# Patient Record
Sex: FEMALE | Race: WHITE | NOT HISPANIC OR LATINO | Employment: OTHER | URBAN - METROPOLITAN AREA
[De-identification: names, ages, dates, MRNs, and addresses within clinical notes are randomized per-mention and may not be internally consistent; named-entity substitution may affect disease eponyms.]

---

## 2023-03-24 DIAGNOSIS — M54.16 ACUTE LEFT LUMBAR RADICULOPATHY: Primary | ICD-10-CM

## 2023-03-24 PROBLEM — M85.80 OSTEOPENIA: Status: ACTIVE | Noted: 2023-03-24

## 2023-03-24 PROBLEM — R29.6 RECURRENT FALLS WHILE WALKING: Status: ACTIVE | Noted: 2023-03-24

## 2023-03-24 PROBLEM — M46.46 DISCITIS OF LUMBAR REGION: Status: ACTIVE | Noted: 2023-03-24

## 2023-03-24 PROBLEM — E66.812 OBESITY, CLASS II, BMI 35-39.9: Status: ACTIVE | Noted: 2023-03-24

## 2023-03-24 PROBLEM — M19.042 PRIMARY OSTEOARTHRITIS OF BOTH HANDS: Status: ACTIVE | Noted: 2023-03-24

## 2023-03-24 PROBLEM — I73.9 ARTERIAL INSUFFICIENCY OF LOWER EXTREMITY (CMS-HCC): Status: ACTIVE | Noted: 2023-03-24

## 2023-03-24 PROBLEM — K59.1 FUNCTIONAL DIARRHEA: Status: ACTIVE | Noted: 2023-03-24

## 2023-03-24 PROBLEM — M47.817 SPONDYLOSIS OF LUMBOSACRAL REGION WITHOUT MYELOPATHY OR RADICULOPATHY: Status: ACTIVE | Noted: 2023-03-24

## 2023-03-24 PROBLEM — K80.20 CHOLELITHIASIS: Status: ACTIVE | Noted: 2023-03-24

## 2023-03-24 PROBLEM — R53.83 FATIGUE: Status: ACTIVE | Noted: 2023-03-24
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
## 2023-03-24 PROBLEM — N39.0 URINARY TRACT INFECTION: Status: ACTIVE | Noted: 2023-03-24

## 2023-03-24 PROBLEM — N28.89 RENAL MASS, RIGHT: Status: ACTIVE | Noted: 2023-03-24

## 2023-03-24 PROBLEM — H53.2 DIPLOPIA: Status: ACTIVE | Noted: 2023-03-24

## 2023-03-24 PROBLEM — R53.1 WEAKNESS: Status: ACTIVE | Noted: 2023-03-24

## 2023-03-24 PROBLEM — R26.9 GAIT DIFFICULTY: Status: ACTIVE | Noted: 2023-03-24

## 2023-03-24 PROBLEM — M19.041 PRIMARY OSTEOARTHRITIS OF BOTH HANDS: Status: ACTIVE | Noted: 2023-03-24

## 2023-03-24 PROBLEM — E78.00 ELEVATED LDL CHOLESTEROL LEVEL: Status: ACTIVE | Noted: 2023-03-24

## 2023-03-24 PROBLEM — I12.9 HYPERTENSION ASSOCIATED WITH STAGE 3 CHRONIC KIDNEY DISEASE DUE TO TYPE 2 DIABETES MELLITUS (MULTI): Status: ACTIVE | Noted: 2023-03-24

## 2023-03-24 PROBLEM — R74.8 HIGH LEVEL OF CARDIAC MARKER: Status: ACTIVE | Noted: 2023-03-24

## 2023-03-24 PROBLEM — D72.829 LEUKOCYTOSIS: Status: ACTIVE | Noted: 2023-03-24

## 2023-03-24 PROBLEM — R07.9 CHEST PAIN: Status: ACTIVE | Noted: 2023-03-24

## 2023-03-24 PROBLEM — M19.012 PRIMARY OSTEOARTHRITIS OF LEFT SHOULDER: Status: ACTIVE | Noted: 2023-03-24

## 2023-03-24 PROBLEM — M48.061 STENOSIS, SPINAL, LUMBAR: Status: ACTIVE | Noted: 2023-03-24

## 2023-03-24 PROBLEM — N18.30 HYPERTENSION ASSOCIATED WITH STAGE 3 CHRONIC KIDNEY DISEASE DUE TO TYPE 2 DIABETES MELLITUS (MULTI): Status: ACTIVE | Noted: 2023-03-24

## 2023-03-24 PROBLEM — R26.89 IMPAIRMENT OF BALANCE: Status: ACTIVE | Noted: 2023-03-24

## 2023-03-24 PROBLEM — E66.9 OBESITY, CLASS II, BMI 35-39.9: Status: ACTIVE | Noted: 2023-03-24

## 2023-03-24 PROBLEM — E11.3213: Status: ACTIVE | Noted: 2023-03-24

## 2023-03-24 PROBLEM — M75.02 ADHESIVE CAPSULITIS OF LEFT SHOULDER: Status: ACTIVE | Noted: 2023-03-24
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
## 2023-03-24 PROBLEM — M25.741: Status: ACTIVE | Noted: 2023-03-24

## 2023-03-24 PROBLEM — E11.22 HYPERTENSION ASSOCIATED WITH STAGE 3 CHRONIC KIDNEY DISEASE DUE TO TYPE 2 DIABETES MELLITUS (MULTI): Status: ACTIVE | Noted: 2023-03-24

## 2023-03-26 RX ORDER — DULOXETIN HYDROCHLORIDE 60 MG/1
CAPSULE, DELAYED RELEASE ORAL
Qty: 90 CAPSULE | Refills: 1 | Status: SHIPPED | OUTPATIENT
Start: 2023-03-26 | End: 2023-10-25

## 2023-04-22 DIAGNOSIS — I10 PRIMARY HYPERTENSION: Primary | ICD-10-CM

## 2023-04-23 NOTE — TELEPHONE ENCOUNTER
Requested Prescriptions     Pending Prescriptions Disp Refills    amLODIPine (Norvasc) 5 mg tablet [Pharmacy Med Name: amLODIPine Besylate Oral Tablet 5 MG] 90 tablet 1     Sig: TAKE ONE TABLET BY MOUTH DAILY

## 2023-04-24 RX ORDER — AMLODIPINE BESYLATE 5 MG/1
TABLET ORAL
Qty: 90 TABLET | Refills: 1 | Status: SHIPPED | OUTPATIENT
Start: 2023-04-24 | End: 2023-10-30

## 2023-05-11 DIAGNOSIS — E78.00 ELEVATED LDL CHOLESTEROL LEVEL: ICD-10-CM

## 2023-05-11 DIAGNOSIS — E11.3213 TYPE 2 DIABETES MELLITUS WITH MILD NONPROLIFERATIVE RETINOPATHY OF BOTH EYES AND MACULAR EDEMA, UNSPECIFIED WHETHER LONG TERM INSULIN USE (MULTI): ICD-10-CM

## 2023-05-11 NOTE — PROGRESS NOTES
Orders Placed This Encounter   Procedures    CBC     Standing Status:   Future     Standing Expiration Date:   5/11/2024     Order Specific Question:   Release result to MyChart     Answer:   Immediate    Comprehensive Metabolic Panel     Standing Status:   Future     Standing Expiration Date:   5/11/2024     Order Specific Question:   Release result to MyChart     Answer:   Immediate    Hemoglobin A1C     Standing Status:   Future     Standing Expiration Date:   5/11/2024     Order Specific Question:   Release result to MyChart     Answer:   Immediate    Lipid Panel     Standing Status:   Future     Standing Expiration Date:   5/11/2024     Order Specific Question:   Release result to MyChart     Answer:   Immediate    Albumin, urine, random     Standing Status:   Future     Standing Expiration Date:   5/11/2024     Order Specific Question:   Release result to MyChart     Answer:   Immediate

## 2023-06-05 DIAGNOSIS — E11.3213 TYPE 2 DIABETES MELLITUS WITH MILD NONPROLIFERATIVE RETINOPATHY OF BOTH EYES AND MACULAR EDEMA, UNSPECIFIED WHETHER LONG TERM INSULIN USE (MULTI): ICD-10-CM

## 2023-06-05 NOTE — TELEPHONE ENCOUNTER
Requested Prescriptions     Pending Prescriptions Disp Refills    Lantus U-100 Insulin 100 unit/mL injection 10 mL 1     Sig: Inject 35 units in the morning, 20 units in the evening

## 2023-06-07 RX ORDER — INSULIN GLARGINE 100 [IU]/ML
INJECTION, SOLUTION SUBCUTANEOUS
Qty: 10 ML | Refills: 1 | Status: SHIPPED | OUTPATIENT
Start: 2023-06-07 | End: 2023-06-26

## 2023-06-16 DIAGNOSIS — E11.3213 TYPE 2 DIABETES MELLITUS WITH MILD NONPROLIFERATIVE RETINOPATHY OF BOTH EYES AND MACULAR EDEMA, UNSPECIFIED WHETHER LONG TERM INSULIN USE (MULTI): ICD-10-CM

## 2023-06-17 RX ORDER — LANCETS
EACH MISCELLANEOUS
Qty: 100 EACH | Refills: 3 | Status: SHIPPED | OUTPATIENT
Start: 2023-06-17 | End: 2023-07-06 | Stop reason: SDUPTHER

## 2023-06-26 DIAGNOSIS — E11.3213 TYPE 2 DIABETES MELLITUS WITH MILD NONPROLIFERATIVE RETINOPATHY OF BOTH EYES AND MACULAR EDEMA, UNSPECIFIED WHETHER LONG TERM INSULIN USE (MULTI): ICD-10-CM

## 2023-06-26 RX ORDER — INSULIN GLARGINE 100 [IU]/ML
INJECTION, SOLUTION SUBCUTANEOUS
Qty: 50 ML | Refills: 1 | Status: SHIPPED | OUTPATIENT
Start: 2023-06-26 | End: 2023-12-27

## 2023-06-26 RX ORDER — PEN NEEDLE, DIABETIC 31 GX5/16"
NEEDLE, DISPOSABLE MISCELLANEOUS
Qty: 90 EACH | Refills: 3 | Status: SHIPPED | OUTPATIENT
Start: 2023-06-26

## 2023-06-26 RX ORDER — PEN NEEDLE, DIABETIC 29 G X1/2"
NEEDLE, DISPOSABLE MISCELLANEOUS
Qty: 90 EACH | Refills: 3 | Status: SHIPPED | OUTPATIENT
Start: 2023-06-26

## 2023-07-03 ENCOUNTER — LAB (OUTPATIENT)
Dept: LAB | Facility: LAB | Age: 83
End: 2023-07-03
Payer: MEDICARE

## 2023-07-03 DIAGNOSIS — E78.00 ELEVATED LDL CHOLESTEROL LEVEL: ICD-10-CM

## 2023-07-03 DIAGNOSIS — E11.3213 TYPE 2 DIABETES MELLITUS WITH MILD NONPROLIFERATIVE RETINOPATHY OF BOTH EYES AND MACULAR EDEMA, UNSPECIFIED WHETHER LONG TERM INSULIN USE (MULTI): ICD-10-CM

## 2023-07-03 LAB
ALANINE AMINOTRANSFERASE (SGPT) (U/L) IN SER/PLAS: 12 U/L (ref 7–45)
ALBUMIN (G/DL) IN SER/PLAS: 3.5 G/DL (ref 3.4–5)
ALKALINE PHOSPHATASE (U/L) IN SER/PLAS: 89 U/L (ref 33–136)
ANION GAP IN SER/PLAS: 14 MMOL/L (ref 10–20)
ASPARTATE AMINOTRANSFERASE (SGOT) (U/L) IN SER/PLAS: 17 U/L (ref 9–39)
BILIRUBIN TOTAL (MG/DL) IN SER/PLAS: 0.6 MG/DL (ref 0–1.2)
CALCIUM (MG/DL) IN SER/PLAS: 9 MG/DL (ref 8.6–10.3)
CARBON DIOXIDE, TOTAL (MMOL/L) IN SER/PLAS: 26 MMOL/L (ref 21–32)
CHLORIDE (MMOL/L) IN SER/PLAS: 106 MMOL/L (ref 98–107)
CHOLESTEROL (MG/DL) IN SER/PLAS: 118 MG/DL (ref 0–199)
CHOLESTEROL IN HDL (MG/DL) IN SER/PLAS: 45.7 MG/DL
CHOLESTEROL/HDL RATIO: 2.6
CREATININE (MG/DL) IN SER/PLAS: 1.11 MG/DL (ref 0.5–1.05)
ERYTHROCYTE DISTRIBUTION WIDTH (RATIO) BY AUTOMATED COUNT: 14.2 % (ref 11.5–14.5)
ERYTHROCYTE MEAN CORPUSCULAR HEMOGLOBIN CONCENTRATION (G/DL) BY AUTOMATED: 30.1 G/DL (ref 32–36)
ERYTHROCYTE MEAN CORPUSCULAR VOLUME (FL) BY AUTOMATED COUNT: 91 FL (ref 80–100)
ERYTHROCYTES (10*6/UL) IN BLOOD BY AUTOMATED COUNT: 4.59 X10E12/L (ref 4–5.2)
GFR FEMALE: 49 ML/MIN/1.73M2
GLUCOSE (MG/DL) IN SER/PLAS: 176 MG/DL (ref 74–99)
HEMATOCRIT (%) IN BLOOD BY AUTOMATED COUNT: 41.8 % (ref 36–46)
HEMOGLOBIN (G/DL) IN BLOOD: 12.6 G/DL (ref 12–16)
LDL: 47 MG/DL (ref 0–99)
LEUKOCYTES (10*3/UL) IN BLOOD BY AUTOMATED COUNT: 10.9 X10E9/L (ref 4.4–11.3)
PLATELETS (10*3/UL) IN BLOOD AUTOMATED COUNT: 253 X10E9/L (ref 150–450)
POTASSIUM (MMOL/L) IN SER/PLAS: 3.8 MMOL/L (ref 3.5–5.3)
PROTEIN TOTAL: 6.2 G/DL (ref 6.4–8.2)
SODIUM (MMOL/L) IN SER/PLAS: 142 MMOL/L (ref 136–145)
TRIGLYCERIDE (MG/DL) IN SER/PLAS: 129 MG/DL (ref 0–149)
UREA NITROGEN (MG/DL) IN SER/PLAS: 26 MG/DL (ref 6–23)
VLDL: 26 MG/DL (ref 0–40)

## 2023-07-03 PROCEDURE — 36415 COLL VENOUS BLD VENIPUNCTURE: CPT

## 2023-07-03 PROCEDURE — 83036 HEMOGLOBIN GLYCOSYLATED A1C: CPT

## 2023-07-03 PROCEDURE — 85027 COMPLETE CBC AUTOMATED: CPT

## 2023-07-03 PROCEDURE — 80053 COMPREHEN METABOLIC PANEL: CPT

## 2023-07-03 PROCEDURE — 80061 LIPID PANEL: CPT

## 2023-07-04 LAB
ESTIMATED AVERAGE GLUCOSE FOR HBA1C: 180 MG/DL
HEMOGLOBIN A1C/HEMOGLOBIN TOTAL IN BLOOD: 7.9 %

## 2023-07-06 ENCOUNTER — OFFICE VISIT (OUTPATIENT)
Dept: PRIMARY CARE | Facility: CLINIC | Age: 83
End: 2023-07-06
Payer: MEDICARE

## 2023-07-06 VITALS
WEIGHT: 220 LBS | HEART RATE: 107 BPM | BODY MASS INDEX: 38.97 KG/M2 | OXYGEN SATURATION: 92 % | SYSTOLIC BLOOD PRESSURE: 101 MMHG | DIASTOLIC BLOOD PRESSURE: 63 MMHG

## 2023-07-06 DIAGNOSIS — R30.0 DYSURIA: ICD-10-CM

## 2023-07-06 DIAGNOSIS — I12.9 HYPERTENSION ASSOCIATED WITH STAGE 3 CHRONIC KIDNEY DISEASE DUE TO TYPE 2 DIABETES MELLITUS (MULTI): ICD-10-CM

## 2023-07-06 DIAGNOSIS — E27.8 ADRENAL MASS, RIGHT (MULTI): ICD-10-CM

## 2023-07-06 DIAGNOSIS — N18.30 HYPERTENSION ASSOCIATED WITH STAGE 3 CHRONIC KIDNEY DISEASE DUE TO TYPE 2 DIABETES MELLITUS (MULTI): ICD-10-CM

## 2023-07-06 DIAGNOSIS — E11.3213 TYPE 2 DIABETES MELLITUS WITH BOTH EYES AFFECTED BY MILD NONPROLIFERATIVE RETINOPATHY AND MACULAR EDEMA, WITH LONG-TERM CURRENT USE OF INSULIN (MULTI): Primary | ICD-10-CM

## 2023-07-06 DIAGNOSIS — E11.3213 TYPE 2 DIABETES MELLITUS WITH MILD NONPROLIFERATIVE RETINOPATHY OF BOTH EYES AND MACULAR EDEMA, UNSPECIFIED WHETHER LONG TERM INSULIN USE (MULTI): ICD-10-CM

## 2023-07-06 DIAGNOSIS — E11.22 HYPERTENSION ASSOCIATED WITH STAGE 3 CHRONIC KIDNEY DISEASE DUE TO TYPE 2 DIABETES MELLITUS (MULTI): ICD-10-CM

## 2023-07-06 DIAGNOSIS — N28.89 BILATERAL RENAL MASSES: ICD-10-CM

## 2023-07-06 DIAGNOSIS — E78.00 ELEVATED LDL CHOLESTEROL LEVEL: ICD-10-CM

## 2023-07-06 DIAGNOSIS — Z79.4 TYPE 2 DIABETES MELLITUS WITH BOTH EYES AFFECTED BY MILD NONPROLIFERATIVE RETINOPATHY AND MACULAR EDEMA, WITH LONG-TERM CURRENT USE OF INSULIN (MULTI): Primary | ICD-10-CM

## 2023-07-06 PROCEDURE — 99214 OFFICE O/P EST MOD 30 MIN: CPT | Performed by: INTERNAL MEDICINE

## 2023-07-06 PROCEDURE — 3078F DIAST BP <80 MM HG: CPT | Performed by: INTERNAL MEDICINE

## 2023-07-06 PROCEDURE — 3074F SYST BP LT 130 MM HG: CPT | Performed by: INTERNAL MEDICINE

## 2023-07-06 PROCEDURE — 1160F RVW MEDS BY RX/DR IN RCRD: CPT | Performed by: INTERNAL MEDICINE

## 2023-07-06 PROCEDURE — 1125F AMNT PAIN NOTED PAIN PRSNT: CPT | Performed by: INTERNAL MEDICINE

## 2023-07-06 PROCEDURE — 1157F ADVNC CARE PLAN IN RCRD: CPT | Performed by: INTERNAL MEDICINE

## 2023-07-06 PROCEDURE — 1036F TOBACCO NON-USER: CPT | Performed by: INTERNAL MEDICINE

## 2023-07-06 PROCEDURE — 1159F MED LIST DOCD IN RCRD: CPT | Performed by: INTERNAL MEDICINE

## 2023-07-06 RX ORDER — LANCETS
EACH MISCELLANEOUS
Qty: 300 EACH | Refills: 3 | Status: SHIPPED | OUTPATIENT
Start: 2023-07-06

## 2023-07-06 RX ORDER — INSULIN PUMP SYRINGE, 3 ML
1 EACH MISCELLANEOUS 3 TIMES DAILY
Qty: 1 EACH | Refills: 0 | Status: SHIPPED | OUTPATIENT
Start: 2023-07-06 | End: 2024-07-05

## 2023-07-06 NOTE — PROGRESS NOTES
Subjective   Patient ID: Mariaelena Ruiz is a 82 y.o. female who presents for Follow-up.    HPI     Admitted to [facility] [place] 3/21-3/23 for chest pain. Cardiac workup was neg. Found to cholelithiasis no s/p lap anjelica while inpatient. Chest pain resolved after lap anjelica. No SOB     Reports dysuria x several weeks. +urinary urgency and frequency. No hematuria     Need to review labs that were recently completed     Review of Systems    Objective   /69   Pulse 107   Wt 99.8 kg (220 lb)   SpO2 92%   BMI 38.97 kg/m²     Physical Exam  Constitutional:       Comments: Frail, uses walker to ambulate    HENT:      Head: Normocephalic and atraumatic.   Cardiovascular:      Rate and Rhythm: Normal rate and regular rhythm.      Heart sounds: Normal heart sounds. No murmur heard.     No gallop.   Pulmonary:      Effort: Pulmonary effort is normal. No respiratory distress.      Breath sounds: No wheezing or rales.   Skin:     General: Skin is warm and dry.      Findings: No rash.   Neurological:      Mental Status: She is alert and oriented to person, place, and time. Mental status is at baseline.   Psychiatric:         Mood and Affect: Mood normal.         Behavior: Behavior normal.         Assessment/Plan        #DM2  -A1c 7.9%,   -Continue Humalog 15 AC breakfast and 10 AC dinner . Cont Lantus 35 units in am and 20 units in PM --recommended snack at bedtime for low am sugar   -Stop Jardiance due to urinary symptoms   -Eye dr: 3/2021- reminded to go      #HTN  -Well controlled. Cont amlodipine 5mg daily and losartan to 50mg daily      #Osteopenia  -previous right hip rx. Recommended Prolia- patient declines      #HLD  -Cont atorvastatin 20mg daily, LDL at goal <70     #CKD3a  -Stable, cont ARB/statin , had several previous UTIs so will defer SGLT2i    #Dysuria  -unable to urinate today . Will drop off UA at lab    #right adrenal mass  -Refer to endo    #b/l Renal mass  -Order MRI kidneys      #Lumbar radiculopathy   -Cont  gabapentin and duloxetine      Influenza: 10/12/22  COVID: x2  Prevnar 13: UTD   Pneumovax 23: UTD   Shingrix: Never  Mamm: 10/25/17- NI  DEXA: 3/11/20- osteopenia   Pap: NI   Colorectal ca: 9/27/16- 10 years      RTC 6 mo

## 2023-07-06 NOTE — PATIENT INSTRUCTIONS
Mri kidneys   [phone]    Endocrinology for adrenal mass: Dr. Thomason: [phone]    Drop off urine at labs, orders are in     No med changes    Come back in 6 months

## 2023-07-14 ENCOUNTER — LAB (OUTPATIENT)
Dept: LAB | Facility: LAB | Age: 83
End: 2023-07-14
Payer: MEDICARE

## 2023-07-14 DIAGNOSIS — R30.0 DYSURIA: ICD-10-CM

## 2023-07-14 LAB
APPEARANCE, URINE: ABNORMAL
BACTERIA, URINE: ABNORMAL /HPF
BILIRUBIN, URINE: NEGATIVE
BLOOD, URINE: ABNORMAL
COLOR, URINE: YELLOW
GLUCOSE, URINE: NEGATIVE MG/DL
KETONES, URINE: NEGATIVE MG/DL
LEUKOCYTE ESTERASE, URINE: ABNORMAL
NITRITE, URINE: POSITIVE
PH, URINE: 5 (ref 5–8)
PROTEIN, URINE: NEGATIVE MG/DL
RBC, URINE: 6 /HPF (ref 0–5)
SPECIFIC GRAVITY, URINE: 1.01 (ref 1–1.03)
SQUAMOUS EPITHELIAL CELLS, URINE: 1 /HPF
UROBILINOGEN, URINE: <2 MG/DL (ref 0–1.9)
WBC CLUMPS, URINE: ABNORMAL /HPF
WBC, URINE: 66 /HPF (ref 0–5)

## 2023-07-14 PROCEDURE — 82570 ASSAY OF URINE CREATININE: CPT

## 2023-07-14 PROCEDURE — 82043 UR ALBUMIN QUANTITATIVE: CPT

## 2023-07-14 PROCEDURE — 81001 URINALYSIS AUTO W/SCOPE: CPT

## 2023-07-15 LAB
ALBUMIN (MG/L) IN URINE: 16.8 MG/L
ALBUMIN/CREATININE (UG/MG) IN URINE: 28.8 UG/MG CRT (ref 0–30)
CREATININE (MG/DL) IN URINE: 58.4 MG/DL (ref 20–320)

## 2023-07-16 DIAGNOSIS — R30.0 DYSURIA: Primary | ICD-10-CM

## 2023-07-16 RX ORDER — CIPROFLOXACIN 500 MG/1
500 TABLET ORAL 2 TIMES DAILY
Qty: 10 TABLET | Refills: 0 | Status: SHIPPED | OUTPATIENT
Start: 2023-07-16 | End: 2023-07-21

## 2023-07-27 DIAGNOSIS — N28.89 RIGHT KIDNEY MASS: Primary | ICD-10-CM

## 2023-08-21 ENCOUNTER — TELEPHONE (OUTPATIENT)
Dept: PRIMARY CARE | Facility: CLINIC | Age: 83
End: 2023-08-21
Payer: MEDICARE

## 2023-08-24 DIAGNOSIS — Z79.4 TYPE 2 DIABETES MELLITUS WITH BOTH EYES AFFECTED BY MILD NONPROLIFERATIVE RETINOPATHY AND MACULAR EDEMA, WITH LONG-TERM CURRENT USE OF INSULIN (MULTI): Primary | ICD-10-CM

## 2023-08-24 DIAGNOSIS — E11.3213 TYPE 2 DIABETES MELLITUS WITH BOTH EYES AFFECTED BY MILD NONPROLIFERATIVE RETINOPATHY AND MACULAR EDEMA, WITH LONG-TERM CURRENT USE OF INSULIN (MULTI): Primary | ICD-10-CM

## 2023-08-24 NOTE — TELEPHONE ENCOUNTER
Requested Prescriptions     Pending Prescriptions Disp Refills    HumaLOG KwikPen Insulin 100 unit/mL injection [Pharmacy Med Name: HUMALOG KWIK PEN 100U/ML] 30 mL 2     Sig: INJECT SUBCUTANEOUSLY 15   UNITS IN THE MORNING AND 10UNITS WITH DINNER

## 2023-08-25 RX ORDER — INSULIN LISPRO 100 [IU]/ML
INJECTION, SOLUTION INTRAVENOUS; SUBCUTANEOUS
Qty: 30 ML | Refills: 2 | Status: SHIPPED | OUTPATIENT
Start: 2023-08-25 | End: 2024-04-09

## 2023-10-05 DIAGNOSIS — N39.0 URINARY TRACT INFECTION WITHOUT HEMATURIA, SITE UNSPECIFIED: ICD-10-CM

## 2023-10-05 DIAGNOSIS — R30.0 DYSURIA: ICD-10-CM

## 2023-10-06 ENCOUNTER — LAB (OUTPATIENT)
Dept: LAB | Facility: LAB | Age: 83
End: 2023-10-06
Payer: MEDICARE

## 2023-10-06 DIAGNOSIS — R30.0 DYSURIA: ICD-10-CM

## 2023-10-06 PROCEDURE — 81001 URINALYSIS AUTO W/SCOPE: CPT

## 2023-10-06 PROCEDURE — 87186 SC STD MICRODIL/AGAR DIL: CPT

## 2023-10-06 PROCEDURE — 87086 URINE CULTURE/COLONY COUNT: CPT

## 2023-10-07 LAB
APPEARANCE UR: ABNORMAL
BACTERIA #/AREA URNS AUTO: ABNORMAL /HPF
BILIRUB UR STRIP.AUTO-MCNC: NEGATIVE MG/DL
CAOX CRY #/AREA UR COMP ASSIST: ABNORMAL /HPF
COLOR UR: ABNORMAL
GLUCOSE UR STRIP.AUTO-MCNC: ABNORMAL MG/DL
KETONES UR STRIP.AUTO-MCNC: NEGATIVE MG/DL
LEUKOCYTE ESTERASE UR QL STRIP.AUTO: ABNORMAL
NITRITE UR QL STRIP.AUTO: POSITIVE
PH UR STRIP.AUTO: 5 [PH]
PROT UR STRIP.AUTO-MCNC: ABNORMAL MG/DL
RBC # UR STRIP.AUTO: NEGATIVE /UL
RBC #/AREA URNS AUTO: ABNORMAL /HPF
SP GR UR STRIP.AUTO: 1.02
SQUAMOUS #/AREA URNS AUTO: ABNORMAL /HPF
UROBILINOGEN UR STRIP.AUTO-MCNC: <2 MG/DL
WBC #/AREA URNS AUTO: >50 /HPF
WBC CLUMPS #/AREA URNS AUTO: ABNORMAL /HPF

## 2023-10-09 DIAGNOSIS — R30.0 DYSURIA: Primary | ICD-10-CM

## 2023-10-09 LAB — BACTERIA UR CULT: ABNORMAL

## 2023-10-09 RX ORDER — CIPROFLOXACIN 500 MG/1
500 TABLET ORAL 2 TIMES DAILY
Qty: 10 TABLET | Refills: 0 | Status: SHIPPED | OUTPATIENT
Start: 2023-10-09 | End: 2023-10-14

## 2023-10-25 DIAGNOSIS — E78.00 ELEVATED LDL CHOLESTEROL LEVEL: ICD-10-CM

## 2023-10-25 DIAGNOSIS — M54.16 ACUTE LEFT LUMBAR RADICULOPATHY: ICD-10-CM

## 2023-10-25 RX ORDER — ATORVASTATIN CALCIUM 20 MG/1
TABLET, FILM COATED ORAL
Qty: 90 TABLET | Refills: 1 | Status: SHIPPED | OUTPATIENT
Start: 2023-10-25 | End: 2024-05-31

## 2023-10-25 RX ORDER — DULOXETIN HYDROCHLORIDE 60 MG/1
CAPSULE, DELAYED RELEASE ORAL
Qty: 90 CAPSULE | Refills: 1 | Status: SHIPPED | OUTPATIENT
Start: 2023-10-25 | End: 2023-12-07 | Stop reason: HOSPADM

## 2023-10-25 NOTE — TELEPHONE ENCOUNTER
Requested Prescriptions     Pending Prescriptions Disp Refills    atorvastatin (Lipitor) 20 mg tablet [Pharmacy Med Name: Atorvastatin Calcium Oral Tablet 20 MG] 90 tablet 1     Sig: TAKE ONE TABLET BY MOUTH EVERY DAY    DULoxetine (Cymbalta) 60 mg DR capsule [Pharmacy Med Name: DULoxetine HCl Oral Capsule Delayed Release Particles 60 MG] 90 capsule 1     Sig: TAKE ONE CAPSULE BY MOUTH DAILY

## 2023-10-27 DIAGNOSIS — I10 PRIMARY HYPERTENSION: ICD-10-CM

## 2023-10-27 DIAGNOSIS — R19.7 DIARRHEA, UNSPECIFIED TYPE: Primary | ICD-10-CM

## 2023-10-27 RX ORDER — MONTELUKAST SODIUM 4 MG/1
1 TABLET, CHEWABLE ORAL DAILY
COMMUNITY
Start: 2023-07-27 | End: 2023-10-30 | Stop reason: ALTCHOICE

## 2023-10-27 NOTE — TELEPHONE ENCOUNTER
Requested Prescriptions     Pending Prescriptions Disp Refills    colestipol (Colestid) 1 gram tablet [Pharmacy Med Name: Colestipol HCl Oral Tablet 1 GM] 90 tablet 1     Sig: TAKE ONE TABLET BY MOUTH DAILY    amLODIPine (Norvasc) 5 mg tablet [Pharmacy Med Name: amLODIPine Besylate Oral Tablet 5 MG] 90 tablet 1     Sig: TAKE ONE TABLET BY MOUTH DAILY

## 2023-10-30 RX ORDER — AMLODIPINE BESYLATE 5 MG/1
TABLET ORAL
Qty: 90 TABLET | Refills: 1 | Status: SHIPPED | OUTPATIENT
Start: 2023-10-30 | End: 2024-05-31

## 2023-10-30 RX ORDER — MONTELUKAST SODIUM 4 MG/1
1 TABLET, CHEWABLE ORAL DAILY
Qty: 90 TABLET | Refills: 1 | Status: SHIPPED | OUTPATIENT
Start: 2023-10-30 | End: 2023-12-07 | Stop reason: HOSPADM

## 2023-12-04 ENCOUNTER — HOSPITAL ENCOUNTER (OUTPATIENT)
Facility: HOSPITAL | Age: 83
Setting detail: OBSERVATION
Discharge: HOME | End: 2023-12-07
Attending: INTERNAL MEDICINE | Admitting: INTERNAL MEDICINE
Payer: MEDICARE

## 2023-12-04 ENCOUNTER — APPOINTMENT (OUTPATIENT)
Dept: RADIOLOGY | Facility: HOSPITAL | Age: 83
End: 2023-12-04
Payer: MEDICARE

## 2023-12-04 ENCOUNTER — DOCUMENTATION (OUTPATIENT)
Dept: PRIMARY CARE | Facility: CLINIC | Age: 83
End: 2023-12-04
Payer: MEDICARE

## 2023-12-04 DIAGNOSIS — M46.46 DISCITIS OF LUMBAR REGION: ICD-10-CM

## 2023-12-04 DIAGNOSIS — M79.602 PAIN IN LEFT ARM: ICD-10-CM

## 2023-12-04 DIAGNOSIS — M54.32 SCIATICA OF LEFT SIDE: Primary | ICD-10-CM

## 2023-12-04 DIAGNOSIS — N18.30 HYPERTENSION ASSOCIATED WITH STAGE 3 CHRONIC KIDNEY DISEASE DUE TO TYPE 2 DIABETES MELLITUS (MULTI): ICD-10-CM

## 2023-12-04 DIAGNOSIS — E11.22 HYPERTENSION ASSOCIATED WITH STAGE 3 CHRONIC KIDNEY DISEASE DUE TO TYPE 2 DIABETES MELLITUS (MULTI): ICD-10-CM

## 2023-12-04 DIAGNOSIS — M79.89 LEFT UPPER EXTREMITY SWELLING: ICD-10-CM

## 2023-12-04 DIAGNOSIS — G89.29 CHRONIC LOW BACK PAIN, UNSPECIFIED BACK PAIN LATERALITY, UNSPECIFIED WHETHER SCIATICA PRESENT: ICD-10-CM

## 2023-12-04 DIAGNOSIS — M54.50 CHRONIC LOW BACK PAIN, UNSPECIFIED BACK PAIN LATERALITY, UNSPECIFIED WHETHER SCIATICA PRESENT: ICD-10-CM

## 2023-12-04 DIAGNOSIS — K59.00 CONSTIPATION, UNSPECIFIED CONSTIPATION TYPE: ICD-10-CM

## 2023-12-04 DIAGNOSIS — M54.12 CERVICAL RADICULOPATHY: ICD-10-CM

## 2023-12-04 DIAGNOSIS — M54.42 ACUTE LEFT-SIDED LOW BACK PAIN WITH LEFT-SIDED SCIATICA: ICD-10-CM

## 2023-12-04 DIAGNOSIS — I12.9 HYPERTENSION ASSOCIATED WITH STAGE 3 CHRONIC KIDNEY DISEASE DUE TO TYPE 2 DIABETES MELLITUS (MULTI): ICD-10-CM

## 2023-12-04 DIAGNOSIS — Z09 HOSPITAL DISCHARGE FOLLOW-UP: ICD-10-CM

## 2023-12-04 LAB
ALBUMIN SERPL BCP-MCNC: 3.8 G/DL (ref 3.4–5)
ALP SERPL-CCNC: 107 U/L (ref 33–136)
ALT SERPL W P-5'-P-CCNC: 16 U/L (ref 7–45)
ANION GAP SERPL CALC-SCNC: 21 MMOL/L (ref 10–20)
AST SERPL W P-5'-P-CCNC: 41 U/L (ref 9–39)
BASOPHILS # BLD AUTO: 0.09 X10*3/UL (ref 0–0.1)
BASOPHILS NFR BLD AUTO: 0.6 %
BILIRUB SERPL-MCNC: 0.7 MG/DL (ref 0–1.2)
BUN SERPL-MCNC: 28 MG/DL (ref 6–23)
CALCIUM SERPL-MCNC: 9.2 MG/DL (ref 8.6–10.3)
CHLORIDE SERPL-SCNC: 103 MMOL/L (ref 98–107)
CO2 SERPL-SCNC: 26 MMOL/L (ref 21–32)
CREAT SERPL-MCNC: 1.08 MG/DL (ref 0.5–1.05)
EOSINOPHIL # BLD AUTO: 0.15 X10*3/UL (ref 0–0.4)
EOSINOPHIL NFR BLD AUTO: 1 %
ERYTHROCYTE [DISTWIDTH] IN BLOOD BY AUTOMATED COUNT: 14.3 % (ref 11.5–14.5)
GFR SERPL CREATININE-BSD FRML MDRD: 51 ML/MIN/1.73M*2
GLUCOSE BLD MANUAL STRIP-MCNC: 216 MG/DL (ref 74–99)
GLUCOSE SERPL-MCNC: 139 MG/DL (ref 74–99)
HCT VFR BLD AUTO: 43.3 % (ref 36–46)
HGB BLD-MCNC: 13.4 G/DL (ref 12–16)
IMM GRANULOCYTES # BLD AUTO: 0.05 X10*3/UL (ref 0–0.5)
IMM GRANULOCYTES NFR BLD AUTO: 0.3 % (ref 0–0.9)
LYMPHOCYTES # BLD AUTO: 1.84 X10*3/UL (ref 0.8–3)
LYMPHOCYTES NFR BLD AUTO: 12.9 %
MCH RBC QN AUTO: 27.3 PG (ref 26–34)
MCHC RBC AUTO-ENTMCNC: 30.9 G/DL (ref 32–36)
MCV RBC AUTO: 88 FL (ref 80–100)
MONOCYTES # BLD AUTO: 0.92 X10*3/UL (ref 0.05–0.8)
MONOCYTES NFR BLD AUTO: 6.4 %
NEUTROPHILS # BLD AUTO: 11.26 X10*3/UL (ref 1.6–5.5)
NEUTROPHILS NFR BLD AUTO: 78.8 %
NRBC BLD-RTO: 0 /100 WBCS (ref 0–0)
PLATELET # BLD AUTO: 272 X10*3/UL (ref 150–450)
POTASSIUM SERPL-SCNC: 4.5 MMOL/L (ref 3.5–5.3)
PROT SERPL-MCNC: 7.3 G/DL (ref 6.4–8.2)
RBC # BLD AUTO: 4.9 X10*6/UL (ref 4–5.2)
SODIUM SERPL-SCNC: 145 MMOL/L (ref 136–145)
WBC # BLD AUTO: 14.3 X10*3/UL (ref 4.4–11.3)

## 2023-12-04 PROCEDURE — 2500000002 HC RX 250 W HCPCS SELF ADMINISTERED DRUGS (ALT 637 FOR MEDICARE OP, ALT 636 FOR OP/ED): Mod: MUE

## 2023-12-04 PROCEDURE — 96372 THER/PROPH/DIAG INJ SC/IM: CPT

## 2023-12-04 PROCEDURE — 72131 CT LUMBAR SPINE W/O DYE: CPT | Performed by: RADIOLOGY

## 2023-12-04 PROCEDURE — 99285 EMERGENCY DEPT VISIT HI MDM: CPT

## 2023-12-04 PROCEDURE — 36415 COLL VENOUS BLD VENIPUNCTURE: CPT | Performed by: PHYSICIAN ASSISTANT

## 2023-12-04 PROCEDURE — G0378 HOSPITAL OBSERVATION PER HR: HCPCS

## 2023-12-04 PROCEDURE — 80053 COMPREHEN METABOLIC PANEL: CPT | Performed by: PHYSICIAN ASSISTANT

## 2023-12-04 PROCEDURE — 85025 COMPLETE CBC W/AUTO DIFF WBC: CPT | Performed by: PHYSICIAN ASSISTANT

## 2023-12-04 PROCEDURE — 99222 1ST HOSP IP/OBS MODERATE 55: CPT | Performed by: STUDENT IN AN ORGANIZED HEALTH CARE EDUCATION/TRAINING PROGRAM

## 2023-12-04 PROCEDURE — 84075 ASSAY ALKALINE PHOSPHATASE: CPT | Performed by: PHYSICIAN ASSISTANT

## 2023-12-04 PROCEDURE — 82947 ASSAY GLUCOSE BLOOD QUANT: CPT | Mod: 59

## 2023-12-04 PROCEDURE — 72125 CT NECK SPINE W/O DYE: CPT | Performed by: RADIOLOGY

## 2023-12-04 PROCEDURE — 2500000004 HC RX 250 GENERAL PHARMACY W/ HCPCS (ALT 636 FOR OP/ED)

## 2023-12-04 PROCEDURE — 2500000001 HC RX 250 WO HCPCS SELF ADMINISTERED DRUGS (ALT 637 FOR MEDICARE OP): Performed by: PHYSICIAN ASSISTANT

## 2023-12-04 PROCEDURE — 72125 CT NECK SPINE W/O DYE: CPT

## 2023-12-04 PROCEDURE — 2500000001 HC RX 250 WO HCPCS SELF ADMINISTERED DRUGS (ALT 637 FOR MEDICARE OP)

## 2023-12-04 PROCEDURE — 72131 CT LUMBAR SPINE W/O DYE: CPT

## 2023-12-04 RX ORDER — ENOXAPARIN SODIUM 100 MG/ML
40 INJECTION SUBCUTANEOUS EVERY 24 HOURS
Status: CANCELLED | OUTPATIENT
Start: 2023-12-04

## 2023-12-04 RX ORDER — HYDROCODONE BITARTRATE AND ACETAMINOPHEN 5; 325 MG/1; MG/1
1 TABLET ORAL ONCE
Status: COMPLETED | OUTPATIENT
Start: 2023-12-04 | End: 2023-12-04

## 2023-12-04 RX ORDER — AMLODIPINE BESYLATE 5 MG/1
5 TABLET ORAL DAILY
Status: DISCONTINUED | OUTPATIENT
Start: 2023-12-04 | End: 2023-12-07 | Stop reason: HOSPADM

## 2023-12-04 RX ORDER — HYDROCODONE BITARTRATE AND ACETAMINOPHEN 5; 325 MG/1; MG/1
1 TABLET ORAL EVERY 6 HOURS PRN
Qty: 12 TABLET | Refills: 0 | Status: SHIPPED | OUTPATIENT
Start: 2023-12-04 | End: 2023-12-04 | Stop reason: SINTOL

## 2023-12-04 RX ORDER — LOSARTAN POTASSIUM 50 MG/1
50 TABLET ORAL DAILY
Status: DISCONTINUED | OUTPATIENT
Start: 2023-12-04 | End: 2023-12-07 | Stop reason: HOSPADM

## 2023-12-04 RX ORDER — DORZOLAMIDE HCL 20 MG/ML
1 SOLUTION/ DROPS OPHTHALMIC 2 TIMES DAILY
Status: DISCONTINUED | OUTPATIENT
Start: 2023-12-04 | End: 2023-12-04

## 2023-12-04 RX ORDER — INSULIN LISPRO 100 [IU]/ML
10 INJECTION, SOLUTION INTRAVENOUS; SUBCUTANEOUS
Status: DISCONTINUED | OUTPATIENT
Start: 2023-12-04 | End: 2023-12-07 | Stop reason: HOSPADM

## 2023-12-04 RX ORDER — INSULIN LISPRO 100 [IU]/ML
0-10 INJECTION, SOLUTION INTRAVENOUS; SUBCUTANEOUS
Status: DISCONTINUED | OUTPATIENT
Start: 2023-12-04 | End: 2023-12-07 | Stop reason: HOSPADM

## 2023-12-04 RX ORDER — DULOXETIN HYDROCHLORIDE 60 MG/1
60 CAPSULE, DELAYED RELEASE ORAL DAILY
Status: DISCONTINUED | OUTPATIENT
Start: 2023-12-04 | End: 2023-12-07 | Stop reason: HOSPADM

## 2023-12-04 RX ORDER — ATORVASTATIN CALCIUM 20 MG/1
20 TABLET, FILM COATED ORAL DAILY
Status: DISCONTINUED | OUTPATIENT
Start: 2023-12-04 | End: 2023-12-07 | Stop reason: HOSPADM

## 2023-12-04 RX ORDER — DEXTROSE 50 % IN WATER (D50W) INTRAVENOUS SYRINGE
25
Status: DISCONTINUED | OUTPATIENT
Start: 2023-12-04 | End: 2023-12-07 | Stop reason: HOSPADM

## 2023-12-04 RX ORDER — INSULIN GLARGINE 100 [IU]/ML
35 INJECTION, SOLUTION SUBCUTANEOUS EVERY MORNING
Status: DISCONTINUED | OUTPATIENT
Start: 2023-12-05 | End: 2023-12-07 | Stop reason: HOSPADM

## 2023-12-04 RX ORDER — NAPROXEN SODIUM 220 MG/1
81 TABLET, FILM COATED ORAL DAILY
Status: DISCONTINUED | OUTPATIENT
Start: 2023-12-04 | End: 2023-12-07 | Stop reason: HOSPADM

## 2023-12-04 RX ORDER — GABAPENTIN 300 MG/1
300 CAPSULE ORAL 2 TIMES DAILY
Status: DISCONTINUED | OUTPATIENT
Start: 2023-12-04 | End: 2023-12-07 | Stop reason: HOSPADM

## 2023-12-04 RX ORDER — OXYCODONE HYDROCHLORIDE 5 MG/1
5 TABLET ORAL EVERY 6 HOURS PRN
Status: DISCONTINUED | OUTPATIENT
Start: 2023-12-04 | End: 2023-12-07 | Stop reason: HOSPADM

## 2023-12-04 RX ORDER — ACETAMINOPHEN 325 MG/1
650 TABLET ORAL EVERY 6 HOURS PRN
Status: DISCONTINUED | OUTPATIENT
Start: 2023-12-04 | End: 2023-12-07 | Stop reason: HOSPADM

## 2023-12-04 RX ORDER — INSULIN GLARGINE 100 [IU]/ML
20 INJECTION, SOLUTION SUBCUTANEOUS NIGHTLY
Status: DISCONTINUED | OUTPATIENT
Start: 2023-12-04 | End: 2023-12-07 | Stop reason: HOSPADM

## 2023-12-04 RX ORDER — DEXTROSE MONOHYDRATE 100 MG/ML
0.3 INJECTION, SOLUTION INTRAVENOUS ONCE AS NEEDED
Status: DISCONTINUED | OUTPATIENT
Start: 2023-12-04 | End: 2023-12-07 | Stop reason: HOSPADM

## 2023-12-04 RX ORDER — MONTELUKAST SODIUM 4 MG/1
1 TABLET, CHEWABLE ORAL DAILY
Status: DISCONTINUED | OUTPATIENT
Start: 2023-12-04 | End: 2023-12-07

## 2023-12-04 RX ORDER — HEPARIN SODIUM 5000 [USP'U]/ML
5000 INJECTION, SOLUTION INTRAVENOUS; SUBCUTANEOUS EVERY 8 HOURS
Status: DISCONTINUED | OUTPATIENT
Start: 2023-12-04 | End: 2023-12-07 | Stop reason: HOSPADM

## 2023-12-04 RX ORDER — CHOLECALCIFEROL (VITAMIN D3) 25 MCG
2000 TABLET ORAL DAILY
Status: DISCONTINUED | OUTPATIENT
Start: 2023-12-04 | End: 2023-12-07 | Stop reason: HOSPADM

## 2023-12-04 RX ORDER — ACETAMINOPHEN 325 MG/1
325 TABLET ORAL EVERY 4 HOURS PRN
Status: DISCONTINUED | OUTPATIENT
Start: 2023-12-04 | End: 2023-12-04

## 2023-12-04 RX ADMIN — HEPARIN SODIUM 5000 UNITS: 5000 INJECTION INTRAVENOUS; SUBCUTANEOUS at 20:34

## 2023-12-04 RX ADMIN — HYDROCODONE BITARTRATE AND ACETAMINOPHEN 1 TABLET: 5; 325 TABLET ORAL at 15:10

## 2023-12-04 RX ADMIN — GABAPENTIN 300 MG: 300 CAPSULE ORAL at 20:33

## 2023-12-04 RX ADMIN — DULOXETINE HYDROCHLORIDE 60 MG: 60 CAPSULE, DELAYED RELEASE ORAL at 20:33

## 2023-12-04 RX ADMIN — AMLODIPINE BESYLATE 5 MG: 5 TABLET ORAL at 20:33

## 2023-12-04 RX ADMIN — LOSARTAN POTASSIUM 50 MG: 50 TABLET, FILM COATED ORAL at 20:33

## 2023-12-04 RX ADMIN — ASPIRIN 81 MG CHEWABLE TABLET 81 MG: 81 TABLET CHEWABLE at 20:32

## 2023-12-04 RX ADMIN — INSULIN GLARGINE 20 UNITS: 100 INJECTION, SOLUTION SUBCUTANEOUS at 20:38

## 2023-12-04 RX ADMIN — ATORVASTATIN CALCIUM 20 MG: 20 TABLET, FILM COATED ORAL at 20:33

## 2023-12-04 RX ADMIN — OXYCODONE HYDROCHLORIDE 5 MG: 5 TABLET ORAL at 20:33

## 2023-12-04 RX ADMIN — ACETAMINOPHEN 650 MG: 325 TABLET ORAL at 20:33

## 2023-12-04 SDOH — SOCIAL STABILITY: SOCIAL INSECURITY: DOES ANYONE TRY TO KEEP YOU FROM HAVING/CONTACTING OTHER FRIENDS OR DOING THINGS OUTSIDE YOUR HOME?: NO

## 2023-12-04 SDOH — SOCIAL STABILITY: SOCIAL INSECURITY: ARE YOU OR HAVE YOU BEEN THREATENED OR ABUSED PHYSICALLY, EMOTIONALLY, OR SEXUALLY BY ANYONE?: NO

## 2023-12-04 SDOH — SOCIAL STABILITY: SOCIAL INSECURITY: DO YOU FEEL UNSAFE GOING BACK TO THE PLACE WHERE YOU ARE LIVING?: NO

## 2023-12-04 SDOH — SOCIAL STABILITY: SOCIAL INSECURITY: ABUSE: ADULT

## 2023-12-04 SDOH — SOCIAL STABILITY: SOCIAL INSECURITY: HAVE YOU HAD THOUGHTS OF HARMING ANYONE ELSE?: NO

## 2023-12-04 SDOH — SOCIAL STABILITY: SOCIAL INSECURITY: WERE YOU ABLE TO COMPLETE ALL THE BEHAVIORAL HEALTH SCREENINGS?: YES

## 2023-12-04 SDOH — SOCIAL STABILITY: SOCIAL INSECURITY: HAS ANYONE EVER THREATENED TO HURT YOUR FAMILY OR YOUR PETS?: NO

## 2023-12-04 SDOH — SOCIAL STABILITY: SOCIAL INSECURITY: ARE THERE ANY APPARENT SIGNS OF INJURIES/BEHAVIORS THAT COULD BE RELATED TO ABUSE/NEGLECT?: NO

## 2023-12-04 SDOH — SOCIAL STABILITY: SOCIAL INSECURITY: DO YOU FEEL ANYONE HAS EXPLOITED OR TAKEN ADVANTAGE OF YOU FINANCIALLY OR OF YOUR PERSONAL PROPERTY?: NO

## 2023-12-04 ASSESSMENT — COGNITIVE AND FUNCTIONAL STATUS - GENERAL
TURNING FROM BACK TO SIDE WHILE IN FLAT BAD: A LITTLE
TOILETING: A LITTLE
DRESSING REGULAR UPPER BODY CLOTHING: A LITTLE
DRESSING REGULAR LOWER BODY CLOTHING: A LITTLE
MOBILITY SCORE: 17
MOVING FROM LYING ON BACK TO SITTING ON SIDE OF FLAT BED WITH BEDRAILS: A LITTLE
TURNING FROM BACK TO SIDE WHILE IN FLAT BAD: A LITTLE
DRESSING REGULAR LOWER BODY CLOTHING: A LITTLE
STANDING UP FROM CHAIR USING ARMS: A LITTLE
MOBILITY SCORE: 17
TOILETING: A LITTLE
DAILY ACTIVITIY SCORE: 20
DAILY ACTIVITIY SCORE: 20
DRESSING REGULAR LOWER BODY CLOTHING: A LITTLE
MOVING FROM LYING ON BACK TO SITTING ON SIDE OF FLAT BED WITH BEDRAILS: A LITTLE
WALKING IN HOSPITAL ROOM: A LITTLE
PATIENT BASELINE BEDBOUND: NO
HELP NEEDED FOR BATHING: A LITTLE
MOVING FROM LYING ON BACK TO SITTING ON SIDE OF FLAT BED WITH BEDRAILS: A LITTLE
CLIMB 3 TO 5 STEPS WITH RAILING: A LOT
CLIMB 3 TO 5 STEPS WITH RAILING: A LOT
WALKING IN HOSPITAL ROOM: A LITTLE
MOVING TO AND FROM BED TO CHAIR: A LITTLE
WALKING IN HOSPITAL ROOM: A LITTLE
STANDING UP FROM CHAIR USING ARMS: A LITTLE
MOVING TO AND FROM BED TO CHAIR: A LITTLE
DRESSING REGULAR UPPER BODY CLOTHING: A LITTLE
CLIMB 3 TO 5 STEPS WITH RAILING: A LOT
TURNING FROM BACK TO SIDE WHILE IN FLAT BAD: A LITTLE
DRESSING REGULAR UPPER BODY CLOTHING: A LITTLE
HELP NEEDED FOR BATHING: A LITTLE
STANDING UP FROM CHAIR USING ARMS: A LITTLE
HELP NEEDED FOR BATHING: A LITTLE
DAILY ACTIVITIY SCORE: 20
TOILETING: A LITTLE
MOVING TO AND FROM BED TO CHAIR: A LITTLE
MOBILITY SCORE: 17

## 2023-12-04 ASSESSMENT — ACTIVITIES OF DAILY LIVING (ADL)
DRESSING YOURSELF: NEEDS ASSISTANCE
FEEDING YOURSELF: INDEPENDENT
WALKS IN HOME: NEEDS ASSISTANCE
GROOMING: NEEDS ASSISTANCE
TOILETING: NEEDS ASSISTANCE
ADEQUATE_TO_COMPLETE_ADL: YES
HEARING - RIGHT EAR: FUNCTIONAL
JUDGMENT_ADEQUATE_SAFELY_COMPLETE_DAILY_ACTIVITIES: YES
BATHING: NEEDS ASSISTANCE
ASSISTIVE_DEVICE: WALKER
PATIENT'S MEMORY ADEQUATE TO SAFELY COMPLETE DAILY ACTIVITIES?: YES
HEARING - LEFT EAR: FUNCTIONAL

## 2023-12-04 ASSESSMENT — PATIENT HEALTH QUESTIONNAIRE - PHQ9
SUM OF ALL RESPONSES TO PHQ9 QUESTIONS 1 & 2: 0
2. FEELING DOWN, DEPRESSED OR HOPELESS: NOT AT ALL
1. LITTLE INTEREST OR PLEASURE IN DOING THINGS: NOT AT ALL

## 2023-12-04 ASSESSMENT — LIFESTYLE VARIABLES
HAVE PEOPLE ANNOYED YOU BY CRITICIZING YOUR DRINKING: NO
REASON UNABLE TO ASSESS: NO
SKIP TO QUESTIONS 9-10: 1
EVER HAD A DRINK FIRST THING IN THE MORNING TO STEADY YOUR NERVES TO GET RID OF A HANGOVER: NO
HAVE YOU EVER FELT YOU SHOULD CUT DOWN ON YOUR DRINKING: NO
HOW MANY STANDARD DRINKS CONTAINING ALCOHOL DO YOU HAVE ON A TYPICAL DAY: PATIENT DOES NOT DRINK
AUDIT-C TOTAL SCORE: 0
EVER FELT BAD OR GUILTY ABOUT YOUR DRINKING: NO
AUDIT-C TOTAL SCORE: 0
HOW OFTEN DO YOU HAVE A DRINK CONTAINING ALCOHOL: NEVER
HOW OFTEN DO YOU HAVE 6 OR MORE DRINKS ON ONE OCCASION: NEVER

## 2023-12-04 ASSESSMENT — PAIN SCALES - GENERAL: PAINLEVEL_OUTOF10: 5 - MODERATE PAIN

## 2023-12-04 ASSESSMENT — COLUMBIA-SUICIDE SEVERITY RATING SCALE - C-SSRS
6. HAVE YOU EVER DONE ANYTHING, STARTED TO DO ANYTHING, OR PREPARED TO DO ANYTHING TO END YOUR LIFE?: NO
1. IN THE PAST MONTH, HAVE YOU WISHED YOU WERE DEAD OR WISHED YOU COULD GO TO SLEEP AND NOT WAKE UP?: NO
2. HAVE YOU ACTUALLY HAD ANY THOUGHTS OF KILLING YOURSELF?: NO

## 2023-12-04 ASSESSMENT — PAIN DESCRIPTION - DESCRIPTORS: DESCRIPTORS: ACHING;THROBBING

## 2023-12-04 ASSESSMENT — PAIN - FUNCTIONAL ASSESSMENT: PAIN_FUNCTIONAL_ASSESSMENT: 0-10

## 2023-12-04 NOTE — PROGRESS NOTES
"Pharmacy Medication History Review    Mariaelena Ruiz is a 83 y.o. female admitted for Sciatica of left side. Pharmacy reviewed the patient's ergns-sn-xysjlppse medications and allergies for accuracy.    The list below reflectives the updated PTA list. Please review each medication in order reconciliation for additional clarification and justification.  Medications Prior to Admission   Medication Sig Dispense Refill Last Dose    amLODIPine (Norvasc) 5 mg tablet TAKE ONE TABLET BY MOUTH DAILY (Patient taking differently: Take 1 tablet (5 mg) by mouth once daily in the morning.) 90 tablet 1 12/4/2023 at AM    atorvastatin (Lipitor) 20 mg tablet TAKE ONE TABLET BY MOUTH EVERY DAY (Patient taking differently: Take 1 tablet (20 mg) by mouth once daily in the morning.) 90 tablet 1 12/3/2023 at AM    BD Insulin Syringe Ultra-Fine 0.5 mL 30 gauge x 1/2\" syringe USE TO INJECT INSULIN      SUBCUTANEOUSLY TWO TIMES A DAY 90 each 3     BD Ultra-Fine Short Pen Needle 31 gauge x 5/16\" needle USE TO INJECT INSULIN      SUBCUTANEOUSLY TWO TIMES A DAY 90 each 3     coenzyme Q-10 100 mg capsule Take by mouth every other day.   12/3/2023 at AM    colestipol (Colestid) 1 gram tablet TAKE ONE TABLET BY MOUTH DAILY (Patient not taking: Reported on 12/4/2023) 90 tablet 1 Not Taking    DULoxetine (Cymbalta) 60 mg DR capsule TAKE ONE CAPSULE BY MOUTH DAILY (Patient not taking: Reported on 12/4/2023) 90 capsule 1 Not Taking    FreeStyle glucose monitoring (OneTouch Ultra2 Meter) kit 1 each 3 times a day. 1 each 0     gabapentin (Neurontin) 300 mg capsule Take 2 capsules (600 mg) by mouth once daily in the morning.   12/3/2023 at AM    HumaLOG KwikPen Insulin 100 unit/mL injection INJECT SUBCUTANEOUSLY 15   UNITS IN THE MORNING AND 10UNITS WITH DINNER 30 mL 2 12/3/2023 at PM    insulin glargine (Lantus U-100 Insulin) 100 unit/mL injection INJECT SUBCUTANEOUSLY 35   UNITS IN THE MORNING AND 20UNITS IN THE EVENING 50 mL 1 12/3/2023 at PM    " "insulin lispro (HumaLOG U-100 Insulin) 100 unit/mL injection Inject under the skin.       insulin syringe-needle U-100 30G X 1/2\" 0.5 mL syringe USE TO INJECT INSULIN SC BID       lancets misc CHECK SUGAR 3X DAILY 300 each 3     losartan (Cozaar) 50 mg tablet Take 1 tablet (50 mg) by mouth once daily in the morning.   12/3/2023 at AM    multivit-min/ferrous fumarate (MULTI VITAMIN ORAL) Take 1 tablet by mouth once daily in the morning.   12/4/2023 at AM    OneTouch Ultra Test strip 4 times a day.       UNABLE TO FIND Take 1 capsule by mouth every other day. Med Name: Turmeric Curcumin   12/3/2023 at am        The list below reflectives the updated allergy list. Please review each documented allergy for additional clarification and justification.  Allergies  Reviewed by Sujata Velázquez, EMT on 12/4/2023   No Known Allergies         Below are additional concerns with the patient's PTA list.      Holley Ramírez CPhT    "

## 2023-12-04 NOTE — HOSPITAL COURSE
Mariaelena Ruiz is a 83 y.o. female with PMH of DM, L arm pain, and cataracts who presented to the hospital for leg pain. She notes that she began to have new onset pain in her left leg that started on Thursday, 11/30. The pain radiates down her leg but is especially concentrated on the back of her thigh. The pain is sharp and is exacerbated when she stands straight and is reduced when she bends forward. She has never had similar pain in the past. She denies recent or past history of trauma. She notes that her pain goes away when she sits with her knee bent. She took Vicodin, which helped with her pain. She denies any changes in bowel or bladder incontinence, but she does endorse baseline incontinence. She denies weakness of the leg or elsewhere. She denies fevers, chills, and night sweats. She denies recent infections, cuts, or scrapes.     There were no acute overnight events. This morning, the patient stated that her pain was reduced but not gone completely. She denied urinary symptoms or other infectious symptoms.      This afternoon, the patient was hypoglycemic. Confirmed with patient that her home regimen is 35 Lantus and 15 Humalog in the AM and 20 Lantus and 10 Humalog in the PM. Her AM and PM Lantus was held, sliding scale was maintained. We will recheck her glucose and modify the regimen as needed.     12/6/23: There were no acute overnight events. This morning, the patient stated that her pain was improved. She denied any changes in bowel or bladder symptoms. On exam, she had normal muscle strength of the left lower extremity. US showed no thrombus in the left upper extremity. She had no hypoglycemia symptoms this morning.     12/7/23: There were no acute overnight events. This morning, the patient stated that her pain was improved. She denied weakness of the left lower extremity and has been able to ambulate to the bathroom with her walker. She had no hypoglycemia symptoms this morning. She has no  other complaints this morning. Her colestipol was discontinued since she is not taking it here or at home. Miralax was added for constipation prevention.

## 2023-12-04 NOTE — PROGRESS NOTES
Patient called office concerned with left leg pain. Pain is so bad she can not move or stand  Patient was advised to go to ER. Patient agreed with plan and is calling squad to transport

## 2023-12-04 NOTE — H&P
HPI    HPI: Mariaelena Ruiz is a 83 y.o. female with PMH of DM, L arm pain, and cataracts who presented to the hospital for leg pain. She notes that she began to have new onset pain in her left leg that started on Thursday, 11/30. The pain radiates down her leg but is especially concentrated on the back of her thigh. The pain is sharp and is exacerbated when she stands straight and is reduced when she bends forward. She has never had similar pain in the past. She denies recent or past history of trauma. She notes that her pain goes away when she sits with her knee bent. She took Vicodin, which helped with her pain. She denies any changes in bowel or bladder incontinence, but she does endorse baseline incontinence. She denies weakness of the leg or elsewhere. She denies fevers, chills, and night sweats. She denies recent infections, cuts, or scrapes.    Chief Complaint   Patient presents with   • Leg Pain     Pt presents to the ER with left left leg and left arm pain. Pt states that it began on Thursday. Pt states that the leg pain shoots down her leg and is a 8/10, and that her left forearm to her elbow pain is a 10/10 and comes and goes. Pt has been taking aspirin at home for pain without any relief.         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 There has  also been progression of neural foraminal impingement as described..  Reversal of the lordotic curvature has also progressed, probably due  to the spondylosis.  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 There is a moderate retrolisthesis at this  level, with moderate-to-marked bilateral neural foraminal  impingement. Additional multilevel spondylosis as described.  Additional findings as above.    Micro:   - UA: Pending    Interventions:     ROS: 12 points review of system is negative except as stated in the HPI above.     Past Medical History   She has a past medical history of Acute cystitis without hematuria (08/19/2019), Body mass index (BMI)40.0-44.9, adult (09/30/2021), Epidermal cyst (01/06/2022), Hyperglycemia, unspecified, Hypertensive chronic kidney disease with stage 1 through stage 4 chronic kidney disease, or unspecified chronic kidney disease (06/17/2021), Incontinence without sensory awareness (06/19/2017), Local infection of the skin and subcutaneous tissue, unspecified (12/19/2018), Morbid (severe) obesity due to excess calories (CMS/HCC) (01/07/2022), Morbid (severe) obesity due to excess calories (CMS/HCC) (06/18/2021), Morbid (severe) obesity due to excess calories (CMS/HCC) (09/30/2021), Other conditions influencing health status (11/17/2013), Other conditions influencing health status (01/09/2015), Other conditions influencing health status (11/17/2013), Other specified symptoms and signs involving the circulatory and respiratory systems (02/03/2020), Pain in left hip (01/22/2014), Personal history of diseases of the skin and subcutaneous tissue, Personal history of other diseases of the circulatory system, Personal history of other diseases of the musculoskeletal system and connective tissue, Personal history of other diseases of the nervous system and sense organs, Personal history of other diseases of the respiratory system, Personal history of other endocrine, nutritional and metabolic disease, Personal history of other endocrine, nutritional and metabolic disease, Personal history of other endocrine, nutritional and metabolic disease, Personal history of other endocrine,  nutritional and metabolic disease, Personal history of other infectious and parasitic diseases, Personal history of other infectious and parasitic diseases, Personal history of other medical treatment, Personal history of other specified conditions (01/17/2018), Personal history of other specified conditions, Personal history of transient ischemic attack (TIA), and cerebral infarction without residual deficits, and Traumatic ischemia of muscle, subsequent encounter (05/02/2020).  Surgical History     Past Surgical History:   Procedure Laterality Date   • BUNIONECTOMY  02/08/2017    Simple Bunion Exostectomy ([facility] Procedure)   • COLONOSCOPY  02/08/2017    Colonoscopy   • CT GUIDED PERCUTANEOUS BIOPSY BONE  7/18/2013    CT GUIDED PERCUTANEOUS BIOPSY BONE 7/18/2013 [facility] CLINICAL LEGACY   • CT GUIDED PERCUTANEOUS BIOPSY BONE DEEP  11/15/2013    CT GUIDED PERCUTANEOUS BIOPSY BONE DEEP 11/15/2013 [facility] ANCILLARY LEGACY   • CYSTOSCOPY  02/08/2017    Diagnostic Cystoscopy   • HIP SURGERY  02/08/2017    Hip Surgery   • MR HEAD ANGIO WO IV CONTRAST  8/9/2014    MR HEAD ANGIO WO IV CONTRAST 8/9/2014 [facility] CLINICAL LEGACY   • OTHER SURGICAL HISTORY  08/07/2013    Laminectomy Decompress, Facetectomy, Foraminotomy Cervic Seg   • OTHER SURGICAL HISTORY  02/08/2017    Tunneled (Catheter) Central IV Line Broviac / Arroyo   • OTHER SURGICAL HISTORY  02/08/2017    Remote Analysis Of Technical Componenent Of Implantable Loop Recorder     Family History   No family history on file.  Social History     Social History     Socioeconomic History   • Marital status:      Spouse name: Not on file   • Number of children: Not on file   • Years of education: Not on file   • Highest education level: Not on file   Occupational History   • Not on file   Tobacco Use   • Smoking status: Never   • Smokeless tobacco: Never   Vaping Use   • Vaping Use: Never used   Substance and Sexual Activity   • Alcohol use: Not on file   • Drug use: Not on  "file   • Sexual activity: Not on file   Other Topics Concern   • Not on file   Social History Narrative   • Not on file     Social Determinants of Health     Financial Resource Strain: Not on file   Food Insecurity: Not on file   Transportation Needs: Not on file   Physical Activity: Not on file   Stress: Not on file   Social Connections: Not on file   Intimate Partner Violence: Not on file   Housing Stability: Not on file       Tobacco Use: Low Risk  (7/6/2023)    Patient History    • Smoking Tobacco Use: Never    • Smokeless Tobacco Use: Never    • Passive Exposure: Not on file        Social History     Substance and Sexual Activity   Alcohol Use None      Allergies   No Known Allergies   Meds    Scheduled medications  amLODIPine, 5 mg, oral, Daily  aspirin, 81 mg, oral, Daily  atorvastatin, 20 mg, oral, Daily  cholecalciferol, 2,000 Units, oral, Daily  colestipol, 1 g, oral, Daily  dorzolamide, 1 drop, Both Eyes, BID  DULoxetine, 60 mg, oral, Daily  gabapentin, 300 mg, oral, BID  heparin (porcine), 5,000 Units, subcutaneous, q8h  insulin glargine, 20 Units, subcutaneous, Nightly  [START ON 12/5/2023] insulin glargine, 35 Units, subcutaneous, q AM  insulin lispro, 0-10 Units, subcutaneous, TID with meals  insulin lispro, 10 Units, subcutaneous, TID with meals  losartan, 50 mg, oral, Daily      Continuous medications     PRN medications  PRN medications: acetaminophen, dextrose 10 % in water (D10W), dextrose 10 % in water (D10W), dextrose, dextrose, glucagon, glucagon         Objective     Vitals  Visit Vitals  /72 (BP Location: Left arm, Patient Position: Sitting)   Pulse 81   Temp 37.1 °C (98.8 °F) (Tympanic)   Resp 21   Ht 1.575 m (5' 2\")   Wt 98.9 kg (218 lb)   SpO2 96%   BMI 39.87 kg/m²   Smoking Status Never   BSA 2.08 m²        Physical Examination:   Gen: well appearing, in NAD  HEENT: AT/NC, no conjunctival pallor, anicteric sclera, EOMI   Neck: supple, no LAD/JVD  Chest: CTAB, no wheezing / labored " "respirations  CVS: RRR, no murmurs  Abd: soft, flat, NT/ND, BS+  Ext: no cyanosis/clubbing or pedal edema; Sitting with her left knee bent to reduce the pain  Neuro: AOx3, motor 5/5 globally, sensation intact    I/Os  No intake or output data in the 24 hours ending 12/04/23 1739    Labs:   Results from last 72 hours   Lab Units 12/04/23  1707   SODIUM mmol/L 145   POTASSIUM mmol/L 4.5   CHLORIDE mmol/L 103   CO2 mmol/L 26   BUN mg/dL 28*   CREATININE mg/dL 1.08*   GLUCOSE mg/dL 139*   CALCIUM mg/dL 9.2   ANION GAP mmol/L 21*   EGFR mL/min/1.73m*2 51*      Results from last 72 hours   Lab Units 12/04/23  1707   WBC AUTO x10*3/uL 14.3*   HEMOGLOBIN g/dL 13.4   HEMATOCRIT % 43.3   PLATELETS AUTO x10*3/uL 272   NEUTROS PCT AUTO % 78.8   LYMPHS PCT AUTO % 12.9   MONOS PCT AUTO % 6.4   EOS PCT AUTO % 1.0      Lab Results   Component Value Date    CALCIUM 9.2 12/04/2023    PHOS 3.8 08/23/2018      Lab Results   Component Value Date    CRP 0.84 01/06/2022      [unfilled]     Micro/ID:   Susceptibility data from last 90 days.  Collected Specimen Info Organism Amoxicillin/Clavulanate Ampicillin Ampicillin/Sulbactam Cefazolin Cefepime Ciprofloxacin Gentamicin Nitrofurantoin Piperacillin/Tazobactam Trimethoprim/Sulfamethoxazole   10/06/23 Urine from Clean Catch/Voided Citrobacter freundii complex R R R R S S S S S S                    No lab exists for component: \"AGALPCRNB\"   .ID  Lab Results   Component Value Date    URINECULTURE >100,000 Citrobacter freundii complex (A) 10/06/2023     Images    CT lumbar spine wo IV contrast  Narrative: Interpreted By:  Leoncio Barajas,   STUDY:  CT LUMBAR SPINE WO IV CONTRAST  12/4/2023 3:25 pm      INDICATION:  Signs/Symptoms:lower back pain with radiation into left leg      COMPARISON:  CT of the abdomen and pelvis of 03/21/2023      ACCESSION NUMBER(S):  [id number]      ORDERING CLINICIAN:  NAWAF NYE      TECHNIQUE:  Transaxial helical scans with orthogonal reconstructions  .    "   FINDINGS:  OSSEOUS STRUCTURES:  Deformity of the L4 and L5 vertebral bodies, which is most likely  from moderate remote compression deformities with secondary  ankylosis. There is also marked retropulsion by proximally 1.6 cm  with laminectomy defect and marked right apophyseal bony hypertrophy  which may be from heterotopic bone formation, and probably posterior  bone graft fusion. There is also mild hypertrophy of the left  apophyseal joint with ankylosis, also probably with previous bone  graft fusion. There is also a fairly large Schmorl's node or  interbody disc herniation at the inferior endplate of the L2  vertebral body, and mild compression deformity of the superior  endplate of the L3 vertebral body. These findings overall are similar  to the previous CT. Otherwise no acute bony fractures.      ALIGNMENT:  Within normal limits in the AP plane without significant scoliosis.      LOWER THORACIC SPINE:  Mild-to-moderate spondylosis with moderate narrowing of the T9-10  disc interspace and vacuum phenomena at the T10-11 and T11-12 disc  interspaces, and with moderate marginal osteophyte formation at these  levels.      T12-L1:  The disc space is maintained. There is mild hypertrophy of the  posterior elements.      L1-2:  Mild narrowing of the disc interspace with mild vacuum phenomena and  mild marginal osteophyte formation. There is mild hypertrophy of the  posterior elements with a mild canal stenosis.      L2-3:  Mild vacuum phenomena of the disc interspace with mild posterior disc  bulge, and with mild anterior marginal osteophyte formation and  anterior disc bulge. There is moderate hypertrophy of the posterior  elements with a moderate canal stenosis, and fairly marked right and  moderate left foraminal impingement.      L3-4:  Mild vacuum phenomena of the disc. Mild anterolisthesis. Moderate  hypertrophy of the apophyseal joints greater on the right, and also  probably with bone graft fusion  inferiorly.      L4-5:  Again fairly marked retropulsion from ankylosed compression  deformities of the C4 and C5 vertebral bodies. Again there is  ankylosis of the apophyseal joints particularly on the right with  virtual obliteration of the right neuroforamen, and moderate  impingement of the left neuroforamen. Status post laminectomy.      L5-S1:  Moderate-to-marked narrowing of the disc interspace with moderate  anterior marginal osteophyte formation. There is a moderate  retrolisthesis. There is mild hypertrophy of the apophyseal joints  but with moderate-to-marked bilateral foraminal impingement.      ADDITIONAL FINDINGS:  An approximate 2 cm calculus is present within a right midpole  infundibulum. Fluid attenuation superior to this is probably a 2.5 cm  parapelvic cyst. Moderate atherosclerotic calcifications are noted  predominantly at the aorta and iliac system. Mild diverticulosis of  the sigmoid colon.      Impression: Remote compression deformities with ankylosis of the L4 and L5  vertebral bodies, with L4-5 laminectomy and apophyseal hypertrophy  and bone graft fusion. There is a moderate retrolisthesis at this  level, with moderate-to-marked bilateral neural foraminal  impingement. Additional multilevel spondylosis as described.  Additional findings as above.      Signed by: Leoncio Barajas 12/4/2023 4:03 PM  Dictation workstation:   [id number]  CT cervical spine wo IV contrast  Narrative: Interpreted By:  Leoncio Barajas,   STUDY:  CT CERVICAL SPINE WO IV CONTRAST;  12/4/2023 3:25 pm      INDICATION:  Left upper arm pain      COMPARISON:  02/10/2015      ACCESSION NUMBER(S):  [id number]      ORDERING CLINICIAN:  NAWAF NYE      TECHNIQUE:  Transaxial images with orthogonal reconstructions      FINDINGS:  VERTEBRAL ALIGNMENT:  Reversal of the lordotic curvature that may be positional, and from  spondylosis. However, spasm is possible.. This has increased from the  prior exam.      CRANIOCERVICAL  JUNCTION:  Unremarkable      BONY STRUCTURES:  No fracture or dislocation are evident.      THE CERVICAL LEVELS INCLUDING DISC SPACES, APOPHYSEAL AND  UNCOVERTEBRAL JOINTS:  Moderate spondylosis has progressed since the  previous exam. This includes hypertrophy at the left lateral masses  of the C1-C2 vertebral bodies, and moderate hypertrophy of the left  C2-3 apophyseal joint.. There is a mild anterior subluxation at the  C2-3 level. Marked narrowing of the C3-4 disc interspace, probably  with development of ankylosis anteriorly. There is also been  development of ankylosis of the apophyseal joints greater on the  right. Marked narrowing of the C4-5 disc interspace with a moderate  retrolisthesis and marked anterior marginal osteophyte formation.  Uncovertebral hypertrophy results in fairly marked bilateral  foraminal impingement. Marked narrowing of the C5-6 disc interspace  with moderate marginal osteophyte formation, and moderate left and  mild right foraminal impingement with uncovertebral hypertrophy.  Marked narrowing of the C6-7 disc interspace with moderate to marked  anterior marginal osteophyte formation and moderate-to-marked  bilateral neural foraminal impingement with uncovertebral  hypertrophy..      ADDITIONAL FINDINGS:  Mild atherosclerotic calcification is noted at the left carotid  bifurcation..      Impression: Moderate spondylosis progressed from the prior examination, and in  particular now with ankylosis of the C3-4 apophyseal joints greater  on the right, and partial ankylosis at the disc interspace. There has  also been progression of neural foraminal impingement as described..  Reversal of the lordotic curvature has also progressed, probably due  to the spondylosis.      Signed by: Leoncio Barajas 12/4/2023 3:44 PM  Dictation workstation:   [id number]    Assessment and Plan    Problem list:  Principal Problem:    Sciatica of left side  Active Problems:    Low back pain      Mariaelena Ruiz  is a 83 y.o. female admitted for leg pain.     Acute Medical Issues   #Sciatica, Low Back Pain Secondary to Compression Deformity  -Consider consult to orthopedic surgery/neurosurgery if pain worsens or if neurological weakness develops  -continue home gabapentin and duloxetine for pain   -Consulted PT/OT  -Pain control; Tylenol scheduled for mild pain, Oxycodone for moderate, and Dilaudid for severe/breakthrough pain    Chronic Medical Issues   #DM  -Adult diet with carb control   -Monitor labs  -Continue home Lantus 35 units in morning, 20 at night  -Mild SSI    #CKD3a  -Monitor daily renal labs     #HTN  -cont amlodipine and losartan home meds    #HLD  -continue atorvastatin home med      F: PO intake & IVF PRN   E: Replete PRN  N: Adult diet with carb control  GI ppx: None  DVT ppx: Heparin  Antibiotics:   Tubes/Lines/Drains:     Code Status: Full Code  Emergency Contact: Extended Emergency Contact Information  Primary Emergency Contact: Obey Ruiz  Home Phone: [phone]  Work Phone: [phone]  Relation: Child     Disposition: 83 y.o.female admitted for leg pain. Anticipate LOS < 48 hrs. Observation status.     Tianna Stevenson MD  PGY-1      Disclaimer: Documentation completed with the information available at the time of input. The times in the chart may not be reflective of actual patient care times, interventions, or procedures. Documentation occurs after the physical care of the patient.

## 2023-12-04 NOTE — ED PROVIDER NOTES
HPI   Chief Complaint   Patient presents with    Leg Pain     Pt presents to the ER with left left leg and left arm pain. Pt states that it began on Thursday. Pt states that the leg pain shoots down her leg and is a 8/10, and that her left forearm to her elbow pain is a 10/10 and comes and goes. Pt has been taking aspirin at home for pain without any relief.         History of present illness:  83-year-old female presents to the emergency room for complaints of low back pain with radiation to her left leg.  The patient states began having pain a couple days ago and she states that she did not have any falls or injuries but she states this began hurting and she states it starts just above her left buttocks and radiates down into the back of her left thigh and sometimes in the back of her left calf.  She states that she has no difficulty bending or flexing her left ankle and she does have a walker at home that she has been using to get around with but she states the pain is persistent.  She is unsure of her past medical history but states that she did have surgery on her back in the past due to a possible infection?  But she states this was many years ago and she has not had issues since then.  She is unsure what medication she takes daily but states that she is post be taking insulin daily.    Social history: Negative for alcohol and drug use.    Review of systems:   Gen.: No weight loss, fatigue, anorexia, insomnia, fever.   Eyes: No vision loss, double vision  ENT: No pharyngitis, dry mouth.   Cardiac: No chest pain, palpitations, syncope, near syncope.   Pulmonary: No shortness of breath, cough, hemoptysis.   Heme/lymph: No swollen glands, fever, bleeding.   GI: No abdominal pain, change in bowel habits, melena, hematemesis, hematochezia, nausea, vomiting, diarrhea.   : No discharge, dysuria, frequency, urgency, hematuria.   Musculoskeletal: No  joint pain, joint swelling.   Skin: No rashes.   Review of systems  is otherwise negative unless stated above or in history of present illness.        Physical exam:  General: Vitals noted, no distress. Afebrile.   EENT: No lymphadenopathy appreciated  Cardiac: Regular, rate, rhythm, no murmur.   Pulmonary: Lungs clear bilaterally with good aeration. No adventitious breath sounds.   Abdomen: Soft, nonsurgical. Nontender. No peritoneal signs. Normoactive bowel sounds.   Spine: No pain to palpation across the spine no step-off appreciated  Extremities: No peripheral edema.  Full dorsiflexion and extension present in both ankles,  Skin: No rash.   Neuro: No focal neurologic deficits      Medical decision making:   Testing: CT scan of lumbar spine and cervical spine without contrast: She has extensive arthritic changes as well as worsening spondylolithiasis as interpreted by radiology  Treatment: Vicodin p.o. given  Plan: Home-going.  Discussed differential. Will follow-up with the primary physician in the next 2-3 days. Return if worse. They understand return precautions and discharge instructions. Patient and family/friend/caregiver are in agreement with this plan. 83-year-old female presents to the emergency room for complaints of low back pain with radiation to her left leg.  The patient states began having pain a couple days ago and she states that she did not have any falls or injuries but she states this began hurting and she states it starts just above her left buttocks and radiates down into the back of her left thigh and sometimes in the back of her left calf.  She states that she has no difficulty bending or flexing her left ankle and she does have a walker at home that she has been using to get around with but she states the pain is persistent.  She is unsure of her past medical history but states that she did have surgery on her back in the past due to a possible infection?  But she states this was many years ago and she has not had issues since then.  She is unsure what  medication she takes daily but states that she is post be taking insulin daily. Spine: No pain to palpation across the spine no step-off appreciated. Extremities: No peripheral edema.  Full dorsiflexion and extension present in both ankles.  Explained to the patient the test results and my concerns about her going home at this time.  The patient was ambulated to ambulate very short distance before she had to stop due to the pain.  I explained to her bedside that she could be admitted for further care and evaluation by physical therapy and Occupational Therapy and possible placement to rehabilitation center.  She initially was reluctant to this plan but eventually relented after the ambulation test.  She was admitted to the care of the hospital at this time for further management care.  Impression:   1.  Sciatica            History provided by:  Patient   used: No                        Richard Coma Scale Score: 15                  Patient History   Past Medical History:   Diagnosis Date    Acute cystitis without hematuria 08/19/2019    Acute cystitis without hematuria    Body mass index (BMI)40.0-44.9, adult 09/30/2021    Body mass index (BMI) of 40.0 to 44.9 in adult    Epidermal cyst 01/06/2022    Epidermal cyst    Hyperglycemia, unspecified     Hemoglobin A1c between 7.0% and 9.0%    Hypertensive chronic kidney disease with stage 1 through stage 4 chronic kidney disease, or unspecified chronic kidney disease 06/17/2021    Benign hypertension with chronic kidney disease, stage II    Incontinence without sensory awareness 06/19/2017    Urinary incontinence without sensory awareness    Local infection of the skin and subcutaneous tissue, unspecified 12/19/2018    Staph skin infection    Morbid (severe) obesity due to excess calories (CMS/[facility]) 01/07/2022    Class 3 severe obesity due to excess calories with serious comorbidity and body mass index (BMI) of 45.0 to 49.9 in adult    Morbid (severe)  obesity due to excess calories (CMS/[facility]) 06/18/2021    Class 3 severe obesity with serious comorbidity and body mass index (BMI) of 40.0 to 44.9 in adult, unspecified obesity type    Morbid (severe) obesity due to excess calories (CMS/[facility]) 09/30/2021    Class 3 severe obesity with serious comorbidity in adult    Other conditions influencing health status 11/17/2013    Diabetes With Diabetic Amyotrophy    Other conditions influencing health status 01/09/2015    Lumbar Neuritis L2 - L3    Other conditions influencing health status 11/17/2013    Lumbar Neuritis L3 - L4    Other specified symptoms and signs involving the circulatory and respiratory systems 02/03/2020    Bruit of left carotid artery    Pain in left hip 01/22/2014    Left hip pain    Personal history of diseases of the skin and subcutaneous tissue     History of rosacea    Personal history of other diseases of the circulatory system     History of hypertension    Personal history of other diseases of the musculoskeletal system and connective tissue     History of osteoarthritis    Personal history of other diseases of the nervous system and sense organs     History of retinopathy    Personal history of other diseases of the respiratory system     History of bronchitis    Personal history of other endocrine, nutritional and metabolic disease     History of type 2 diabetes mellitus    Personal history of other endocrine, nutritional and metabolic disease     History of hyperlipidemia    Personal history of other endocrine, nutritional and metabolic disease     History of obesity    Personal history of other endocrine, nutritional and metabolic disease     History of diabetic retinopathy    Personal history of other infectious and parasitic diseases     History of onychomycosis    Personal history of other infectious and parasitic diseases     History of candidiasis    Personal history of other medical treatment     History of stress test    Personal history  of other specified conditions 01/17/2018    History of fatigue    Personal history of other specified conditions     History of fatigue    Personal history of transient ischemic attack (TIA), and cerebral infarction without residual deficits     History of transient cerebral ischemia    Traumatic ischemia of muscle, subsequent encounter 05/02/2020    Traumatic rhabdomyolysis, subsequent encounter     Past Surgical History:   Procedure Laterality Date    BUNIONECTOMY  02/08/2017    Simple Bunion Exostectomy ([facility] Procedure)    COLONOSCOPY  02/08/2017    Colonoscopy    CT GUIDED PERCUTANEOUS BIOPSY BONE  7/18/2013    CT GUIDED PERCUTANEOUS BIOPSY BONE 7/18/2013 [facility] CLINICAL LEGACY    CT GUIDED PERCUTANEOUS BIOPSY BONE DEEP  11/15/2013    CT GUIDED PERCUTANEOUS BIOPSY BONE DEEP 11/15/2013 [facility] ANCILLARY LEGACY    CYSTOSCOPY  02/08/2017    Diagnostic Cystoscopy    HIP SURGERY  02/08/2017    Hip Surgery    MR HEAD ANGIO WO IV CONTRAST  8/9/2014    MR HEAD ANGIO WO IV CONTRAST 8/9/2014 [facility] CLINICAL LEGACY    OTHER SURGICAL HISTORY  08/07/2013    Laminectomy Decompress, Facetectomy, Foraminotomy Cervic Seg    OTHER SURGICAL HISTORY  02/08/2017    Tunneled (Catheter) Central IV Line Broviac / Arroyo    OTHER SURGICAL HISTORY  02/08/2017    Remote Analysis Of Technical Componenent Of Implantable Loop Recorder     No family history on file.  Social History     Tobacco Use    Smoking status: Never    Smokeless tobacco: Never   Vaping Use    Vaping Use: Never used   Substance Use Topics    Alcohol use: Not on file    Drug use: Not on file       Physical Exam   ED Triage Vitals [12/04/23 1402]   Temp Heart Rate Resp BP   37.1 °C (98.8 °F) 81 21 136/72      SpO2 Temp Source Heart Rate Source Patient Position   96 % Tympanic Monitor Sitting      BP Location FiO2 (%)     Left arm --       Physical Exam    ED Course & MDM   Diagnoses as of 12/04/23 1637   Sciatica of left side   Cervical radiculopathy       Medical Decision  Making      Procedure  Procedures     Raghu Dodge PA-C  12/04/23 2024

## 2023-12-05 LAB
ALBUMIN SERPL BCP-MCNC: 3.6 G/DL (ref 3.4–5)
ALP SERPL-CCNC: 91 U/L (ref 33–136)
ALT SERPL W P-5'-P-CCNC: 12 U/L (ref 7–45)
ANION GAP SERPL CALC-SCNC: 14 MMOL/L (ref 10–20)
APPEARANCE UR: ABNORMAL
AST SERPL W P-5'-P-CCNC: 21 U/L (ref 9–39)
BACTERIA #/AREA URNS AUTO: ABNORMAL /HPF
BILIRUB SERPL-MCNC: 0.6 MG/DL (ref 0–1.2)
BILIRUB UR STRIP.AUTO-MCNC: NEGATIVE MG/DL
BUN SERPL-MCNC: 27 MG/DL (ref 6–23)
CALCIUM SERPL-MCNC: 8.8 MG/DL (ref 8.6–10.3)
CHLORIDE SERPL-SCNC: 109 MMOL/L (ref 98–107)
CO2 SERPL-SCNC: 27 MMOL/L (ref 21–32)
COLOR UR: YELLOW
CREAT SERPL-MCNC: 1.11 MG/DL (ref 0.5–1.05)
ERYTHROCYTE [DISTWIDTH] IN BLOOD BY AUTOMATED COUNT: 14.6 % (ref 11.5–14.5)
GFR SERPL CREATININE-BSD FRML MDRD: 49 ML/MIN/1.73M*2
GLUCOSE BLD MANUAL STRIP-MCNC: 145 MG/DL (ref 74–99)
GLUCOSE BLD MANUAL STRIP-MCNC: 202 MG/DL (ref 74–99)
GLUCOSE BLD MANUAL STRIP-MCNC: 49 MG/DL (ref 74–99)
GLUCOSE BLD MANUAL STRIP-MCNC: 70 MG/DL (ref 74–99)
GLUCOSE BLD MANUAL STRIP-MCNC: 72 MG/DL (ref 74–99)
GLUCOSE BLD MANUAL STRIP-MCNC: 87 MG/DL (ref 74–99)
GLUCOSE SERPL-MCNC: 82 MG/DL (ref 74–99)
GLUCOSE UR STRIP.AUTO-MCNC: NEGATIVE MG/DL
HCT VFR BLD AUTO: 40.6 % (ref 36–46)
HGB BLD-MCNC: 12.3 G/DL (ref 12–16)
HOLD SPECIMEN: NORMAL
KETONES UR STRIP.AUTO-MCNC: ABNORMAL MG/DL
LEUKOCYTE ESTERASE UR QL STRIP.AUTO: ABNORMAL
MAGNESIUM SERPL-MCNC: 1.96 MG/DL (ref 1.6–2.4)
MCH RBC QN AUTO: 27.3 PG (ref 26–34)
MCHC RBC AUTO-ENTMCNC: 30.3 G/DL (ref 32–36)
MCV RBC AUTO: 90 FL (ref 80–100)
NITRITE UR QL STRIP.AUTO: NEGATIVE
NRBC BLD-RTO: 0 /100 WBCS (ref 0–0)
PH UR STRIP.AUTO: 5 [PH]
PHOSPHATE SERPL-MCNC: 3.8 MG/DL (ref 2.5–4.9)
PLATELET # BLD AUTO: 281 X10*3/UL (ref 150–450)
POTASSIUM SERPL-SCNC: 3.7 MMOL/L (ref 3.5–5.3)
PROT SERPL-MCNC: 6.3 G/DL (ref 6.4–8.2)
PROT UR STRIP.AUTO-MCNC: ABNORMAL MG/DL
RBC # BLD AUTO: 4.5 X10*6/UL (ref 4–5.2)
RBC # UR STRIP.AUTO: ABNORMAL /UL
RBC #/AREA URNS AUTO: ABNORMAL /HPF
SODIUM SERPL-SCNC: 146 MMOL/L (ref 136–145)
SP GR UR STRIP.AUTO: 1.02
SQUAMOUS #/AREA URNS AUTO: ABNORMAL /HPF
UROBILINOGEN UR STRIP.AUTO-MCNC: <2 MG/DL
WBC # BLD AUTO: 11.5 X10*3/UL (ref 4.4–11.3)
WBC #/AREA URNS AUTO: >50 /HPF
WBC CLUMPS #/AREA URNS AUTO: ABNORMAL /HPF

## 2023-12-05 PROCEDURE — 87186 SC STD MICRODIL/AGAR DIL: CPT | Mod: GEALAB | Performed by: PHYSICIAN ASSISTANT

## 2023-12-05 PROCEDURE — 97162 PT EVAL MOD COMPLEX 30 MIN: CPT | Mod: GP

## 2023-12-05 PROCEDURE — 83735 ASSAY OF MAGNESIUM: CPT

## 2023-12-05 PROCEDURE — 85027 COMPLETE CBC AUTOMATED: CPT

## 2023-12-05 PROCEDURE — 36415 COLL VENOUS BLD VENIPUNCTURE: CPT

## 2023-12-05 PROCEDURE — 82947 ASSAY GLUCOSE BLOOD QUANT: CPT | Mod: 59

## 2023-12-05 PROCEDURE — 2500000001 HC RX 250 WO HCPCS SELF ADMINISTERED DRUGS (ALT 637 FOR MEDICARE OP)

## 2023-12-05 PROCEDURE — 2500000004 HC RX 250 GENERAL PHARMACY W/ HCPCS (ALT 636 FOR OP/ED)

## 2023-12-05 PROCEDURE — 97165 OT EVAL LOW COMPLEX 30 MIN: CPT | Mod: GO

## 2023-12-05 PROCEDURE — G0378 HOSPITAL OBSERVATION PER HR: HCPCS

## 2023-12-05 PROCEDURE — 2500000002 HC RX 250 W HCPCS SELF ADMINISTERED DRUGS (ALT 637 FOR MEDICARE OP, ALT 636 FOR OP/ED)

## 2023-12-05 PROCEDURE — 80053 COMPREHEN METABOLIC PANEL: CPT

## 2023-12-05 PROCEDURE — 87086 URINE CULTURE/COLONY COUNT: CPT | Mod: GEALAB | Performed by: PHYSICIAN ASSISTANT

## 2023-12-05 PROCEDURE — 96372 THER/PROPH/DIAG INJ SC/IM: CPT

## 2023-12-05 PROCEDURE — 84100 ASSAY OF PHOSPHORUS: CPT

## 2023-12-05 PROCEDURE — 81001 URINALYSIS AUTO W/SCOPE: CPT | Performed by: PHYSICIAN ASSISTANT

## 2023-12-05 RX ADMIN — DULOXETINE HYDROCHLORIDE 60 MG: 60 CAPSULE, DELAYED RELEASE ORAL at 08:57

## 2023-12-05 RX ADMIN — AMLODIPINE BESYLATE 5 MG: 5 TABLET ORAL at 08:57

## 2023-12-05 RX ADMIN — Medication 2000 UNITS: at 08:57

## 2023-12-05 RX ADMIN — GABAPENTIN 300 MG: 300 CAPSULE ORAL at 08:57

## 2023-12-05 RX ADMIN — GABAPENTIN 300 MG: 300 CAPSULE ORAL at 21:04

## 2023-12-05 RX ADMIN — ASPIRIN 81 MG CHEWABLE TABLET 81 MG: 81 TABLET CHEWABLE at 08:57

## 2023-12-05 RX ADMIN — LOSARTAN POTASSIUM 50 MG: 50 TABLET, FILM COATED ORAL at 08:57

## 2023-12-05 RX ADMIN — HEPARIN SODIUM 5000 UNITS: 5000 INJECTION INTRAVENOUS; SUBCUTANEOUS at 06:16

## 2023-12-05 RX ADMIN — INSULIN GLARGINE 35 UNITS: 100 INJECTION, SOLUTION SUBCUTANEOUS at 09:00

## 2023-12-05 RX ADMIN — HEPARIN SODIUM 5000 UNITS: 5000 INJECTION INTRAVENOUS; SUBCUTANEOUS at 16:01

## 2023-12-05 RX ADMIN — HEPARIN SODIUM 5000 UNITS: 5000 INJECTION INTRAVENOUS; SUBCUTANEOUS at 23:18

## 2023-12-05 RX ADMIN — OXYCODONE HYDROCHLORIDE 5 MG: 5 TABLET ORAL at 09:09

## 2023-12-05 RX ADMIN — INSULIN LISPRO 10 UNITS: 100 INJECTION, SOLUTION INTRAVENOUS; SUBCUTANEOUS at 09:01

## 2023-12-05 RX ADMIN — ATORVASTATIN CALCIUM 20 MG: 20 TABLET, FILM COATED ORAL at 08:57

## 2023-12-05 ASSESSMENT — PAIN - FUNCTIONAL ASSESSMENT
PAIN_FUNCTIONAL_ASSESSMENT: 0-10

## 2023-12-05 ASSESSMENT — COGNITIVE AND FUNCTIONAL STATUS - GENERAL
DAILY ACTIVITIY SCORE: 20
DRESSING REGULAR LOWER BODY CLOTHING: A LOT
MOVING TO AND FROM BED TO CHAIR: A LITTLE
HELP NEEDED FOR BATHING: A LITTLE
STANDING UP FROM CHAIR USING ARMS: A LITTLE
TURNING FROM BACK TO SIDE WHILE IN FLAT BAD: A LITTLE
MOVING FROM LYING ON BACK TO SITTING ON SIDE OF FLAT BED WITH BEDRAILS: A LITTLE
CLIMB 3 TO 5 STEPS WITH RAILING: A LOT
MOVING TO AND FROM BED TO CHAIR: A LITTLE
MOBILITY SCORE: 17
TURNING FROM BACK TO SIDE WHILE IN FLAT BAD: A LITTLE
STANDING UP FROM CHAIR USING ARMS: A LITTLE
HELP NEEDED FOR BATHING: A LOT
CLIMB 3 TO 5 STEPS WITH RAILING: A LOT
PERSONAL GROOMING: A LITTLE
MOVING FROM LYING ON BACK TO SITTING ON SIDE OF FLAT BED WITH BEDRAILS: A LITTLE
MOVING TO AND FROM BED TO CHAIR: A LOT
WALKING IN HOSPITAL ROOM: A LOT
DAILY ACTIVITIY SCORE: 16
MOVING FROM LYING ON BACK TO SITTING ON SIDE OF FLAT BED WITH BEDRAILS: A LITTLE
STANDING UP FROM CHAIR USING ARMS: A LOT
MOBILITY SCORE: 17
DRESSING REGULAR LOWER BODY CLOTHING: A LITTLE
DRESSING REGULAR UPPER BODY CLOTHING: A LITTLE
MOBILITY SCORE: 14
WALKING IN HOSPITAL ROOM: A LITTLE
CLIMB 3 TO 5 STEPS WITH RAILING: A LOT
TURNING FROM BACK TO SIDE WHILE IN FLAT BAD: A LITTLE
WALKING IN HOSPITAL ROOM: A LITTLE
TOILETING: A LOT
TOILETING: A LITTLE
DRESSING REGULAR UPPER BODY CLOTHING: A LITTLE

## 2023-12-05 ASSESSMENT — PAIN SCALES - GENERAL
PAINLEVEL_OUTOF10: 7

## 2023-12-05 ASSESSMENT — ACTIVITIES OF DAILY LIVING (ADL)
BATHING_ASSISTANCE: MINIMAL
LACK_OF_TRANSPORTATION: NO
ADL_ASSISTANCE: INDEPENDENT

## 2023-12-05 NOTE — PROGRESS NOTES
Occupational Therapy    Evaluation    Patient Name: Mariaelena Ruiz  MRN: [id number]  Today's Date: 12/5/2023  Time Calculation  Start Time: 1104  Stop Time: 1123  Time Calculation (min): 19 min    Assessment  IP OT Assessment  OT Assessment: Pt presents with decreased activity tolerance, decreased standing tolerance, and decreased UE strength/ROM needed for functional mobility and ADL. Continued skilled OT recommended to maximize safety and independence.  Prognosis: Good  Barriers to Discharge: None  Evaluation/Treatment Tolerance: Patient limited by pain, Patient limited by fatigue  Medical Staff Made Aware: Yes  End of Session Communication: Bedside nurse  End of Session Patient Position: Bed, 2 rail up, Alarm on  Plan:  Treatment Interventions: ADL retraining, Functional transfer training, UE strengthening/ROM, Endurance training, Compensatory technique education  OT Frequency: 3 times per week  OT Discharge Recommendations: Moderate intensity level of continued care  OT Recommended Transfer Status: Assist of 1  OT - OK to Discharge: Yes (per OT POC)    Subjective   Current Problem:  1. Sciatica of left side  DISCONTINUED: HYDROcodone-acetaminophen (Norco) 5-325 mg tablet      2. Cervical radiculopathy          General:  General  Reason for Referral: Referred to OT following sciatica  Referred By: Tianna Stevenson MD  Past Medical History Relevant to Rehab: DM, L arm pain, and cataracts  Prior to Session Communication: Bedside nurse  Patient Position Received: Bed, 2 rail up, Alarm on  General Comment: Pt pleasent and agreeable to therapy. Pt persistent on wanting exercises for sciatica relief, when asked pt believes she would be fine going home with home exercise program only     Pain:  Pain Assessment  Pain Assessment: 0-10  Pain Score: 7  Pain Type: Acute pain  Pain Location: Arm  Pain Orientation: Left  Pain Interventions: Repositioned    Objective   Cognition:  Overall Cognitive Status: Within Functional  Limits    Home Living:  Type of Home: House  Lives With: Alone  Home Adaptive Equipment: Walker rolling or standard, Other (Comment) (rollator)  Home Layout: One level, Laundry in basement  Home Access: Stairs to enter with rails  Entrance Stairs-Number of Steps: ~9  Bathroom Shower/Tub: Tub/shower unit  Bathroom Toilet: Handicapped height  Bathroom Equipment: Shower chair with back  Home Living Comments: 2 adult sons living nearby, comes over to visit frequently   Prior Function:  Level of Oscar: Independent with ADLs and functional transfers, Needs assistance with homemaking  Receives Help From: Family  ADL Assistance: Independent  Homemaking Assistance: Needs assistance  Ambulatory Assistance: Independent  Prior Function Comments: Sons help with laundry, household tasks. Uses walker outside of home, rollator in home.     ADL:  Eating Assistance: Independent  Grooming Assistance: Minimal  Bathing Assistance: Minimal  UE Dressing Assistance: Minimal  LE Dressing Assistance: Moderate  Toileting Assistance with Device: Moderate  Functional Assistance: Maximal  ADL Comments: Pt able to doff socks with figure 4/forward flexion, required assist to don d/t pain. Other ADL performance anticipated d/t current clinical presentation.  Activity Tolerance:  Endurance: Tolerates less than 10 min exercise, no significant change in vital signs  Bed Mobility/Transfers: Bed Mobility  Bed Mobility: Yes  Bed Mobility 1  Bed Mobility 1: Supine to sitting, Sitting to supine  Level of Assistance 1: Distant supervision  Bed Mobility Comments 1: using bed rails to elevate    Transfers  Transfer: Yes  Transfer 1  Transfer From 1: Sit to  Transfer to 1: Stand  Technique 1: Sit to stand, Stand to sit  Transfer Device 1: Walker  Transfer Level of Assistance 1: Minimum assistance  Trials/Comments 1: assist at UE to elevate, increased time needed to stabilize in stance. Unable to tolerate >5 seconds of static stance d/t pain in LE.      Sitting Balance:  Static Sitting Balance  Static Sitting-Balance Support: Feet supported, No upper extremity supported  Static Sitting-Level of Assistance: Close supervision    Strength:  Strength Comments: BUE grossly 3-/5    Hand Function:  Hand Function  Gross Grasp: Functional (grasp strength fair)  Coordination: Impaired (fair thumb opposition)  Extremities: RUE   RUE : Exceptions to WFL  RUE AROM (degrees)  RUE AROM Comment: Shoulder flexion grossly 90 degrees, IR/ER impaired. elbow to distal WFL and LUE   LUE: Exceptions to WFL  LUE AROM (degrees)  LUE AROM Comment: Shoulder flexion grossly 90 degrees, IR/ER impaired. elbow to distal WFL    Outcome Measures: [facility] Daily Activity  Putting on and taking off regular lower body clothing: A lot  Bathing (including washing, rinsing, drying): A lot  Putting on and taking off regular upper body clothing: A little  Toileting, which includes using toilet, bedpan or urinal: A lot  Taking care of personal grooming such as brushing teeth: A little  Eating Meals: None  Daily Activity - Total Score: 16      Education Documentation  Body Mechanics, taught by Raghu Almaguer OT at 12/5/2023 12:13 PM.  Learner: Patient  Readiness: Acceptance  Method: Explanation  Response: Verbalizes Understanding    Precautions, taught by Raghu Almaguer OT at 12/5/2023 12:13 PM.  Learner: Patient  Readiness: Acceptance  Method: Explanation  Response: Verbalizes Understanding    ADL Training, taught by Raghu Almaguer OT at 12/5/2023 12:13 PM.  Learner: Patient  Readiness: Acceptance  Method: Explanation  Response: Verbalizes Understanding    Education Comments  No comments found.      Goals:   Encounter Problems       Encounter Problems (Active)       OT Goals       Pt will tolerate 10min stand during functional task completion with no more than 1 rest break in order to increase endurance for functional task completion.  (Progressing)       Start:  12/05/23    Expected End:   12/19/23            Pt will increase static/dynamic stand to increase safety and independence with functional task completion.   (Progressing)       Start:  12/05/23    Expected End:  12/19/23            Pt will demo ADL routine and meaningful daily activities using modifications as needed  (Progressing)       Start:  12/05/23    Expected End:  12/19/23            Pt will demo increased functional mobility to tolerate tasks necessary to complete ADL routine.  (Progressing)       Start:  12/05/23    Expected End:  12/19/23            Pt will demo and/or verbalize 2-3 energy conservation techniques to incorporate into functional mobility or ADL to improve performance and increase independence.  (Progressing)       Start:  12/05/23    Expected End:  12/19/23

## 2023-12-05 NOTE — PROGRESS NOTES
Physical Therapy    Physical Therapy Evaluation    Patient Name: Mariaelena Ruiz  MRN: [id number]  Today's Date: 12/5/2023   Time Calculation  Start Time: 1336  Stop Time: 1358  Time Calculation (min): 22 min    Assessment/Plan   PT Assessment  PT Assessment Results: Decreased strength, Decreased endurance, Decreased mobility, Obesity, Pain (deconditioning)  Rehab Prognosis: Good  Evaluation/Treatment Tolerance: Patient limited by fatigue, Patient limited by pain  Medical Staff Made Aware: Yes  End of Session Communication: Bedside nurse  End of Session Patient Position: Bed, 3 rail up, Alarm on  IP OR SWING BED PT PLAN  Inpatient or Swing Bed: Inpatient  PT Plan  Treatment/Interventions: Bed mobility, Transfer training, Gait training, Strengthening, Therapeutic exercise  PT Plan: Skilled PT  PT Frequency: 3 times per week  Equipment Recommended upon Discharge: Wheeled walker  PT - OK to Discharge: Yes (progress towards goals)      Subjective   General Visit Information:  General  Reason for Referral:  (84 yo female admitted 2' to L arm and leg pain, sciatica)  Referred By:  (Dr. JUAN Trimble)  Past Medical History Relevant to Rehab:  (DM, arm pain)  Prior to Session Communication: Bedside nurse  Patient Position Received: Bed, 3 rail up, Alarm on  General Comment:  (Pt supine, pleasant and agreeable to work with PT. Pt is moving cautiously but, gave good effort. based on her current status, recommend Pt have MODERATE intensity level follow up PT services.)  Home Living:  Home Living  Type of Home: House  Lives With: Alone  Home Adaptive Equipment: Walker rolling or standard  Home Layout: Two level (1st floor set up for Pt)  Home Access: Stairs to enter with rails  Entrance Stairs-Rails: Right  Entrance Stairs-Number of Steps:  (9)  Bathroom Shower/Tub: Tub/shower unit  Bathroom Toilet: Standard  Bathroom Equipment: Shower chair with back  Prior Level of Function:  Prior Function Per Pt/Caregiver Report  Level of  Story: Independent with ADLs and functional transfers, Independent with homemaking with ambulation (with her wheeled walker)  Receives Help From: Family  Ambulatory Assistance: Independent  Precautions:     Vital Signs:       Objective   Pain:  Pain Assessment  Pain Assessment: 0-10  Pain Score: 7  Pain Type: Acute pain  Pain Location: Arm  Pain Orientation: Left  Cognition:  Cognition  Overall Cognitive Status: Within Functional Limits    General Assessments:                          Static Sitting Balance  Static Sitting-Balance Support: Bilateral upper extremity supported  Static Sitting-Level of Assistance: Close supervision    Static Standing Balance  Static Standing-Balance Support: Bilateral upper extremity supported (wheeled walker)  Static Standing-Level of Assistance: Minimum assistance, Moderate assistance (1 person)  Dynamic Standing Balance  Dynamic Standing-Balance Support: Bilateral upper extremity supported (wheeled walker)  Dynamic Standing-Comments:  (Minimum/Moderate assist)  Functional Assessments:  Bed Mobility  Bed Mobility: Yes  Bed Mobility 1  Bed Mobility 1: Supine to sitting, Sitting to supine  Level of Assistance 1: Minimum assistance (1 person)    Transfers  Transfer: Yes  Transfer 1  Transfer From 1: Bed to  Transfer to 1: Stand  Technique 1: Sit to stand, Stand to sit  Transfer Device 1:  (wheeled walker)  Transfer Level of Assistance 1: Minimum assistance, Moderate assistance (1 person)    Ambulation/Gait Training  Ambulation/Gait Training Performed: Yes  Ambulation/Gait Training 1  Surface 1: Level tile  Device 1: Rolling walker  Assistance 1: Minimum assistance, Moderate assistance (1 person)  Quality of Gait 1: Decreased step length (decreased eli)  Comments/Distance (ft) 1:  (5 side steps up to HOB)  Extremity/Trunk Assessments:  RLE   RLE : Within Functional Limits  LLE   LLE : Within Functional Limits  Outcome Measures:  [facility] Basic Mobility  Turning from your back to  your side while in a flat bed without using bedrails: A little  Moving from lying on your back to sitting on the side of a flat bed without using bedrails: A little  Moving to and from bed to chair (including a wheelchair): A lot  Standing up from a chair using your arms (e.g. wheelchair or bedside chair): A lot  To walk in hospital room: A lot  Climbing 3-5 steps with railing: A lot  Basic Mobility - Total Score: 14    Encounter Problems       Encounter Problems (Active)       Mobility       STG - Patient will ambulate 50-80 feet DAPHNE with wheeled walker (Progressing)       Start:  12/05/23    Expected End:  12/19/23               Transfers       STG - Patient to transfer to and from sit to supine independently  (Progressing)       Start:  12/05/23    Expected End:  12/19/23            STG - Patient will transfer sit to and from stand MOD I with wheeled walker (Progressing)       Start:  12/05/23    Expected End:  12/19/23                   Education Documentation  No documentation found.  Education Comments  No comments found.

## 2023-12-05 NOTE — PROGRESS NOTES
12/05/23 1212   Discharge Planning   Living Arrangements Alone   Support Systems Children   Assistance Needed Alert and oriented x 3, Normally independent, Rollator, Walker, Shower chair, Drives,   Type of Residence Private residence   Number of Stairs to Enter Residence 9   Number of Stairs Within Residence 2   Do you have animals or pets at home? No   Who is requesting discharge planning? Provider   Home or Post Acute Services Other (Comment)  (TBD PT/OT evaluations and recommendations are pending)   Patient expects to be discharged to: Not sure at this time. PT/OT evaluations and recommendations pending, Patient has been to skilled rehab in the past and if recommended would be agreeable to go again.   Does the patient need discharge transport arranged? No   Financial Resource Strain   How hard is it for you to pay for the very basics like food, housing, medical care, and heating? Not hard   Housing Stability   In the last 12 months, was there a time when you were not able to pay the mortgage or rent on time? N   In the last 12 months, how many places have you lived? 1   In the last 12 months, was there a time when you did not have a steady place to sleep or slept in a shelter (including now)? N   Transportation Needs   In the past 12 months, has lack of transportation kept you from medical appointments or from getting medications? no   In the past 12 months, has lack of transportation kept you from meetings, work, or from getting things needed for daily living? No   Patient Choice   Provider Choice list and CMS website (https://medicare.gov/care-compare#search) for post-acute Quality and Resource Measure Data were provided and reviewed with: Patient   Patient / Family choosing to utilize agency / facility established prior to hospitalization No     12/5/23: 16:15 Patient evaluated by OT with recommendations for moderate intensity rehab. PT needs to see and evaluate patient for recommendations. Patient is  agreeable to any recommendations. If needs SNF the patient will need preferences for rehab facilities.

## 2023-12-05 NOTE — PROGRESS NOTES
Subjective:    There were no acute overnight events. This morning, the patient stated that her pain was reduced but not gone completely. She denied urinary symptoms or other infectious symptoms.     This afternoon, the patient was hypoglycemic. Confirmed with patient that her home regimen is 35 Lantus and 15 Humalog in the AM and 20 Lantus and 10 Humalog in the PM. Her AM and PM Lantus was held, sliding scale was maintained. We will recheck her glucose and modify the regimen as needed.     HPI: Mariaelena Ruiz is a 83 y.o. female with PMH of DM, L arm pain, and cataracts who presented to the hospital for leg pain. She notes that she began to have new onset pain in her left leg that started on Thursday, 11/30. The pain radiates down her leg but is especially concentrated on the back of her thigh. The pain is sharp and is exacerbated when she stands straight and is reduced when she bends forward. She has never had similar pain in the past. She denies recent or past history of trauma. She notes that her pain goes away when she sits with her knee bent. She took Vicodin, which helped with her pain. She denies any changes in bowel or bladder incontinence, but she does endorse baseline incontinence. She denies weakness of the leg or elsewhere. She denies fevers, chills, and night sweats. She denies recent infections, cuts, or scrapes.    Chief Complaint   Patient presents with   • Leg Pain     Pt presents to the ER with left left leg and left arm pain. Pt states that it began on Thursday. Pt states that the leg pain shoots down her leg and is a 8/10, and that her left forearm to her elbow pain is a 10/10 and comes and goes. Pt has been taking aspirin at home for pain without any relief.         ED course:     Vitals: Temp 37.1, /72 , HR 81 ,RR 21, Spo2 96% on RA    Labs:   - CBC: Pending   - CMP: Pending    Imaging:   - CT Cervical Spine wo IV Contrast: Moderate spondylosis progressed from the prior examination,  and in  particular now with ankylosis of the C3-4 apophyseal joints greater  on the right, and partial ankylosis at the disc interspace. There has  also been progression of neural foraminal impingement as described..  Reversal of the lordotic curvature has also progressed, probably due  to the spondylosis.  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 There is a moderate retrolisthesis at this  level, with moderate-to-marked bilateral neural foraminal  impingement. Additional multilevel spondylosis as described.  Additional findings as above.    Micro:   - UA: Pending    Interventions:     ROS: 12 points review of system is negative except as stated in the HPI above.     Past Medical History   She has a past medical history of Acute cystitis without hematuria (08/19/2019), Body mass index (BMI)40.0-44.9, adult (09/30/2021), Epidermal cyst (01/06/2022), Hyperglycemia, unspecified, Hypertensive chronic kidney disease with stage 1 through stage 4 chronic kidney disease, or unspecified chronic kidney disease (06/17/2021), Incontinence without sensory awareness (06/19/2017), Local infection of the skin and subcutaneous tissue, unspecified (12/19/2018), Morbid (severe) obesity due to excess calories (CMS/HCC) (01/07/2022), Morbid (severe) obesity due to excess calories (CMS/HCC) (06/18/2021), Morbid (severe) obesity due to excess calories (CMS/HCC) (09/30/2021), Other conditions influencing health status (11/17/2013), Other conditions influencing health status (01/09/2015), Other conditions influencing health status (11/17/2013), Other specified symptoms and signs involving the circulatory and respiratory systems (02/03/2020), Pain in left hip (01/22/2014), Personal history of diseases of the skin and subcutaneous tissue, Personal history of other diseases of the circulatory  system, Personal history of other diseases of the musculoskeletal system and connective tissue, Personal history of other diseases of the nervous system and sense organs, Personal history of other diseases of the respiratory system, Personal history of other endocrine, nutritional and metabolic disease, Personal history of other endocrine, nutritional and metabolic disease, Personal history of other endocrine, nutritional and metabolic disease, Personal history of other endocrine, nutritional and metabolic disease, Personal history of other infectious and parasitic diseases, Personal history of other infectious and parasitic diseases, Personal history of other medical treatment, Personal history of other specified conditions (01/17/2018), Personal history of other specified conditions, Personal history of transient ischemic attack (TIA), and cerebral infarction without residual deficits, and Traumatic ischemia of muscle, subsequent encounter (05/02/2020).  Surgical History     Past Surgical History:   Procedure Laterality Date   • BUNIONECTOMY  02/08/2017    Simple Bunion Exostectomy ([facility] Procedure)   • COLONOSCOPY  02/08/2017    Colonoscopy   • CT GUIDED PERCUTANEOUS BIOPSY BONE  7/18/2013    CT GUIDED PERCUTANEOUS BIOPSY BONE 7/18/2013 [facility]ICAL LEGAC[facility]   • CT GUIDED PERCUTANEOUS BIOPSY BONE DEEP  11/15/2013    CT GUIDED PERCUTANEOUS BIOPSY BONE DEEP 11/15/2013 [facility] ANCILLARY LEGACY   • CYSTOSCOPY  02/08/2017    Diagnostic Cystoscopy   • HIP SURGERY  02/08/2017    Hip Surgery   • MR HEAD ANGIO WO IV CONTRAST  8/9/2014    MR HEAD ANGIO WO IV CONTRAST 8/9/2014 [facility] CLINICAL LEGACY   • OTHER SURGICAL HISTORY  08/07/2013    Laminectomy Decompress, Facetectomy, Foraminotomy Cervic Seg   • OTHER SURGICAL HISTORY  02/08/2017    Tunneled (Catheter) Central IV Line Broviac / Arroyo   • OTHER SURGICAL HISTORY  02/08/2017    Remote Analysis Of Technical Componenent Of Implantable Loop Recorder     Family History   No  family history on file.  Social History     Social History     Socioeconomic History   • Marital status:      Spouse name: Not on file   • Number of children: Not on file   • Years of education: Not on file   • Highest education level: Not on file   Occupational History   • Not on file   Tobacco Use   • Smoking status: Never   • Smokeless tobacco: Never   Vaping Use   • Vaping Use: Never used   Substance and Sexual Activity   • Alcohol use: Not on file   • Drug use: Not on file   • Sexual activity: Not on file   Other Topics Concern   • Not on file   Social History Narrative   • Not on file     Social Determinants of Health     Financial Resource Strain: Low Risk  (12/5/2023)    Overall Financial Resource Strain (CARDIA)    • Difficulty of Paying Living Expenses: Not hard at all   Food Insecurity: Not on file   Transportation Needs: No Transportation Needs (12/5/2023)    PRAPARE - Transportation    • Lack of Transportation (Medical): No    • Lack of Transportation (Non-Medical): No   Physical Activity: Not on file   Stress: Not on file   Social Connections: Not on file   Intimate Partner Violence: Not on file   Housing Stability: Low Risk  (12/5/2023)    Housing Stability Vital Sign    • Unable to Pay for Housing in the Last Year: No    • Number of Places Lived in the Last Year: 1    • Unstable Housing in the Last Year: No       Tobacco Use: Low Risk  (7/6/2023)    Patient History    • Smoking Tobacco Use: Never    • Smokeless Tobacco Use: Never    • Passive Exposure: Not on file        Social History     Substance and Sexual Activity   Alcohol Use None      Allergies   No Known Allergies   Meds    Scheduled medications  amLODIPine, 5 mg, oral, Daily  aspirin, 81 mg, oral, Daily  atorvastatin, 20 mg, oral, Daily  cholecalciferol, 2,000 Units, oral, Daily  colestipol, 1 g, oral, Daily  DULoxetine, 60 mg, oral, Daily  gabapentin, 300 mg, oral, BID  heparin (porcine), 5,000 Units, subcutaneous, q8h  insulin  "glargine, 20 Units, subcutaneous, Nightly  insulin glargine, 35 Units, subcutaneous, q AM  insulin lispro, 0-10 Units, subcutaneous, TID with meals  insulin lispro, 10 Units, subcutaneous, TID with meals  losartan, 50 mg, oral, Daily      Continuous medications     PRN medications  PRN medications: acetaminophen, dextrose 10 % in water (D10W), dextrose 10 % in water (D10W), dextrose, dextrose, glucagon, glucagon, HYDROmorphone, oxyCODONE         Objective     Vitals  Visit Vitals  /65   Pulse 81   Temp 36.5 °C (97.7 °F) (Temporal)   Resp 18   Ht 1.575 m (5' 2\")   Wt 98.9 kg (218 lb)   SpO2 96%   BMI 39.87 kg/m²   Smoking Status Never   BSA 2.08 m²        Physical Examination:   Gen: well appearing, in NAD  HEENT: AT/NC, no conjunctival pallor, anicteric sclera, EOMI   Neck: supple, no LAD/JVD  Chest: CTAB, no wheezing / labored respirations  CVS: RRR, no murmurs  Abd: soft, flat, NT/ND, BS+  Ext: no cyanosis/clubbing or pedal edema; Sitting with her left knee bent to reduce the pain  Neuro: AOx3, motor 5/5 globally, sensation intact    I/Os    Intake/Output Summary (Last 24 hours) at 12/5/2023 1335  Last data filed at 12/5/2023 1333  Gross per 24 hour   Intake 720 ml   Output 300 ml   Net 420 ml       Labs:   Results from last 72 hours   Lab Units 12/05/23  0609 12/04/23  1707   SODIUM mmol/L 146* 145   POTASSIUM mmol/L 3.7 4.5   CHLORIDE mmol/L 109* 103   CO2 mmol/L 27 26   BUN mg/dL 27* 28*   CREATININE mg/dL 1.11* 1.08*   GLUCOSE mg/dL 82 139*   CALCIUM mg/dL 8.8 9.2   ANION GAP mmol/L 14 21*   EGFR mL/min/1.73m*2 49* 51*   PHOSPHORUS mg/dL 3.8  --       Results from last 72 hours   Lab Units 12/05/23  0609 12/04/23  1707   WBC AUTO x10*3/uL 11.5* 14.3*   HEMOGLOBIN g/dL 12.3 13.4   HEMATOCRIT % 40.6 43.3   PLATELETS AUTO x10*3/uL 281 272   NEUTROS PCT AUTO %  --  78.8   LYMPHS PCT AUTO %  --  12.9   MONOS PCT AUTO %  --  6.4   EOS PCT AUTO %  --  1.0      Lab Results   Component Value Date    CALCIUM 8.8 " "12/05/2023    PHOS 3.8 12/05/2023      Lab Results   Component Value Date    CRP 0.84 01/06/2022      [unfilled]     Micro/ID:   Susceptibility data from last 90 days.  Collected Specimen Info Organism Amoxicillin/Clavulanate Ampicillin Ampicillin/Sulbactam Cefazolin Cefepime Ciprofloxacin Gentamicin Nitrofurantoin Piperacillin/Tazobactam Trimethoprim/Sulfamethoxazole   10/06/23 Urine from Clean Catch/Voided Citrobacter freundii complex R R R R S S S S S S                    No lab exists for component: \"AGALPCRNB\"   .ID  Lab Results   Component Value Date    URINECULTURE >100,000 Citrobacter freundii complex (A) 10/06/2023     Images    CT lumbar spine wo IV contrast  Narrative: Interpreted By:  Leoncio Barajas,   STUDY:  CT LUMBAR SPINE WO IV CONTRAST  12/4/2023 3:25 pm      INDICATION:  Signs/Symptoms:lower back pain with radiation into left leg      COMPARISON:  CT of the abdomen and pelvis of 03/21/2023      ACCESSION NUMBER(S):  [id number]      ORDERING CLINICIAN:  NAWAF NYE      TECHNIQUE:  Transaxial helical scans with orthogonal reconstructions  .      FINDINGS:  OSSEOUS STRUCTURES:  Deformity of the L4 and L5 vertebral bodies, which is most likely  from moderate remote compression deformities with secondary  ankylosis. There is also marked retropulsion by proximally 1.6 cm  with laminectomy defect and marked right apophyseal bony hypertrophy  which may be from heterotopic bone formation, and probably posterior  bone graft fusion. There is also mild hypertrophy of the left  apophyseal joint with ankylosis, also probably with previous bone  graft fusion. There is also a fairly large Schmorl's node or  interbody disc herniation at the inferior endplate of the L2  vertebral body, and mild compression deformity of the superior  endplate of the L3 vertebral body. These findings overall are similar  to the previous CT. Otherwise no acute bony fractures.      ALIGNMENT:  Within normal limits in the AP " plane without significant scoliosis.      LOWER THORACIC SPINE:  Mild-to-moderate spondylosis with moderate narrowing of the T9-10  disc interspace and vacuum phenomena at the T10-11 and T11-12 disc  interspaces, and with moderate marginal osteophyte formation at these  levels.      T12-L1:  The disc space is maintained. There is mild hypertrophy of the  posterior elements.      L1-2:  Mild narrowing of the disc interspace with mild vacuum phenomena and  mild marginal osteophyte formation. There is mild hypertrophy of the  posterior elements with a mild canal stenosis.      L2-3:  Mild vacuum phenomena of the disc interspace with mild posterior disc  bulge, and with mild anterior marginal osteophyte formation and  anterior disc bulge. There is moderate hypertrophy of the posterior  elements with a moderate canal stenosis, and fairly marked right and  moderate left foraminal impingement.      L3-4:  Mild vacuum phenomena of the disc. Mild anterolisthesis. Moderate  hypertrophy of the apophyseal joints greater on the right, and also  probably with bone graft fusion inferiorly.      L4-5:  Again fairly marked retropulsion from ankylosed compression  deformities of the C4 and C5 vertebral bodies. Again there is  ankylosis of the apophyseal joints particularly on the right with  virtual obliteration of the right neuroforamen, and moderate  impingement of the left neuroforamen. Status post laminectomy.      L5-S1:  Moderate-to-marked narrowing of the disc interspace with moderate  anterior marginal osteophyte formation. There is a moderate  retrolisthesis. There is mild hypertrophy of the apophyseal joints  but with moderate-to-marked bilateral foraminal impingement.      ADDITIONAL FINDINGS:  An approximate 2 cm calculus is present within a right midpole  infundibulum. Fluid attenuation superior to this is probably a 2.5 cm  parapelvic cyst. Moderate atherosclerotic calcifications are noted  predominantly at the aorta  and iliac system. Mild diverticulosis of  the sigmoid colon.      Impression: Remote compression deformities with ankylosis of the L4 and L5  vertebral bodies, with L4-5 laminectomy and apophyseal hypertrophy  and bone graft fusion. There is a moderate retrolisthesis at this  level, with moderate-to-marked bilateral neural foraminal  impingement. Additional multilevel spondylosis as described.  Additional findings as above.      Signed by: Leoncio Barajas 12/4/2023 4:03 PM  Dictation workstation:   [id number]  CT cervical spine wo IV contrast  Narrative: Interpreted By:  Leoncio Barajas,   STUDY:  CT CERVICAL SPINE WO IV CONTRAST;  12/4/2023 3:25 pm      INDICATION:  Left upper arm pain      COMPARISON:  02/10/2015      ACCESSION NUMBER(S):  [id number]      ORDERING CLINICIAN:  NAWAF NYE      TECHNIQUE:  Transaxial images with orthogonal reconstructions      FINDINGS:  VERTEBRAL ALIGNMENT:  Reversal of the lordotic curvature that may be positional, and from  spondylosis. However, spasm is possible.. This has increased from the  prior exam.      CRANIOCERVICAL JUNCTION:  Unremarkable      BONY STRUCTURES:  No fracture or dislocation are evident.      THE CERVICAL LEVELS INCLUDING DISC SPACES, APOPHYSEAL AND  UNCOVERTEBRAL JOINTS:  Moderate spondylosis has progressed since the  previous exam. This includes hypertrophy at the left lateral masses  of the C1-C2 vertebral bodies, and moderate hypertrophy of the left  C2-3 apophyseal joint.. There is a mild anterior subluxation at the  C2-3 level. Marked narrowing of the C3-4 disc interspace, probably  with development of ankylosis anteriorly. There is also been  development of ankylosis of the apophyseal joints greater on the  right. Marked narrowing of the C4-5 disc interspace with a moderate  retrolisthesis and marked anterior marginal osteophyte formation.  Uncovertebral hypertrophy results in fairly marked bilateral  foraminal impingement. Marked narrowing of the  C5-6 disc interspace  with moderate marginal osteophyte formation, and moderate left and  mild right foraminal impingement with uncovertebral hypertrophy.  Marked narrowing of the C6-7 disc interspace with moderate to marked  anterior marginal osteophyte formation and moderate-to-marked  bilateral neural foraminal impingement with uncovertebral  hypertrophy..      ADDITIONAL FINDINGS:  Mild atherosclerotic calcification is noted at the left carotid  bifurcation..      Impression: Moderate spondylosis progressed from the prior examination, and in  particular now with ankylosis of the C3-4 apophyseal joints greater  on the right, and partial ankylosis at the disc interspace. There has  also been progression of neural foraminal impingement as described..  Reversal of the lordotic curvature has also progressed, probably due  to the spondylosis.      Signed by: Leoncio Barajas 12/4/2023 3:44 PM  Dictation workstation:   [id number]    Assessment and Plan    Problem list:  Principal Problem:    Sciatica of left side  Active Problems:    Low back pain      Mariaelena Ruiz is a 83 y.o. female admitted for leg pain.     Acute Medical Issues   #Sciatica, Low Back Pain Secondary to Compression Deformity  -Consider consult to orthopedic surgery/neurosurgery if pain worsens or if neurological weakness develops  -continue home gabapentin and duloxetine for pain   -Consulted PT/OT, appreciate recs  -Pain control; Tylenol scheduled for mild pain, Oxycodone for moderate, and Dilaudid for severe/breakthrough pain    Chronic Medical Issues   #DM  -Adult diet with carb control   -Monitor labs  -Continue home Lantus 35 units in morning, 20 at night-->Hold these for now due to hypoglycemia on 12/5; check glucose level 3 pm today  -Mild SSI    #CKD3a  -Monitor daily renal labs     #HTN  -cont amlodipine and losartan home meds    #HLD  -continue atorvastatin home med      F: PO intake & IVF PRN   E: Replete PRN  N: Adult diet with carb  control  GI ppx: None  DVT ppx: Heparin  Antibiotics:   Tubes/Lines/Drains:     Code Status: Full Code  Emergency Contact: Extended Emergency Contact Information  Primary Emergency Contact: Obey Ruiz  Home Phone: [phone]  Work Phone: [phone]  Relation: Child     Disposition: 83 y.o.female admitted for leg pain. Anticipate LOS < 48 hrs. Observation status.     Tianna Stevenson MD  PGY-1      Disclaimer: Documentation completed with the information available at the time of input. The times in the chart may not be reflective of actual patient care times, interventions, or procedures. Documentation occurs after the physical care of the patient.

## 2023-12-06 ENCOUNTER — APPOINTMENT (OUTPATIENT)
Dept: VASCULAR MEDICINE | Facility: HOSPITAL | Age: 83
End: 2023-12-06
Payer: MEDICARE

## 2023-12-06 LAB
ALBUMIN SERPL BCP-MCNC: 3.2 G/DL (ref 3.4–5)
ALP SERPL-CCNC: 80 U/L (ref 33–136)
ALT SERPL W P-5'-P-CCNC: 10 U/L (ref 7–45)
ANION GAP SERPL CALC-SCNC: 10 MMOL/L (ref 10–20)
AST SERPL W P-5'-P-CCNC: 15 U/L (ref 9–39)
BILIRUB SERPL-MCNC: 0.4 MG/DL (ref 0–1.2)
BUN SERPL-MCNC: 33 MG/DL (ref 6–23)
CALCIUM SERPL-MCNC: 8.4 MG/DL (ref 8.6–10.3)
CHLORIDE SERPL-SCNC: 106 MMOL/L (ref 98–107)
CO2 SERPL-SCNC: 29 MMOL/L (ref 21–32)
CREAT SERPL-MCNC: 1.08 MG/DL (ref 0.5–1.05)
ERYTHROCYTE [DISTWIDTH] IN BLOOD BY AUTOMATED COUNT: 14.3 % (ref 11.5–14.5)
GFR SERPL CREATININE-BSD FRML MDRD: 51 ML/MIN/1.73M*2
GLUCOSE BLD MANUAL STRIP-MCNC: 123 MG/DL (ref 74–99)
GLUCOSE BLD MANUAL STRIP-MCNC: 159 MG/DL (ref 74–99)
GLUCOSE BLD MANUAL STRIP-MCNC: 172 MG/DL (ref 74–99)
GLUCOSE BLD MANUAL STRIP-MCNC: 212 MG/DL (ref 74–99)
GLUCOSE SERPL-MCNC: 134 MG/DL (ref 74–99)
HCT VFR BLD AUTO: 36.4 % (ref 36–46)
HGB BLD-MCNC: 11.1 G/DL (ref 12–16)
MAGNESIUM SERPL-MCNC: 1.83 MG/DL (ref 1.6–2.4)
MCH RBC QN AUTO: 27.3 PG (ref 26–34)
MCHC RBC AUTO-ENTMCNC: 30.5 G/DL (ref 32–36)
MCV RBC AUTO: 89 FL (ref 80–100)
NRBC BLD-RTO: 0 /100 WBCS (ref 0–0)
PHOSPHATE SERPL-MCNC: 4 MG/DL (ref 2.5–4.9)
PLATELET # BLD AUTO: 217 X10*3/UL (ref 150–450)
POTASSIUM SERPL-SCNC: 4 MMOL/L (ref 3.5–5.3)
PROT SERPL-MCNC: 5.8 G/DL (ref 6.4–8.2)
RBC # BLD AUTO: 4.07 X10*6/UL (ref 4–5.2)
SODIUM SERPL-SCNC: 141 MMOL/L (ref 136–145)
WBC # BLD AUTO: 10 X10*3/UL (ref 4.4–11.3)

## 2023-12-06 PROCEDURE — 99232 SBSQ HOSP IP/OBS MODERATE 35: CPT | Performed by: STUDENT IN AN ORGANIZED HEALTH CARE EDUCATION/TRAINING PROGRAM

## 2023-12-06 PROCEDURE — 97116 GAIT TRAINING THERAPY: CPT | Mod: GP

## 2023-12-06 PROCEDURE — 93971 EXTREMITY STUDY: CPT

## 2023-12-06 PROCEDURE — 96372 THER/PROPH/DIAG INJ SC/IM: CPT

## 2023-12-06 PROCEDURE — 97110 THERAPEUTIC EXERCISES: CPT | Mod: GP

## 2023-12-06 PROCEDURE — 82947 ASSAY GLUCOSE BLOOD QUANT: CPT | Mod: 59

## 2023-12-06 PROCEDURE — 36415 COLL VENOUS BLD VENIPUNCTURE: CPT

## 2023-12-06 PROCEDURE — 80053 COMPREHEN METABOLIC PANEL: CPT

## 2023-12-06 PROCEDURE — 84100 ASSAY OF PHOSPHORUS: CPT

## 2023-12-06 PROCEDURE — 93971 EXTREMITY STUDY: CPT | Performed by: SURGERY

## 2023-12-06 PROCEDURE — 2500000001 HC RX 250 WO HCPCS SELF ADMINISTERED DRUGS (ALT 637 FOR MEDICARE OP)

## 2023-12-06 PROCEDURE — 2500000002 HC RX 250 W HCPCS SELF ADMINISTERED DRUGS (ALT 637 FOR MEDICARE OP, ALT 636 FOR OP/ED): Mod: MUE

## 2023-12-06 PROCEDURE — 83735 ASSAY OF MAGNESIUM: CPT

## 2023-12-06 PROCEDURE — 85027 COMPLETE CBC AUTOMATED: CPT

## 2023-12-06 PROCEDURE — 2500000004 HC RX 250 GENERAL PHARMACY W/ HCPCS (ALT 636 FOR OP/ED)

## 2023-12-06 PROCEDURE — G0378 HOSPITAL OBSERVATION PER HR: HCPCS

## 2023-12-06 RX ADMIN — LOSARTAN POTASSIUM 50 MG: 50 TABLET, FILM COATED ORAL at 08:12

## 2023-12-06 RX ADMIN — Medication 2000 UNITS: at 08:12

## 2023-12-06 RX ADMIN — HEPARIN SODIUM 5000 UNITS: 5000 INJECTION INTRAVENOUS; SUBCUTANEOUS at 23:44

## 2023-12-06 RX ADMIN — GABAPENTIN 300 MG: 300 CAPSULE ORAL at 20:21

## 2023-12-06 RX ADMIN — GABAPENTIN 300 MG: 300 CAPSULE ORAL at 08:12

## 2023-12-06 RX ADMIN — INSULIN LISPRO 4 UNITS: 100 INJECTION, SOLUTION INTRAVENOUS; SUBCUTANEOUS at 16:24

## 2023-12-06 RX ADMIN — INSULIN LISPRO 2 UNITS: 100 INJECTION, SOLUTION INTRAVENOUS; SUBCUTANEOUS at 11:43

## 2023-12-06 RX ADMIN — ASPIRIN 81 MG CHEWABLE TABLET 81 MG: 81 TABLET CHEWABLE at 08:13

## 2023-12-06 RX ADMIN — HEPARIN SODIUM 5000 UNITS: 5000 INJECTION INTRAVENOUS; SUBCUTANEOUS at 08:12

## 2023-12-06 RX ADMIN — DULOXETINE HYDROCHLORIDE 60 MG: 60 CAPSULE, DELAYED RELEASE ORAL at 08:12

## 2023-12-06 RX ADMIN — ATORVASTATIN CALCIUM 20 MG: 20 TABLET, FILM COATED ORAL at 08:12

## 2023-12-06 RX ADMIN — HEPARIN SODIUM 5000 UNITS: 5000 INJECTION INTRAVENOUS; SUBCUTANEOUS at 16:24

## 2023-12-06 RX ADMIN — OXYCODONE HYDROCHLORIDE 5 MG: 5 TABLET ORAL at 20:21

## 2023-12-06 RX ADMIN — AMLODIPINE BESYLATE 5 MG: 5 TABLET ORAL at 08:12

## 2023-12-06 ASSESSMENT — PAIN - FUNCTIONAL ASSESSMENT
PAIN_FUNCTIONAL_ASSESSMENT: 0-10
PAIN_FUNCTIONAL_ASSESSMENT: 0-10

## 2023-12-06 ASSESSMENT — COGNITIVE AND FUNCTIONAL STATUS - GENERAL
STANDING UP FROM CHAIR USING ARMS: A LITTLE
MOVING TO AND FROM BED TO CHAIR: A LOT
WALKING IN HOSPITAL ROOM: A LOT
TURNING FROM BACK TO SIDE WHILE IN FLAT BAD: A LOT
DAILY ACTIVITIY SCORE: 16
DRESSING REGULAR UPPER BODY CLOTHING: A LITTLE
HELP NEEDED FOR BATHING: A LOT
TOILETING: A LOT
PERSONAL GROOMING: A LITTLE
MOBILITY SCORE: 17
DAILY ACTIVITIY SCORE: 16
STANDING UP FROM CHAIR USING ARMS: A LOT
DRESSING REGULAR UPPER BODY CLOTHING: A LITTLE
TURNING FROM BACK TO SIDE WHILE IN FLAT BAD: A LITTLE
CLIMB 3 TO 5 STEPS WITH RAILING: A LOT
MOVING TO AND FROM BED TO CHAIR: A LITTLE
TURNING FROM BACK TO SIDE WHILE IN FLAT BAD: A LOT
TOILETING: A LOT
MOBILITY SCORE: 12
DRESSING REGULAR LOWER BODY CLOTHING: A LOT
STANDING UP FROM CHAIR USING ARMS: A LOT
WALKING IN HOSPITAL ROOM: A LITTLE
MOVING FROM LYING ON BACK TO SITTING ON SIDE OF FLAT BED WITH BEDRAILS: A LOT
MOVING FROM LYING ON BACK TO SITTING ON SIDE OF FLAT BED WITH BEDRAILS: A LOT
WALKING IN HOSPITAL ROOM: A LOT
PERSONAL GROOMING: A LITTLE
MOVING FROM LYING ON BACK TO SITTING ON SIDE OF FLAT BED WITH BEDRAILS: A LITTLE
CLIMB 3 TO 5 STEPS WITH RAILING: A LOT
CLIMB 3 TO 5 STEPS WITH RAILING: A LOT
MOBILITY SCORE: 12
DRESSING REGULAR LOWER BODY CLOTHING: A LOT
HELP NEEDED FOR BATHING: A LOT
MOVING TO AND FROM BED TO CHAIR: A LOT

## 2023-12-06 ASSESSMENT — PAIN SCALES - GENERAL
PAINLEVEL_OUTOF10: 6
PAINLEVEL_OUTOF10: 7
PAINLEVEL_OUTOF10: 4

## 2023-12-06 NOTE — PROGRESS NOTES
12/6/2023:  Made aware by CHARO- Mylene Rodrigues that pt preference is [facility].  Referral sent via [facility].  Teresa Watson, CRT, EMT-B, PCN

## 2023-12-06 NOTE — PROGRESS NOTES
Physical Therapy    Physical Therapy Treatment    Patient Name: Mariaelena Ruiz  MRN: [id number]  Today's Date: 12/6/2023  Time Calculation  Start Time: 1406  Stop Time: 1430  Time Calculation (min): 24 min       Assessment/Plan   PT Assessment  PT Assessment Results: Decreased strength, Decreased range of motion, Decreased endurance, Decreased mobility, Obesity, Pain  PT Plan  Inpatient/Swing Bed or Outpatient: Inpatient  PT Plan  Treatment/Interventions: Bed mobility, Transfer training, Gait training, Strengthening, Therapeutic exercise  PT Plan: Skilled PT  PT Frequency: 3 times per week  PT Discharge Recommendations: Moderate intensity level of continued care  Equipment Recommended upon Discharge: Wheeled walker  PT - OK to Discharge:  (progress towards goals)      General Visit Information:   PT  Visit  PT Received On: 12/06/23  Response to Previous Treatment: Patient with no complaints from previous session.  General  Reason for Referral:  (82 yo female admitted 2' to L arm/leg pain, sciatica)  Prior to Session Communication: Bedside nurse  Patient Position Received: Bed, 3 rail up, Alarm on  General Comment:  (pt supine sleeping but, easily wakened. Pt agreeable to work with PT. Pt requires assist with transfers and ambulation 2' to pain in her L hip and posterior thigh.)    Subjective   Precautions:     Vital Signs:       Objective   Pain:  Pain Assessment  Pain Assessment: 0-10  Pain Score: 7  Pain Type: Acute pain  Pain Location: Hip  Pain Orientation: Left  Cognition:  Cognition  Overall Cognitive Status: Within Functional Limits  Postural Control:     Extremity/Trunk Assessments:        RLE   RLE : Within Functional Limits  LLE   LLE : Within Functional Limits  Activity Tolerance:  Activity Tolerance  Endurance: Tolerates 10 - 20 min exercise with multiple rests  Treatments:  Therapeutic Exercise  Therapeutic Exercise Performed: Yes  Therapeutic Exercise Activity 1:  (pt performed the following ex's;  Sitting B heel/toe raises, B hip flexion, B LAQ and B resisted hip abd/add each x 20 reps. Pt performed 6 sit<>stand functional strengthening trials frof EOB to walker. Supine Pt performed 10 left piriformis stretches)    Bed Mobility  Bed Mobility: Yes  Bed Mobility 1  Bed Mobility 1: Supine to sitting, Sitting to supine  Level of Assistance 1: Minimum assistance, Moderate assistance    Ambulation/Gait Training  Ambulation/Gait Training Performed: Yes  Ambulation/Gait Training 1  Surface 1: Level tile  Device 1: Rolling walker  Assistance 1: (S) Minimum assistance, Moderate assistance  Quality of Gait 1: Decreased step length (decreased eli)  Comments/Distance (ft) 1:  (5 side steps up to HOB)  Transfers  Transfer: Yes  Transfer 1  Transfer From 1: Bed to  Transfer to 1: Stand  Technique 1: Sit to stand, Stand to sit  Transfer Device 1:  (wheeled walker)  Transfer Level of Assistance 1: Minimum assistance, Moderate assistance (1 person)    Outcome Measures:  [facility] Basic Mobility  Turning from your back to your side while in a flat bed without using bedrails: A lot  Moving from lying on your back to sitting on the side of a flat bed without using bedrails: A lot  Moving to and from bed to chair (including a wheelchair): A lot  Standing up from a chair using your arms (e.g. wheelchair or bedside chair): A lot  To walk in hospital room: A lot  Climbing 3-5 steps with railing: A lot  Basic Mobility - Total Score: 12    Education Documentation  No documentation found.  Education Comments  No comments found.        OP EDUCATION:       Encounter Problems       Encounter Problems (Active)       Mobility       STG - Patient will ambulate 50-80 feet DAPHNE with wheeled walker (Progressing)       Start:  12/05/23    Expected End:  12/19/23               Transfers       STG - Patient to transfer to and from sit to supine independently  (Progressing)       Start:  12/05/23    Expected End:  12/19/23            STG - Patient  will transfer sit to and from stand MOD I with wheeled walker (Progressing)       Start:  12/05/23    Expected End:  12/19/23

## 2023-12-06 NOTE — PROGRESS NOTES
"  Subjective:    There were no acute overnight events. This morning, the patient stated that her pain was improved. She denied any changes in bowel or bladder symptoms. She noted this morning that when she stood up to use the bathroom, she felt as if her left leg would \"give out.\" On exam, she had normal muscle strength of the left lower extremity. US showed no thrombus in the left upper extremity. She had no hypoglycemia symptoms this morning.     HPI: Mariaelena Ruiz is a 83 y.o. female with PMH of DM, L arm pain, and cataracts who presented to the hospital for leg pain. She notes that she began to have new onset pain in her left leg that started on Thursday, 11/30. The pain radiates down her leg but is especially concentrated on the back of her thigh. The pain is sharp and is exacerbated when she stands straight and is reduced when she bends forward. She has never had similar pain in the past. She denies recent or past history of trauma. She notes that her pain goes away when she sits with her knee bent. She took Vicodin, which helped with her pain. She denies any changes in bowel or bladder incontinence, but she does endorse baseline incontinence. She denies weakness of the leg or elsewhere. She denies fevers, chills, and night sweats. She denies recent infections, cuts, or scrapes.    Chief Complaint   Patient presents with   • Leg Pain     Pt presents to the ER with left left leg and left arm pain. Pt states that it began on Thursday. Pt states that the leg pain shoots down her leg and is a 8/10, and that her left forearm to her elbow pain is a 10/10 and comes and goes. Pt has been taking aspirin at home for pain without any relief.         ED course:     Vitals: Temp 37.1, /72 , HR 81 ,RR 21, Spo2 96% on RA    Labs:   - CBC: Pending   - CMP: Pending    Imaging:   - CT Cervical Spine wo IV Contrast: Moderate spondylosis progressed from the prior examination, and in  particular now with ankylosis of the " C3-4 apophyseal joints greater  on the right, and partial ankylosis at the disc interspace. There has  also been progression of neural foraminal impingement as described..  Reversal of the lordotic curvature has also progressed, probably due  to the spondylosis.  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 There is a moderate retrolisthesis at this  level, with moderate-to-marked bilateral neural foraminal  impingement. Additional multilevel spondylosis as described.  Additional findings as above.    Micro:   - UA: Pending    Interventions:     ROS: 12 points review of system is negative except as stated in the HPI above.     Past Medical History   She has a past medical history of Acute cystitis without hematuria (08/19/2019), Body mass index (BMI)40.0-44.9, adult (09/30/2021), Epidermal cyst (01/06/2022), Hyperglycemia, unspecified, Hypertensive chronic kidney disease with stage 1 through stage 4 chronic kidney disease, or unspecified chronic kidney disease (06/17/2021), Incontinence without sensory awareness (06/19/2017), Local infection of the skin and subcutaneous tissue, unspecified (12/19/2018), Morbid (severe) obesity due to excess calories (CMS/HCC) (01/07/2022), Morbid (severe) obesity due to excess calories (CMS/HCC) (06/18/2021), Morbid (severe) obesity due to excess calories (CMS/HCC) (09/30/2021), Other conditions influencing health status (11/17/2013), Other conditions influencing health status (01/09/2015), Other conditions influencing health status (11/17/2013), Other specified symptoms and signs involving the circulatory and respiratory systems (02/03/2020), Pain in left hip (01/22/2014), Personal history of diseases of the skin and subcutaneous tissue, Personal history of other diseases of the circulatory system, Personal history of other diseases of the  musculoskeletal system and connective tissue, Personal history of other diseases of the nervous system and sense organs, Personal history of other diseases of the respiratory system, Personal history of other endocrine, nutritional and metabolic disease, Personal history of other endocrine, nutritional and metabolic disease, Personal history of other endocrine, nutritional and metabolic disease, Personal history of other endocrine, nutritional and metabolic disease, Personal history of other infectious and parasitic diseases, Personal history of other infectious and parasitic diseases, Personal history of other medical treatment, Personal history of other specified conditions (01/17/2018), Personal history of other specified conditions, Personal history of transient ischemic attack (TIA), and cerebral infarction without residual deficits, and Traumatic ischemia of muscle, subsequent encounter (05/02/2020).  Surgical History     Past Surgical History:   Procedure Laterality Date   • BUNIONECTOMY  02/08/2017    Simple Bunion Exostectomy ([facility])   • COLONOSCOPY  02/08/2017    Colonoscopy   • CT GUIDED PERCUTANEOUS BIOPSY BONE  7/18/2013    CT GUIDED PERCUTANEOUS BIOPSY BONE 7/18/2013 [facility] CLINICAL LEGACY   • CT GUIDED PERCUTANEOUS BIOPSY BONE DEEP  11/15/2013    CT GUIDED PERCUTANEOUS BIOPSY BONE DEEP 11/15/2013 [facility] ANCILLARY LEGACY   • CYSTOSCOPY  02/08/2017    Diagnostic Cystoscopy   • HIP SURGERY  02/08/2017    Hip Surgery   • MR HEAD ANGIO WO IV CONTRAST  8/9/2014    MR HEAD ANGIO WO IV CONTRAST 8/9/2014 [facility] CLINICAL LEGACY   • OTHER SURGICAL HISTORY  08/07/2013    Laminectomy Decompress, Facetectomy, Foraminotomy Cervic Seg   • OTHER SURGICAL HISTORY  02/08/2017    Tunneled (Catheter) Central IV Line Broviac / Arroyo   • OTHER SURGICAL HISTORY  02/08/2017    Remote Analysis Of Technical Componenent Of Implantable Loop Recorder     Family History   No family history on file.  Social History     Social  History     Socioeconomic History   • Marital status:      Spouse name: Not on file   • Number of children: Not on file   • Years of education: Not on file   • Highest education level: Not on file   Occupational History   • Not on file   Tobacco Use   • Smoking status: Never   • Smokeless tobacco: Never   Vaping Use   • Vaping Use: Never used   Substance and Sexual Activity   • Alcohol use: Not on file   • Drug use: Not on file   • Sexual activity: Not on file   Other Topics Concern   • Not on file   Social History Narrative   • Not on file     Social Determinants of Health     Financial Resource Strain: Low Risk  (12/5/2023)    Overall Financial Resource Strain (CARDIA)    • Difficulty of Paying Living Expenses: Not hard at all   Food Insecurity: Not on file   Transportation Needs: No Transportation Needs (12/5/2023)    PRAPARE - Transportation    • Lack of Transportation (Medical): No    • Lack of Transportation (Non-Medical): No   Physical Activity: Not on file   Stress: Not on file   Social Connections: Not on file   Intimate Partner Violence: Not on file   Housing Stability: Low Risk  (12/5/2023)    Housing Stability Vital Sign    • Unable to Pay for Housing in the Last Year: No    • Number of Places Lived in the Last Year: 1    • Unstable Housing in the Last Year: No       Tobacco Use: Low Risk  (7/6/2023)    Patient History    • Smoking Tobacco Use: Never    • Smokeless Tobacco Use: Never    • Passive Exposure: Not on file        Social History     Substance and Sexual Activity   Alcohol Use None      Allergies   No Known Allergies   Meds    Scheduled medications  amLODIPine, 5 mg, oral, Daily  aspirin, 81 mg, oral, Daily  atorvastatin, 20 mg, oral, Daily  cholecalciferol, 2,000 Units, oral, Daily  colestipol, 1 g, oral, Daily  DULoxetine, 60 mg, oral, Daily  gabapentin, 300 mg, oral, BID  heparin (porcine), 5,000 Units, subcutaneous, q8h  [Held by provider] insulin glargine, 20 Units, subcutaneous,  "Nightly  [Held by provider] insulin glargine, 35 Units, subcutaneous, q AM  insulin lispro, 0-10 Units, subcutaneous, TID with meals  [Held by provider] insulin lispro, 10 Units, subcutaneous, TID with meals  losartan, 50 mg, oral, Daily      Continuous medications     PRN medications  PRN medications: acetaminophen, dextrose 10 % in water (D10W), dextrose 10 % in water (D10W), dextrose, dextrose, glucagon, glucagon, HYDROmorphone, oxyCODONE         Objective     Vitals  Visit Vitals  /66   Pulse 77   Temp 37 °C (98.6 °F) (Temporal)   Resp 18   Ht 1.575 m (5' 2\")   Wt 98.9 kg (218 lb)   SpO2 92%   BMI 39.87 kg/m²   Smoking Status Never   BSA 2.08 m²        Physical Examination:   Gen: well appearing, in NAD  HEENT: AT/NC, no conjunctival pallor, anicteric sclera, EOMI   Neck: supple, no LAD/JVD  Chest: CTAB, no wheezing / labored respirations  CVS: RRR, no murmurs  Abd: soft, flat, NT/ND, BS+  Ext: no cyanosis/clubbing or pedal edema; normal muscle strength on exam of left lower extremity   Neuro: AOx3, motor 5/5 globally, sensation intact    I/Os    Intake/Output Summary (Last 24 hours) at 12/6/2023 1305  Last data filed at 12/6/2023 0923  Gross per 24 hour   Intake 480 ml   Output --   Net 480 ml       Labs:   Results from last 72 hours   Lab Units 12/06/23  0630 12/05/23  0609 12/04/23  1707   SODIUM mmol/L 141 146* 145   POTASSIUM mmol/L 4.0 3.7 4.5   CHLORIDE mmol/L 106 109* 103   CO2 mmol/L 29 27 26   BUN mg/dL 33* 27* 28*   CREATININE mg/dL 1.08* 1.11* 1.08*   GLUCOSE mg/dL 134* 82 139*   CALCIUM mg/dL 8.4* 8.8 9.2   ANION GAP mmol/L 10 14 21*   EGFR mL/min/1.73m*2 51* 49* 51*   PHOSPHORUS mg/dL 4.0 3.8  --       Results from last 72 hours   Lab Units 12/06/23  0630 12/05/23  0609 12/04/23  1707   WBC AUTO x10*3/uL 10.0 11.5* 14.3*   HEMOGLOBIN g/dL 11.1* 12.3 13.4   HEMATOCRIT % 36.4 40.6 43.3   PLATELETS AUTO x10*3/uL 217 281 272   NEUTROS PCT AUTO %  --   --  78.8   LYMPHS PCT AUTO %  --   --  12.9 " "  MONOS PCT AUTO %  --   --  6.4   EOS PCT AUTO %  --   --  1.0      Lab Results   Component Value Date    CALCIUM 8.4 (L) 12/06/2023    PHOS 4.0 12/06/2023      Lab Results   Component Value Date    CRP 0.84 01/06/2022      [unfilled]     Micro/ID:   Susceptibility data from last 90 days.  Collected Specimen Info Organism Amoxicillin/Clavulanate Ampicillin Ampicillin/Sulbactam Cefazolin Cefepime Ciprofloxacin Gentamicin Nitrofurantoin Piperacillin/Tazobactam Trimethoprim/Sulfamethoxazole   10/06/23 Urine from Clean Catch/Voided Citrobacter freundii complex R R R R S S S S S S                    No lab exists for component: \"AGALPCRNB\"   .ID  Lab Results   Component Value Date    URINECULTURE >100,000 Citrobacter freundii complex (A) 10/06/2023     Images    Vascular US Upper Extremity Venous Duplex Left  Preliminary Cardiology Report            [place] [id number]   Tel [phone] and Fax [phone]            Preliminary Vascular Lab Report     [facility] US UPPER EXTREMITY VENOUS DUPLEX LEFT       Patient Name:      JD AYALA Reading Physician:  [id number] Dar Garcia MD  Study Date:        12/6/2023         Ordering Physician: [id number] ANGELA CORDERO  MRN/PID:           [id number]          Technologist:       Shanda Gallegos S  Accession#:        [id number]      Technologist 2:     Maria L Trammell  Date of Birth/Age: 1940        Encounter#:         [id number]  Gender:            F  Admission Status:  Inpatient         Location Performed: [facility]       Diagnosis/ICD: Pain in left arm-M79.602  Procedure/CPT: 94883 Peripheral venous duplex scan for DVT Limited       PRELIMINARY CONCLUSIONS:  Right Upper Venous: The subclavian vein demonstrates a normal spontaneous and phasic flow.  Left Upper Venous: No evidence of acute deep vein thrombus visualized in the left upper extremity.     Imaging & Doppler Findings:     Right             " Flow  Subclavian Spontaneous/Phasic       Left                Compress Thrombus        Flow  Internal Jugular      Yes      None   Spontaneous/Phasic  Subclavian Proximal   Yes      None   Spontaneous/Phasic  Subclavian Mid        Yes      None   Spontaneous/Phasic  Subclavian Distal     Yes      None   Spontaneous/Phasic  Axillary              Yes      None   Spontaneous/Phasic  Brachial              Yes      None   Spontaneous/Phasic  Cephalic              Yes      None  Basilic               Yes      None    VASCULAR PRELIMINARY REPORT  completed by Shanda Gallegos RVS on 12/6/2023 at 8:56:03 AM       ** Final **    Assessment and Plan    Problem list:  Principal Problem:    Sciatica of left side  Active Problems:    Low back pain      Mariaelena Ruiz is a 83 y.o. female admitted for leg pain.     Acute Medical Issues   #Sciatica, Low Back Pain Secondary to Compression Deformity  -Consider consult to orthopedic surgery/neurosurgery if pain worsens or if neurological weakness develops  -continue home gabapentin and duloxetine for pain   -Consulted PT/OT, appreciate recs  -Pain control; Tylenol scheduled for mild pain, Oxycodone for moderate, and Dilaudid for severe/breakthrough pain    Chronic Medical Issues   #DM  -Adult diet with carb control   -Monitor labs  -Continue home Lantus 35 units in morning, 20 at night-->Hold these for now due to hypoglycemia on 12/5; check glucose level 3 pm today  -Mild SSI    #CKD3a  -Monitor daily renal labs     #HTN  -cont amlodipine and losartan home meds    #HLD  -continue atorvastatin home med    #Left Arm Pain  -US showed no thrombus       F: PO intake & IVF PRN   E: Replete PRN  N: Adult diet with carb control  GI ppx: None  DVT ppx: Heparin  Antibiotics:   Tubes/Lines/Drains:     Code Status: Full Code  Emergency Contact: Extended Emergency Contact Information  Primary Emergency Contact: Obey Ruiz  Home Phone: [phone]  Work Phone: [phone]  Relation: Child      Disposition: 83 y.o.female admitted for leg pain. Pending placement at SNF. Anticipate LOS < 48 hrs. Observation status.     Tianna Stevenson MD  PGY-1      Disclaimer: Documentation completed with the information available at the time of input. The times in the chart may not be reflective of actual patient care times, interventions, or procedures. Documentation occurs after the physical care of the patient.

## 2023-12-06 NOTE — PROGRESS NOTES
12/06/23 0948   Discharge Planning   Living Arrangements Alone   Support Systems Children   Assistance Needed A&Ox3, independent at baseline with a rollator or walker; has a shower chair, drives   Type of Residence Private residence   Home or Post Acute Services Post acute facilities (Rehab/SNF/etc)   Type of Post Acute Facility Services Skilled nursing   Patient expects to be discharged to: new SNF   Does the patient need discharge transport arranged? Yes   RoundTrip coordination needed? Yes   Has discharge transport been arranged? No   Patient Choice   Provider Choice list and CMS website (https://medicare.gov/care-compare#search) for post-acute Quality and Resource Measure Data were provided and reviewed with: Patient     12/06/2023 0949: PT/OT evaluated the patient, [facility] 16/17, recommending moderate intensity. Discussed with the patient at the bedside. Patient is agreeable to SNF, list provided. TCC to follow up for choices.     12/06/2023 1550: Patient still unsure if she wants to go to SNF or home with [facility]. Patient called [facility] to discuss with them what therapy they offer. Patient is agreeable for referral to be sent to [facility] to see if bed available and precert to be started.     12/07/2023 1200: Referrals sent to multiple SNFs and all are unable to accept patient due to bed availability or lack of contract with insurance. Discussed with patient and she has no other preferences and would like to return home with new [facility]. Made MD aware to send referral.

## 2023-12-07 ENCOUNTER — HOME HEALTH ADMISSION (OUTPATIENT)
Dept: HOME HEALTH SERVICES | Facility: HOME HEALTH | Age: 83
End: 2023-12-07
Payer: MEDICARE

## 2023-12-07 ENCOUNTER — PHARMACY VISIT (OUTPATIENT)
Dept: PHARMACY | Facility: CLINIC | Age: 83
End: 2023-12-07
Payer: MEDICARE

## 2023-12-07 VITALS
RESPIRATION RATE: 16 BRPM | OXYGEN SATURATION: 92 % | BODY MASS INDEX: 40.12 KG/M2 | HEART RATE: 76 BPM | HEIGHT: 62 IN | WEIGHT: 218 LBS | DIASTOLIC BLOOD PRESSURE: 74 MMHG | TEMPERATURE: 97.3 F | SYSTOLIC BLOOD PRESSURE: 122 MMHG

## 2023-12-07 LAB
ALBUMIN SERPL BCP-MCNC: 3.3 G/DL (ref 3.4–5)
ALP SERPL-CCNC: 86 U/L (ref 33–136)
ALT SERPL W P-5'-P-CCNC: 12 U/L (ref 7–45)
ANION GAP SERPL CALC-SCNC: 11 MMOL/L (ref 10–20)
AST SERPL W P-5'-P-CCNC: 15 U/L (ref 9–39)
BILIRUB SERPL-MCNC: 0.6 MG/DL (ref 0–1.2)
BUN SERPL-MCNC: 30 MG/DL (ref 6–23)
CALCIUM SERPL-MCNC: 8.6 MG/DL (ref 8.6–10.3)
CHLORIDE SERPL-SCNC: 106 MMOL/L (ref 98–107)
CO2 SERPL-SCNC: 29 MMOL/L (ref 21–32)
CREAT SERPL-MCNC: 1 MG/DL (ref 0.5–1.05)
ERYTHROCYTE [DISTWIDTH] IN BLOOD BY AUTOMATED COUNT: 14.4 % (ref 11.5–14.5)
GFR SERPL CREATININE-BSD FRML MDRD: 56 ML/MIN/1.73M*2
GLUCOSE BLD MANUAL STRIP-MCNC: 146 MG/DL (ref 74–99)
GLUCOSE BLD MANUAL STRIP-MCNC: 185 MG/DL (ref 74–99)
GLUCOSE BLD MANUAL STRIP-MCNC: 234 MG/DL (ref 74–99)
GLUCOSE SERPL-MCNC: 157 MG/DL (ref 74–99)
HCT VFR BLD AUTO: 38.9 % (ref 36–46)
HGB BLD-MCNC: 11.6 G/DL (ref 12–16)
MAGNESIUM SERPL-MCNC: 1.89 MG/DL (ref 1.6–2.4)
MCH RBC QN AUTO: 26.9 PG (ref 26–34)
MCHC RBC AUTO-ENTMCNC: 29.8 G/DL (ref 32–36)
MCV RBC AUTO: 90 FL (ref 80–100)
NRBC BLD-RTO: 0 /100 WBCS (ref 0–0)
PHOSPHATE SERPL-MCNC: 3.7 MG/DL (ref 2.5–4.9)
PLATELET # BLD AUTO: 221 X10*3/UL (ref 150–450)
POTASSIUM SERPL-SCNC: 3.8 MMOL/L (ref 3.5–5.3)
PROT SERPL-MCNC: 6.2 G/DL (ref 6.4–8.2)
RBC # BLD AUTO: 4.31 X10*6/UL (ref 4–5.2)
SODIUM SERPL-SCNC: 142 MMOL/L (ref 136–145)
WBC # BLD AUTO: 8.8 X10*3/UL (ref 4.4–11.3)

## 2023-12-07 PROCEDURE — 2500000001 HC RX 250 WO HCPCS SELF ADMINISTERED DRUGS (ALT 637 FOR MEDICARE OP)

## 2023-12-07 PROCEDURE — 36415 COLL VENOUS BLD VENIPUNCTURE: CPT

## 2023-12-07 PROCEDURE — 97110 THERAPEUTIC EXERCISES: CPT | Mod: GP

## 2023-12-07 PROCEDURE — 80053 COMPREHEN METABOLIC PANEL: CPT

## 2023-12-07 PROCEDURE — 99239 HOSP IP/OBS DSCHRG MGMT >30: CPT | Performed by: STUDENT IN AN ORGANIZED HEALTH CARE EDUCATION/TRAINING PROGRAM

## 2023-12-07 PROCEDURE — 97530 THERAPEUTIC ACTIVITIES: CPT | Mod: GO

## 2023-12-07 PROCEDURE — 82947 ASSAY GLUCOSE BLOOD QUANT: CPT | Mod: 59

## 2023-12-07 PROCEDURE — 85027 COMPLETE CBC AUTOMATED: CPT

## 2023-12-07 PROCEDURE — 2500000004 HC RX 250 GENERAL PHARMACY W/ HCPCS (ALT 636 FOR OP/ED)

## 2023-12-07 PROCEDURE — 83735 ASSAY OF MAGNESIUM: CPT

## 2023-12-07 PROCEDURE — 2500000002 HC RX 250 W HCPCS SELF ADMINISTERED DRUGS (ALT 637 FOR MEDICARE OP, ALT 636 FOR OP/ED)

## 2023-12-07 PROCEDURE — 84100 ASSAY OF PHOSPHORUS: CPT

## 2023-12-07 PROCEDURE — 96372 THER/PROPH/DIAG INJ SC/IM: CPT

## 2023-12-07 PROCEDURE — G0378 HOSPITAL OBSERVATION PER HR: HCPCS

## 2023-12-07 PROCEDURE — RXMED WILLOW AMBULATORY MEDICATION CHARGE

## 2023-12-07 PROCEDURE — 97116 GAIT TRAINING THERAPY: CPT | Mod: GP

## 2023-12-07 RX ORDER — CHOLECALCIFEROL (VITAMIN D3) 50 MCG
2000 TABLET ORAL DAILY
Qty: 30 TABLET | Refills: 1 | Status: SHIPPED | OUTPATIENT
Start: 2023-12-08 | End: 2024-03-19 | Stop reason: WASHOUT

## 2023-12-07 RX ORDER — NAPROXEN SODIUM 220 MG/1
81 TABLET, FILM COATED ORAL DAILY
Qty: 30 TABLET | Refills: 1 | Status: SHIPPED | OUTPATIENT
Start: 2023-12-08

## 2023-12-07 RX ORDER — POLYETHYLENE GLYCOL 3350 17 G/17G
17 POWDER, FOR SOLUTION ORAL DAILY
Status: DISCONTINUED | OUTPATIENT
Start: 2023-12-07 | End: 2023-12-07 | Stop reason: HOSPADM

## 2023-12-07 RX ORDER — POLYETHYLENE GLYCOL 3350 17 G/17G
17 POWDER, FOR SOLUTION ORAL DAILY
Qty: 238 G | Refills: 0 | Status: SHIPPED | OUTPATIENT
Start: 2023-12-08 | End: 2024-02-15 | Stop reason: ALTCHOICE

## 2023-12-07 RX ORDER — OXYCODONE AND ACETAMINOPHEN 5; 325 MG/1; MG/1
1 TABLET ORAL EVERY 6 HOURS PRN
Qty: 12 TABLET | Refills: 0 | OUTPATIENT
Start: 2023-12-07 | End: 2023-12-10

## 2023-12-07 RX ORDER — OXYCODONE AND ACETAMINOPHEN 5; 325 MG/1; MG/1
1 TABLET ORAL EVERY 6 HOURS PRN
Qty: 12 TABLET | Refills: 0 | Status: SHIPPED | OUTPATIENT
Start: 2023-12-07 | End: 2023-12-10

## 2023-12-07 RX ORDER — ACETAMINOPHEN 325 MG/1
650 TABLET ORAL EVERY 6 HOURS PRN
Qty: 30 TABLET | Refills: 0 | Status: SHIPPED | OUTPATIENT
Start: 2023-12-07

## 2023-12-07 RX ADMIN — OXYCODONE HYDROCHLORIDE 5 MG: 5 TABLET ORAL at 08:12

## 2023-12-07 RX ADMIN — LOSARTAN POTASSIUM 50 MG: 50 TABLET, FILM COATED ORAL at 08:13

## 2023-12-07 RX ADMIN — ASPIRIN 81 MG CHEWABLE TABLET 81 MG: 81 TABLET CHEWABLE at 08:13

## 2023-12-07 RX ADMIN — DULOXETINE HYDROCHLORIDE 60 MG: 60 CAPSULE, DELAYED RELEASE ORAL at 08:13

## 2023-12-07 RX ADMIN — HEPARIN SODIUM 5000 UNITS: 5000 INJECTION INTRAVENOUS; SUBCUTANEOUS at 08:13

## 2023-12-07 RX ADMIN — INSULIN LISPRO 2 UNITS: 100 INJECTION, SOLUTION INTRAVENOUS; SUBCUTANEOUS at 16:27

## 2023-12-07 RX ADMIN — GABAPENTIN 300 MG: 300 CAPSULE ORAL at 08:13

## 2023-12-07 RX ADMIN — AMLODIPINE BESYLATE 5 MG: 5 TABLET ORAL at 08:13

## 2023-12-07 RX ADMIN — POLYETHYLENE GLYCOL 3350 17 G: 17 POWDER, FOR SOLUTION ORAL at 11:52

## 2023-12-07 RX ADMIN — ATORVASTATIN CALCIUM 20 MG: 20 TABLET, FILM COATED ORAL at 08:13

## 2023-12-07 RX ADMIN — Medication 2000 UNITS: at 08:13

## 2023-12-07 RX ADMIN — INSULIN LISPRO 4 UNITS: 100 INJECTION, SOLUTION INTRAVENOUS; SUBCUTANEOUS at 11:51

## 2023-12-07 ASSESSMENT — COGNITIVE AND FUNCTIONAL STATUS - GENERAL
DAILY ACTIVITIY SCORE: 16
DRESSING REGULAR UPPER BODY CLOTHING: A LITTLE
TOILETING: A LOT
TURNING FROM BACK TO SIDE WHILE IN FLAT BAD: A LOT
MOVING FROM LYING ON BACK TO SITTING ON SIDE OF FLAT BED WITH BEDRAILS: A LITTLE
MOBILITY SCORE: 14
PERSONAL GROOMING: A LITTLE
MOVING TO AND FROM BED TO CHAIR: A LOT
DRESSING REGULAR UPPER BODY CLOTHING: A LITTLE
TURNING FROM BACK TO SIDE WHILE IN FLAT BAD: A LITTLE
WALKING IN HOSPITAL ROOM: A LOT
DRESSING REGULAR LOWER BODY CLOTHING: A LOT
DAILY ACTIVITIY SCORE: 16
HELP NEEDED FOR BATHING: A LOT
PERSONAL GROOMING: A LITTLE
CLIMB 3 TO 5 STEPS WITH RAILING: A LOT
STANDING UP FROM CHAIR USING ARMS: A LOT
STANDING UP FROM CHAIR USING ARMS: A LOT
MOVING FROM LYING ON BACK TO SITTING ON SIDE OF FLAT BED WITH BEDRAILS: A LOT
MOBILITY SCORE: 12
MOVING TO AND FROM BED TO CHAIR: A LOT
HELP NEEDED FOR BATHING: A LOT
DRESSING REGULAR LOWER BODY CLOTHING: A LOT
WALKING IN HOSPITAL ROOM: A LOT
TOILETING: A LOT
CLIMB 3 TO 5 STEPS WITH RAILING: A LOT

## 2023-12-07 ASSESSMENT — PAIN - FUNCTIONAL ASSESSMENT
PAIN_FUNCTIONAL_ASSESSMENT: 0-10
PAIN_FUNCTIONAL_ASSESSMENT: 0-10

## 2023-12-07 ASSESSMENT — PAIN SCALES - GENERAL
PAINLEVEL_OUTOF10: 4
PAINLEVEL_OUTOF10: 3
PAINLEVEL_OUTOF10: 6
PAINLEVEL_OUTOF10: 7

## 2023-12-07 ASSESSMENT — PAIN DESCRIPTION - LOCATION: LOCATION: ARM

## 2023-12-07 NOTE — PROGRESS NOTES
Subjective:    There were no acute overnight events. This morning, the patient stated that her pain was improved. She denied weakness of the left lower extremity and has been able to ambulate to the bathroom with her walker. She had no hypoglycemia symptoms this morning. She has no other complaints this morning.     HPI: Mariaelena Ruiz is a 83 y.o. female with PMH of DM, L arm pain, and cataracts who presented to the hospital for leg pain. She notes that she began to have new onset pain in her left leg that started on Thursday, 11/30. The pain radiates down her leg but is especially concentrated on the back of her thigh. The pain is sharp and is exacerbated when she stands straight and is reduced when she bends forward. She has never had similar pain in the past. She denies recent or past history of trauma. She notes that her pain goes away when she sits with her knee bent. She took Vicodin, which helped with her pain. She denies any changes in bowel or bladder incontinence, but she does endorse baseline incontinence. She denies weakness of the leg or elsewhere. She denies fevers, chills, and night sweats. She denies recent infections, cuts, or scrapes.    Chief Complaint   Patient presents with    Leg Pain     Pt presents to the ER with left left leg and left arm pain. Pt states that it began on Thursday. Pt states that the leg pain shoots down her leg and is a 8/10, and that her left forearm to her elbow pain is a 10/10 and comes and goes. Pt has been taking aspirin at home for pain without any relief.         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 There has  also been progression of  neural foraminal impingement as described..  Reversal of the lordotic curvature has also progressed, probably due  to the spondylosis.  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 There is a moderate retrolisthesis at this  level, with moderate-to-marked bilateral neural foraminal  impingement. Additional multilevel spondylosis as described.  Additional findings as above.    Micro:   - UA: Pending    Interventions:     ROS: 12 points review of system is negative except as stated in the HPI above.     Past Medical History   She has a past medical history of Acute cystitis without hematuria (08/19/2019), Body mass index (BMI)40.0-44.9, adult (09/30/2021), Epidermal cyst (01/06/2022), Hyperglycemia, unspecified, Hypertensive chronic kidney disease with stage 1 through stage 4 chronic kidney disease, or unspecified chronic kidney disease (06/17/2021), Incontinence without sensory awareness (06/19/2017), Local infection of the skin and subcutaneous tissue, unspecified (12/19/2018), Morbid (severe) obesity due to excess calories (CMS/HCC) (01/07/2022), Morbid (severe) obesity due to excess calories (CMS/HCC) (06/18/2021), Morbid (severe) obesity due to excess calories (CMS/HCC) (09/30/2021), Other conditions influencing health status (11/17/2013), Other conditions influencing health status (01/09/2015), Other conditions influencing health status (11/17/2013), Other specified symptoms and signs involving the circulatory and respiratory systems (02/03/2020), Pain in left hip (01/22/2014), Personal history of diseases of the skin and subcutaneous tissue, Personal history of other diseases of the circulatory system, Personal history of other diseases of the musculoskeletal system and connective tissue, Personal history of other diseases of the nervous system and sense organs, Personal  history of other diseases of the respiratory system, Personal history of other endocrine, nutritional and metabolic disease, Personal history of other endocrine, nutritional and metabolic disease, Personal history of other endocrine, nutritional and metabolic disease, Personal history of other endocrine, nutritional and metabolic disease, Personal history of other infectious and parasitic diseases, Personal history of other infectious and parasitic diseases, Personal history of other medical treatment, Personal history of other specified conditions (01/17/2018), Personal history of other specified conditions, Personal history of transient ischemic attack (TIA), and cerebral infarction without residual deficits, and Traumatic ischemia of muscle, subsequent encounter (05/02/2020).  Surgical History     Past Surgical History:   Procedure Laterality Date    BUNIONECTOMY  02/08/2017    Simple Bunion Exostectomy ([facility] Procedure)    COLONOSCOPY  02/08/2017    Colonoscopy    CT GUIDED PERCUTANEOUS BIOPSY BONE  7/18/2013    CT GUIDED PERCUTANEOUS BIOPSY BONE 7/18/2013 [facility] CLINICAL LEGACY    CT GUIDED PERCUTANEOUS BIOPSY BONE DEEP  11/15/2013    CT GUIDED PERCUTANEOUS BIOPSY BONE DEEP 11/15/2013 [facility]    CYSTOSCOPY  02/08/2017    Diagnostic Cystoscopy    HIP SURGERY  02/08/2017    Hip Surgery    MR HEAD ANGIO WO IV CONTRAST  8/9/2014    MR HEAD ANGIO WO IV CONTRAST 8/9/2014 [facility] CLINICAL LEGACY    OTHER SURGICAL HISTORY  08/07/2013    Laminectomy Decompress, Facetectomy, Foraminotomy Cervic Seg    OTHER SURGICAL HISTORY  02/08/2017    Tunneled (Catheter) Central IV Line Broviac / Arroyo    OTHER SURGICAL HISTORY  02/08/2017    Remote Analysis Of Technical Componenent Of Implantable Loop Recorder     Family History   No family history on file.  Social History     Social History     Socioeconomic History    Marital status:      Spouse name: Not on file    Number of children: Not on file    Years of  education: Not on file    Highest education level: Not on file   Occupational History    Not on file   Tobacco Use    Smoking status: Never    Smokeless tobacco: Never   Vaping Use    Vaping Use: Never used   Substance and Sexual Activity    Alcohol use: Not on file    Drug use: Not on file    Sexual activity: Not on file   Other Topics Concern    Not on file   Social History Narrative    Not on file     Social Determinants of Health     Financial Resource Strain: Low Risk  (12/5/2023)    Overall Financial Resource Strain (CARDIA)     Difficulty of Paying Living Expenses: Not hard at all   Food Insecurity: Not on file   Transportation Needs: No Transportation Needs (12/5/2023)    PRAPARE - Transportation     Lack of Transportation (Medical): No     Lack of Transportation (Non-Medical): No   Physical Activity: Not on file   Stress: Not on file   Social Connections: Not on file   Intimate Partner Violence: Not on file   Housing Stability: Low Risk  (12/5/2023)    Housing Stability Vital Sign     Unable to Pay for Housing in the Last Year: No     Number of Places Lived in the Last Year: 1     Unstable Housing in the Last Year: No       Tobacco Use: Low Risk  (7/6/2023)    Patient History     Smoking Tobacco Use: Never     Smokeless Tobacco Use: Never     Passive Exposure: Not on file        Social History     Substance and Sexual Activity   Alcohol Use None      Allergies   No Known Allergies   Meds    Scheduled medications  amLODIPine, 5 mg, oral, Daily  aspirin, 81 mg, oral, Daily  atorvastatin, 20 mg, oral, Daily  cholecalciferol, 2,000 Units, oral, Daily  DULoxetine, 60 mg, oral, Daily  gabapentin, 300 mg, oral, BID  heparin (porcine), 5,000 Units, subcutaneous, q8h  [Held by provider] insulin glargine, 20 Units, subcutaneous, Nightly  [Held by provider] insulin glargine, 35 Units, subcutaneous, q AM  insulin lispro, 0-10 Units, subcutaneous, TID with meals  [Held by provider] insulin lispro, 10 Units,  "subcutaneous, TID with meals  losartan, 50 mg, oral, Daily  polyethylene glycol, 17 g, oral, Daily      Continuous medications     PRN medications  PRN medications: acetaminophen, dextrose 10 % in water (D10W), dextrose 10 % in water (D10W), dextrose, dextrose, glucagon, glucagon, HYDROmorphone, oxyCODONE         Objective     Vitals  Visit Vitals  /81   Pulse 78   Temp 36.4 °C (97.5 °F)   Resp 18   Ht 1.575 m (5' 2\")   Wt 98.9 kg (218 lb)   SpO2 93% Comment: room air   BMI 39.87 kg/m²   Smoking Status Never   BSA 2.08 m²        Physical Examination:   Gen: well appearing, in NAD  HEENT: AT/NC, no conjunctival pallor, anicteric sclera, EOMI   Neck: supple, no LAD/JVD  Chest: CTAB, no wheezing / labored respirations  CVS: RRR, no murmurs  Abd: soft, flat, NT/ND, BS+  Ext: no cyanosis/clubbing or pedal edema; normal muscle strength on exam of left lower extremity   Neuro: AOx3, motor 5/5 globally, sensation intact    I/Os    Intake/Output Summary (Last 24 hours) at 12/7/2023 1111  Last data filed at 12/6/2023 1700  Gross per 24 hour   Intake 360 ml   Output --   Net 360 ml       Labs:   Results from last 72 hours   Lab Units 12/07/23  0618 12/06/23  0630 12/05/23  0609   SODIUM mmol/L 142 141 146*   POTASSIUM mmol/L 3.8 4.0 3.7   CHLORIDE mmol/L 106 106 109*   CO2 mmol/L 29 29 27   BUN mg/dL 30* 33* 27*   CREATININE mg/dL 1.00 1.08* 1.11*   GLUCOSE mg/dL 157* 134* 82   CALCIUM mg/dL 8.6 8.4* 8.8   ANION GAP mmol/L 11 10 14   EGFR mL/min/1.73m*2 56* 51* 49*   PHOSPHORUS mg/dL 3.7 4.0 3.8      Results from last 72 hours   Lab Units 12/07/23  0618 12/06/23  0630 12/05/23  0609 12/04/23  1707   WBC AUTO x10*3/uL 8.8 10.0 11.5* 14.3*   HEMOGLOBIN g/dL 11.6* 11.1* 12.3 13.4   HEMATOCRIT % 38.9 36.4 40.6 43.3   PLATELETS AUTO x10*3/uL 221 217 281 272   NEUTROS PCT AUTO %  --   --   --  78.8   LYMPHS PCT AUTO %  --   --   --  12.9   MONOS PCT AUTO %  --   --   --  6.4   EOS PCT AUTO %  --   --   --  1.0      Lab Results " "  Component Value Date    CALCIUM 8.6 12/07/2023    PHOS 3.7 12/07/2023      Lab Results   Component Value Date    CRP 0.84 01/06/2022      [unfilled]     Micro/ID:   Susceptibility data from last 90 days.  Collected Specimen Info Organism Amoxicillin/Clavulanate Ampicillin Ampicillin/Sulbactam Cefazolin Cefepime Ciprofloxacin Gentamicin Nitrofurantoin Piperacillin/Tazobactam Trimethoprim/Sulfamethoxazole   10/06/23 Urine from Clean Catch/Voided Citrobacter freundii complex R R R R S S S S S S                    No lab exists for component: \"AGALPCRNB\"   .ID  Lab Results   Component Value Date    URINECULTURE >100,000 Citrobacter freundii complex (A) 10/06/2023     Images    Vascular US Upper Extremity Venous Duplex Left  Preliminary Cardiology Report            [place] [address]   Tel [phone] and Fax [phone]            Preliminary Vascular Lab Report     VASC US UPPER EXTREMITY VENOUS DUPLEX LEFT       Patient Name:      JD DANICA AYALA Reading Physician:  [id number] Dar Garcia MD  Study Date:        12/6/2023         Ordering Physician: [id number] ANGELA CORDERO  MRN/PID:           [id number]          Technologist:       Shanda Gallegos [facility]  Accession#:        [id number]      Technologist 2:     Maria L Trammell  Date of Birth/Age: 1940        Encounter#:         [id number]  Gender:            F  Admission Status:  Inpatient         Location Performed: [facility]       Diagnosis/ICD: Pain in left arm-M79.602  Procedure/CPT: 58615 Peripheral venous duplex scan for DVT Limited       PRELIMINARY CONCLUSIONS:  Right Upper Venous: The subclavian vein demonstrates a normal spontaneous and phasic flow.  Left Upper Venous: No evidence of acute deep vein thrombus visualized in the left upper extremity.     Imaging & Doppler Findings:     Right             Flow  Subclavian Spontaneous/Phasic       Left                Compress Thrombus        " Flow  Internal Jugular      Yes      None   Spontaneous/Phasic  Subclavian Proximal   Yes      None   Spontaneous/Phasic  Subclavian Mid        Yes      None   Spontaneous/Phasic  Subclavian Distal     Yes      None   Spontaneous/Phasic  Axillary              Yes      None   Spontaneous/Phasic  Brachial              Yes      None   Spontaneous/Phasic  Cephalic              Yes      None  Basilic               Yes      None    VASCULAR PRELIMINARY REPORT  completed by Shanda SCALES on 12/6/2023 at 8:56:03 AM       ** Final **    Assessment and Plan    Problem list:  Principal Problem:    Sciatica of left side  Active Problems:    Low back pain      Mariaelena Ruiz is a 83 y.o. female admitted for leg pain.     Acute Medical Issues   #Sciatica, Low Back Pain Secondary to Compression Deformity  -Consider consult to orthopedic surgery/neurosurgery if pain worsens or if neurological weakness develops  -continue home gabapentin and duloxetine for pain   -Consulted PT/OT, appreciate recs  -Pain control; Tylenol scheduled for mild pain, Oxycodone for moderate, and Dilaudid for severe/breakthrough pain  -Added constipation prevention, Miralax daily    Chronic Medical Issues   #DM  -Adult diet with carb control   -Monitor labs  -Continue home Lantus 35 units in morning, 20 at night-->Hold these for now due to hypoglycemia on 12/5; check glucose level 3 pm today  -Mild SSI    #CKD3a  -Monitor daily renal labs     #HTN  -cont amlodipine and losartan home meds    #HLD  -continue atorvastatin home med  -Discontinued colestipol since patient has not been taking it at home or in the hospital     #Left Arm Pain  -US showed no thrombus       F: PO intake & IVF PRN   E: Replete PRN  N: Adult diet with carb control  GI ppx: None  DVT ppx: Heparin  Antibiotics:   Tubes/Lines/Drains:     Code Status: Full Code  Emergency Contact: Extended Emergency Contact Information  Primary Emergency Contact: SaraObey  Home Phone:  [phone]  Work Phone: [phone]  Relation: Child     Disposition: 83 y.o.female admitted for leg pain. Pending placement at SNF. Anticipate LOS < 48 hrs. Observation status.     Tianna Stevenson MD  PGY-1    Disclaimer: Documentation completed with the information available at the time of input. The times in the chart may not be reflective of actual patient care times, interventions, or procedures. Documentation occurs after the physical care of the patient.

## 2023-12-07 NOTE — PROGRESS NOTES
Occupational Therapy    Occupational Therapy Treatment    Name: Mariaelena Ruiz  MRN: [id number]  : 1940  Date: 23  Time Calculation  Start Time: 1106  Stop Time: 1137  Time Calculation (min): 31 min    Assessment:  OT Assessment: Pt demo's improvements with mobility and endurance but would benefit from continued skilled services to address deficits  Prognosis: Good  Barriers to Discharge: None  Evaluation/Treatment Tolerance: Patient limited by pain, Patient limited by fatigue  Medical Staff Made Aware: Yes  End of Session Communication: Bedside nurse  End of Session Patient Position: Bed, 3 rail up  Plan:  Treatment Interventions: ADL retraining  OT Frequency: 3 times per week  OT Discharge Recommendations: Moderate intensity level of continued care  Equipment Recommended upon Discharge: Wheeled walker  OT Recommended Transfer Status: Assist of 1  OT - OK to Discharge: Yes (per OT POC)    Subjective   Previous Visit Info:  OT Last Visit  OT Received On: 23  General:  General  Reason for Referral: Referred to OT following sciatica  Referred By: Tianna Stevenson MD  Past Medical History Relevant to Rehab: DM, L arm pain, and cataracts  Prior to Session Communication: Bedside nurse  Patient Position Received: Bed, 3 rail up, Alarm on  General Comment: Pt pleasent and agreeable to therapy. Reports she is probably going home today, educated pt on benefits of continued therapy    Pain Assessment:  Pain Assessment  Pain Assessment: 0-10  Pain Score: 7  Pain Type: Acute pain  Pain Location: Arm  Pain Orientation: Left  Multiple Pain Sites: Two     Objective   Activities of Daily Living: LE Dressing  LE Dressing: Yes  Adult Briefs Level of Assistance: Moderate assistance  LE Dressing Where Assessed: Edge of bed  LE Dressing Comments: used forward flexion to arrange feet through, stood at walker with assist to arrange over hips and tear off old briefs. increased time needed    Toileting  Toileting Level  of Assistance: Moderate assistance  Where Assessed: Other (Comment)  Toileting Comments: during brief change completed front hygiene in stance at WW, required assist to complete rear    Functional Standing Tolerance:  Functional Standing Tolerance  Time: ~15 minutes  Activity: brief change and funct mobility acitivity  Functional Standing Tolerance Comments: Difficulty WBing through LLE, instructed to bear weight through walker. CGA.  Bed Mobility/Transfers: Bed Mobility  Bed Mobility: Yes  Bed Mobility 1  Bed Mobility 1: Supine to sitting, Sitting to supine  Level of Assistance 1: Modified independent  Bed Mobility Comments 1: using bed rails, demo'd improved bed mobility this session, increased time    Transfers  Transfer: Yes  Transfer 1  Transfer From 1: Sit to  Transfer to 1: Stand  Technique 1: Sit to stand, Stand to sit  Transfer Device 1: Walker  Transfer Level of Assistance 1: Contact guard  Trials/Comments 1: Improved mobility demo'd this session, completed multiple trials with increasing fatigue noted     Sitting Balance:  Static Sitting Balance  Static Sitting-Balance Support: Feet supported, No upper extremity supported  Static Sitting-Level of Assistance: Close supervision    Outcome Measures:  [facility] Daily Activity  Putting on and taking off regular lower body clothing: A lot  Bathing (including washing, rinsing, drying): A lot  Putting on and taking off regular upper body clothing: A little  Toileting, which includes using toilet, bedpan or urinal: A lot  Taking care of personal grooming such as brushing teeth: A little  Eating Meals: None  Daily Activity - Total Score: 16        Education Documentation  Body Mechanics, taught by Raghu Almaguer OT at 12/7/2023 12:38 PM.  Learner: Patient  Readiness: Acceptance  Method: Explanation  Response: Verbalizes Understanding    Precautions, taught by Raghu Almaguer OT at 12/7/2023 12:38 PM.  Learner: Patient  Readiness: Acceptance  Method:  Explanation  Response: Verbalizes Understanding    Home Exercise Program, taught by Raghu Almaguer OT at 12/7/2023 12:38 PM.  Learner: Patient  Readiness: Acceptance  Method: Explanation  Response: Verbalizes Understanding    ADL Training, taught by Raghu Almaguer OT at 12/7/2023 12:38 PM.  Learner: Patient  Readiness: Acceptance  Method: Explanation  Response: Verbalizes Understanding    Education Comments  No comments found.      Goals:  Encounter Problems       Encounter Problems (Active)       OT Goals       Pt will tolerate 10min stand during functional task completion with no more than 1 rest break in order to increase endurance for functional task completion.  (Progressing)       Start:  12/05/23    Expected End:  12/19/23            Pt will increase static/dynamic stand to increase safety and independence with functional task completion.   (Progressing)       Start:  12/05/23    Expected End:  12/19/23            Pt will demo ADL routine and meaningful daily activities using modifications as needed  (Progressing)       Start:  12/05/23    Expected End:  12/19/23            Pt will demo increased functional mobility to tolerate tasks necessary to complete ADL routine.  (Progressing)       Start:  12/05/23    Expected End:  12/19/23            Pt will demo and/or verbalize 2-3 energy conservation techniques to incorporate into functional mobility or ADL to improve performance and increase independence.  (Progressing)       Start:  12/05/23    Expected End:  12/19/23

## 2023-12-07 NOTE — PROGRESS NOTES
Physical Therapy    Physical Therapy Treatment    Patient Name: Mariaelena Ruiz  MRN: [id number]  Today's Date: 12/7/2023  Time Calculation  Start Time: 1320  Stop Time: 1349  Time Calculation (min): 29 min       Assessment/Plan   PT Assessment  PT Assessment Results: Decreased strength, Decreased endurance, Decreased mobility, Obesity, Pain  Rehab Prognosis: Good  Evaluation/Treatment Tolerance: Patient limited by fatigue, Patient limited by pain  Medical Staff Made Aware: Yes  End of Session Communication: Bedside nurse  End of Session Patient Position: Bed, 3 rail up, Alarm on (Pt's son is present with her)  PT Plan  Inpatient/Swing Bed or Outpatient: Inpatient  PT Plan  Treatment/Interventions: Bed mobility, Transfer training, Gait training, Strengthening, Therapeutic exercise  PT Plan: Skilled PT  PT Frequency: 3 times per week  PT Discharge Recommendations: Moderate intensity level of continued care  Equipment Recommended upon Discharge: Wheeled walker  PT - OK to Discharge: Yes (progressing towards goals)      General Visit Information:   PT  Visit  PT Received On: 12/07/23  Response to Previous Treatment: Patient with no complaints from previous session.  General  Reason for Referral:  (84 yo female admitted 2' to L arm/leg pain, sciatica)  Referred By:  (Dr. JUAN Trimble)  Prior to Session Communication: Bedside nurse  Patient Position Received: Bed, 3 rail up, Alarm on  General Comment:  (Pt supine, pleasant ans agreeable to work with PT, Pt is moving less cautiously today. She states that she has less pain today)    Subjective   Precautions:     Vital Signs:       Objective   Pain:  Pain Assessment  Pain Assessment: 0-10  Pain Score: 4  Pain Type: Acute pain  Pain Location:  (Arm and Hip)  Pain Orientation: Left  Cognition:  Cognition  Overall Cognitive Status: Within Functional Limits  Postural Control:     Extremity/Trunk Assessments:        RLE   RLE : Within Functional Limits  LLE   LLE : Within  Functional Limits  Activity Tolerance:  Activity Tolerance  Endurance: Tolerates 10 - 20 min exercise with multiple rests  Treatments:  Therapeutic Exercise  Therapeutic Exercise Performed: Yes  Therapeutic Exercise Activity 1:  (Pt peformed the following sitting exercises; B heel/toe raises, B LAQ, B hip flexion and B resisted hip abd/add each x 20 reps with rest periods between sets. Pt performed 6 sit<>stand functional strengthening trials bed<>walker)    Bed Mobility  Bed Mobility: Yes  Bed Mobility 1  Bed Mobility 1: Supine to sitting, Sitting to supine  Level of Assistance 1: Minimum assistance (1 person)    Ambulation/Gait Training  Ambulation/Gait Training Performed: Yes  Ambulation/Gait Training 1  Surface 1: Level tile  Device 1: Rolling walker  Assistance 1: Minimum assistance, Moderate assistance (1 person)  Quality of Gait 1: Decreased step length (decreased eli)  Comments/Distance (ft) 1:  (5 feet forwrad/backward 6 times)  Transfers  Transfer: Yes  Transfer 1  Transfer From 1: Bed to  Transfer to 1: Stand  Technique 1: Sit to stand, Stand to sit  Transfer Device 1:  (wheeled walker)  Transfer Level of Assistance 1: Minimum assistance (1 person)    Outcome Measures:  [facility] Basic Mobility  Turning from your back to your side while in a flat bed without using bedrails: A little  Moving from lying on your back to sitting on the side of a flat bed without using bedrails: A little  Moving to and from bed to chair (including a wheelchair): A lot  Standing up from a chair using your arms (e.g. wheelchair or bedside chair): A lot  To walk in hospital room: A lot  Climbing 3-5 steps with railing: A lot  Basic Mobility - Total Score: 14    Education Documentation  No documentation found.  Education Comments  No comments found.        OP EDUCATION:       Encounter Problems       Encounter Problems (Active)       Mobility       STG - Patient will ambulate 50-80 feet DAPHNE with wheeled walker (Progressing)        Start:  12/05/23    Expected End:  12/19/23               Transfers       STG - Patient to transfer to and from sit to supine independently  (Progressing)       Start:  12/05/23    Expected End:  12/19/23            STG - Patient will transfer sit to and from stand MOD I with wheeled walker (Progressing)       Start:  12/05/23    Expected End:  12/19/23

## 2023-12-07 NOTE — DISCHARGE SUMMARY
"Discharge Diagnosis  Sciatica of left side    Issues Requiring Follow-Up  -Sciata--Follow up with PT/OT, Home Health  -PCP Hospital Follow Up    Discharge Meds     Your medication list        START taking these medications        Instructions Last Dose Given Next Dose Due   HYDROcodone-acetaminophen 5-325 mg tablet  Commonly known as: Norco      Take 1 tablet by mouth every 6 hours if needed for severe pain (7 - 10) for up to 3 days.              CONTINUE taking these medications        Instructions Last Dose Given Next Dose Due   BD Ultra-Fine Short Pen Needle 31 gauge x 5/16\" needle  Generic drug: pen needle, diabetic      USE TO INJECT INSULIN      SUBCUTANEOUSLY TWO TIMES A DAY       FreeStyle glucose monitoring kit  Commonly known as: OneTouch Ultra2 Meter      1 each 3 times a day.       insulin syringe-needle U-100 30G X 1/2\" 0.5 mL syringe           BD Insulin Syringe Ultra-Fine 30G X 1/2\" 0.5 mL syringe  Generic drug: insulin syringe-needle U-100      USE TO INJECT INSULIN      SUBCUTANEOUSLY TWO TIMES A DAY       lancets misc      CHECK SUGAR 3X DAILY              ASK your doctor about these medications        Instructions Last Dose Given Next Dose Due   acetaminophen 325 mg tablet  Commonly known as: Tylenol           amLODIPine 5 mg tablet  Commonly known as: Norvasc      TAKE ONE TABLET BY MOUTH DAILY       aspirin 81 mg capsule           atorvastatin 20 mg tablet  Commonly known as: Lipitor      TAKE ONE TABLET BY MOUTH EVERY DAY       cholecalciferol 50 mcg (2,000 unit) capsule  Commonly known as: Vitamin D-3           coenzyme Q-10 100 mg capsule           COENZYME Q10 (BULK) MISC           colestipol 1 gram tablet  Commonly known as: Colestid      TAKE ONE TABLET BY MOUTH DAILY       dorzolamide 2 % ophthalmic solution  Commonly known as: Trusopt           DULoxetine 60 mg DR capsule  Commonly known as: Cymbalta      TAKE ONE CAPSULE BY MOUTH DAILY       gabapentin 300 mg capsule  Commonly known as: " Neurontin           HumaLOG KwikPen Insulin 100 unit/mL injection  Generic drug: insulin lispro      INJECT SUBCUTANEOUSLY 15   UNITS IN THE MORNING AND 10UNITS WITH DINNER       HumaLOG U-100 Insulin 100 unit/mL injection  Generic drug: insulin lispro           insulin glargine 100 unit/mL injection  Commonly known as: Lantus U-100 Insulin      INJECT SUBCUTANEOUSLY 35   UNITS IN THE MORNING AND 20UNITS IN THE EVENING       losartan 50 mg tablet  Commonly known as: Cozaar           MULTI VITAMIN ORAL           multivit34-folic ac-NADH-coQ10 1-5-50 mg tablet           omega-3 fatty acids-fish oil 300-1,000 mg capsule           OneTouch Ultra Test strip  Generic drug: blood sugar diagnostic                     Where to Get Your Medications        These medications were sent to [id number] - [place], [place] - [address] [id number]      Phone: [phone]   HYDROcodone-acetaminophen 5-325 mg tablet         Test Results Pending At Discharge  Pending Labs       Order Current Status    Urine Culture Preliminary result            Hospital Course  Mariaelena Ruiz is a 83 y.o. female with PMH of DM, L arm pain, and cataracts who presented to the hospital for leg pain. She notes that she began to have new onset pain in her left leg that started on Thursday, 11/30. The pain radiates down her leg but is especially concentrated on the back of her thigh. The pain is sharp and is exacerbated when she stands straight and is reduced when she bends forward. She has never had similar pain in the past. She denies recent or past history of trauma. She notes that her pain goes away when she sits with her knee bent. She took Vicodin, which helped with her pain. She denies any changes in bowel or bladder incontinence, but she does endorse baseline incontinence. She denies weakness of the leg or elsewhere. She denies fevers, chills, and night sweats. She denies recent infections, cuts, or scrapes.      There were no acute overnight events. This morning, the patient stated that her pain was reduced but not gone completely. She denied urinary symptoms or other infectious symptoms.      This afternoon, the patient was hypoglycemic. Confirmed with patient that her home regimen is 35 Lantus and 15 Humalog in the AM and 20 Lantus and 10 Humalog in the PM. Her AM and PM Lantus was held, sliding scale was maintained. We will recheck her glucose and modify the regimen as needed.     12/6/23: There were no acute overnight events. This morning, the patient stated that her pain was improved. She denied any changes in bowel or bladder symptoms. On exam, she had normal muscle strength of the left lower extremity. US showed no thrombus in the left upper extremity. She had no hypoglycemia symptoms this morning.     12/7/23: There were no acute overnight events. This morning, the patient stated that her pain was improved. She denied weakness of the left lower extremity and has been able to ambulate to the bathroom with her walker. She had no hypoglycemia symptoms this morning. She has no other complaints this morning. Her colestipol was discontinued since she is not taking it here or at home. Miralax was added for constipation prevention.    Pertinent Physical Exam At Time of Discharge  Physical Examination:   Gen: well appearing, in NAD  HEENT: AT/NC, no conjunctival pallor, anicteric sclera, EOMI   Neck: supple, no LAD/JVD  Chest: CTAB, no wheezing / labored respirations  CVS: RRR, no murmurs  Abd: soft, flat, NT/ND, BS+  Ext: no cyanosis/clubbing or pedal edema; normal muscle strength on exam of left lower extremity   Neuro: AOx3, motor 5/5 globally, sensation intact    Outpatient Follow-Up  Future Appointments   Date Time Provider Department Center   12/12/2023 10:20 AM Keanu Thomason MD [id number]   1/8/2024  2:30 PM Bill Garland DO [id number]         Tianna Stevenson MD  PGY-1

## 2023-12-07 NOTE — NURSING NOTE
Assumed care of patient. Patient is A&Ox3. Call light in reach. Patient is on room air. Patient is lying in bed resting.

## 2023-12-08 ENCOUNTER — PATIENT OUTREACH (OUTPATIENT)
Dept: CARE COORDINATION | Facility: CLINIC | Age: 83
End: 2023-12-08
Payer: MEDICARE

## 2023-12-08 ENCOUNTER — DOCUMENTATION (OUTPATIENT)
Dept: PRIMARY CARE | Facility: CLINIC | Age: 83
End: 2023-12-08
Payer: MEDICARE

## 2023-12-08 LAB — BACTERIA UR CULT: ABNORMAL

## 2023-12-08 NOTE — PROGRESS NOTES
Discharge Facility:[facility]  Discharge Diagnosis:Sciatica of left side low back  Admission Date:12/04/23  Discharge Date: 12/07/23    PCP Appointment Date:none available sent to pcp office  Specialist Appointment Date:   Hospital Encounter and Summary: Linked   See discharge assessment below for further details  Engagement  Call Start Time: 0900 (12/8/2023  8:59 AM)    Medications  Medications reviewed with patient/caregiver?: Yes (hydocodone 5/325) (12/8/2023  8:59 AM)  Is the patient having any side effects they believe may be caused by any medication additions or changes?: No (12/8/2023  8:59 AM)  Does the patient have all medications ordered at discharge?: Yes (12/8/2023  8:59 AM)  Is the patient taking all medications as directed (includes completed medication regime)?: Yes (12/8/2023  8:59 AM)    Appointments  Does the patient have a primary care provider?: Yes (12/8/2023  8:59 AM)  Care Management Interventions: Advised patient to make appointment (12/8/2023  8:59 AM)    Self Management  Has home health visited the patient within 72 hours of discharge?: Not applicable (12/8/2023  8:59 AM)  Has all Durable Medical Equipment (DME) been delivered?: No (12/8/2023  8:59 AM)    Patient Teaching  Does the patient have access to their discharge instructions?: Yes (12/8/2023  8:59 AM)  Care Management Interventions: Reviewed instructions with patient (12/8/2023  8:59 AM)  What is the patient's perception of their health status since discharge?: Improving (still having some pain when standing) (12/8/2023  8:59 AM)    Wrap Up  Call End Time: 0910 (12/8/2023  8:59 AM)

## 2023-12-14 ENCOUNTER — CLINICAL SUPPORT (OUTPATIENT)
Dept: HOME HEALTH SERVICES | Facility: HOME HEALTH | Age: 83
End: 2023-12-14
Payer: MEDICARE

## 2023-12-20 ENCOUNTER — PATIENT OUTREACH (OUTPATIENT)
Dept: CARE COORDINATION | Facility: CLINIC | Age: 83
End: 2023-12-20
Payer: MEDICARE

## 2023-12-20 NOTE — PROGRESS NOTES
Unable to reach patient for call back after patient's follow up appointment with PCP.   TATAM with call back number for patient to call if needed   If no voicemail available call attempts x 2 were made to contact the patient to assist with any questions or concerns patient may have.

## 2023-12-21 ENCOUNTER — HOME CARE VISIT (OUTPATIENT)
Dept: HOME HEALTH SERVICES | Facility: HOME HEALTH | Age: 83
End: 2023-12-21

## 2023-12-21 NOTE — CASE COMMUNICATION
Patient requested delayed HH SOC. PT attempted to schedule for today, patient reports not feeling well and requests beginning services following Jim.

## 2023-12-26 DIAGNOSIS — E11.3213 TYPE 2 DIABETES MELLITUS WITH MILD NONPROLIFERATIVE RETINOPATHY OF BOTH EYES AND MACULAR EDEMA, UNSPECIFIED WHETHER LONG TERM INSULIN USE (MULTI): ICD-10-CM

## 2023-12-27 RX ORDER — INSULIN GLARGINE 100 [IU]/ML
INJECTION, SOLUTION SUBCUTANEOUS
Qty: 50 ML | Refills: 0 | Status: SHIPPED | OUTPATIENT
Start: 2023-12-27

## 2023-12-28 ENCOUNTER — HOME CARE VISIT (OUTPATIENT)
Dept: HOME HEALTH SERVICES | Facility: HOME HEALTH | Age: 83
End: 2023-12-28
Payer: MEDICARE

## 2023-12-28 VITALS
SYSTOLIC BLOOD PRESSURE: 122 MMHG | OXYGEN SATURATION: 96 % | TEMPERATURE: 97.4 F | HEART RATE: 86 BPM | DIASTOLIC BLOOD PRESSURE: 84 MMHG

## 2023-12-28 PROCEDURE — 1090000001 HH PPS REVENUE CREDIT

## 2023-12-28 PROCEDURE — 1090000002 HH PPS REVENUE DEBIT

## 2023-12-28 PROCEDURE — 1090000003 HH PPS REVENUE ADJ

## 2023-12-28 PROCEDURE — 0023 HH SOC

## 2023-12-28 PROCEDURE — G0151 HHCP-SERV OF PT,EA 15 MIN: HCPCS | Mod: HHH

## 2023-12-28 PROCEDURE — 169592 NO-PAY CLAIM PROCEDURE

## 2023-12-28 SDOH — HEALTH STABILITY: PHYSICAL HEALTH: EXERCISE ACTIVITY: APS, LAQ,HIP FLEXION

## 2023-12-28 SDOH — HEALTH STABILITY: PHYSICAL HEALTH: PHYSICAL EXERCISE: SEATED

## 2023-12-28 SDOH — HEALTH STABILITY: PHYSICAL HEALTH: PHYSICAL EXERCISE: 20

## 2023-12-28 SDOH — HEALTH STABILITY: PHYSICAL HEALTH: EXERCISE ACTIVITY: PIRIFORMIS STRETCH

## 2023-12-28 ASSESSMENT — ENCOUNTER SYMPTOMS
PAIN LOCATION - PAIN SEVERITY: 0/10
PAIN LOCATION - PAIN QUALITY: DEEP ACHE
LOSS OF SENSATION IN FEET: 0
LIMITED RANGE OF MOTION: 1
SUBJECTIVE PAIN PROGRESSION: GRADUALLY IMPROVING
PAIN LOCATION: LEFT SHOULDER
PAIN: 1
DEPRESSION: 0
PAIN LOCATION - PAIN FREQUENCY: CONSTANT
PERSON REPORTING PAIN: PATIENT
HIGHEST PAIN SEVERITY IN PAST 24 HOURS: 8/10
PAIN LOCATION: LEFT LEG
PAIN LOCATION - RELIEVING FACTORS: MEDS
OCCASIONAL FEELINGS OF UNSTEADINESS: 0
PAIN LOCATION - PAIN SEVERITY: 6/10
LOWEST PAIN SEVERITY IN PAST 24 HOURS: 0/10
PAIN LOCATION - RELIEVING FACTORS: MEDS

## 2023-12-28 ASSESSMENT — ACTIVITIES OF DAILY LIVING (ADL)
ENTERING_EXITING_HOME: NEEDS ASSISTANCE
AMBULATION ASSISTANCE: 1
PHYSICAL TRANSFERS ASSESSED: 1
AMBULATION ASSISTANCE: STAND BY ASSIST
AMBULATION_DISTANCE/DURATION_TOLERATED: 50 FEET
CURRENT_FUNCTION: STAND BY ASSIST
AMBULATION ASSISTANCE ON FLAT SURFACES: 1

## 2023-12-28 NOTE — CASE COMMUNICATION
Admission to home care completed. Patient lives alone and stays on main floor of home. Uses a rollator or wh.walker for support. Using the rollator as a chair in the home. Recent pain in lue and lle. Limited arom lue. Has support from sons that live nearby. Patients goal is to be able to remain at home, which means maintaining independence.

## 2023-12-29 ENCOUNTER — HOME CARE VISIT (OUTPATIENT)
Dept: HOME HEALTH SERVICES | Facility: HOME HEALTH | Age: 83
End: 2023-12-29
Payer: MEDICARE

## 2024-01-05 ENCOUNTER — PATIENT OUTREACH (OUTPATIENT)
Dept: CARE COORDINATION | Facility: CLINIC | Age: 84
End: 2024-01-05

## 2024-01-05 ENCOUNTER — HOME CARE VISIT (OUTPATIENT)
Dept: HOME HEALTH SERVICES | Facility: HOME HEALTH | Age: 84
End: 2024-01-05
Payer: MEDICARE

## 2024-01-05 SDOH — HEALTH STABILITY: PHYSICAL HEALTH: EXERCISE TYPE: PLANS TO CONTINUE WITH THEREXS ESTABLISHED ON INITIAL VISIT

## 2024-01-05 ASSESSMENT — ACTIVITIES OF DAILY LIVING (ADL)
HOME_HEALTH_OASIS: 01
OASIS_M1830: 02

## 2024-01-05 NOTE — CASE COMMUNICATION
Declined further home care therapy. Seen for initial assessment only. Wants to try and do exercises on her own

## 2024-01-05 NOTE — PROGRESS NOTES
Call placed regarding one month post discharge follow up call.  At time of outreach call the patient feels as if their condition has improved  since initial visit with PCP or specialist.  Questions or concerns regarding recovery period addressed at this time. (Individualize as needed if questions arise)  Reviewed any PCP or specialists progress notes/labs/radiology reports if applicable and addressed any questions or concerns.

## 2024-01-08 ENCOUNTER — APPOINTMENT (OUTPATIENT)
Dept: PRIMARY CARE | Facility: CLINIC | Age: 84
End: 2024-01-08
Payer: MEDICARE

## 2024-01-09 ASSESSMENT — ACTIVITIES OF DAILY LIVING (ADL): OASIS_M1830: 03

## 2024-01-25 PROCEDURE — G0180 MD CERTIFICATION HHA PATIENT: HCPCS | Performed by: INTERNAL MEDICINE

## 2024-02-13 NOTE — PROGRESS NOTES
Subjective   Patient ID: Mariaelena Ruiz is a 83 y.o. female who presents for Medicare Annual Wellness Visit Subsequent.    HPI     Admitted to [facility] 12/4-12/7 for lumbar radiculopathy. She did complete PT at home. Pain has since resolved.     Sugars are good at home   Does not check her BP at home    She never followed up with urology for right renal mass         Review of Systems   All other systems reviewed and are negative.      Objective   There were no vitals taken for this visit.    Physical Exam  Constitutional:       Appearance: Normal appearance.   HENT:      Head: Normocephalic and atraumatic.   Cardiovascular:      Rate and Rhythm: Normal rate and regular rhythm.      Heart sounds: Normal heart sounds. No murmur heard.     No gallop.   Pulmonary:      Effort: Pulmonary effort is normal. No respiratory distress.      Breath sounds: No wheezing or rales.   Skin:     General: Skin is warm and dry.      Findings: No rash.   Neurological:      Mental Status: She is alert and oriented to person, place, and time. Mental status is at baseline.   Psychiatric:         Mood and Affect: Mood normal.         Behavior: Behavior normal.         Assessment/Plan        #DM2  -Order A1c today   -Continue Humalog 15 AC breakfast and 10 AC dinner . Cont Lantus 35 units in am and 20 units in PM --recommended snack at bedtime for low am sugar   -Eye dr: 1/2024      #HTN  -Uncontrolled. Cont amlodipine 5mg daily  -Increase losartan to 100mg daily      #Osteopenia  -previous right hip rx. Recommended Prolia- patient declines      #HLD  -Cont atorvastatin 20mg daily, LDL at goal <70     #CKD3a  -Stable, cont ARB/statin , had several previous UTIs so will defer SGLT2i      #right adrenal mass  -has appt with Dr. Thomason in March     #b/l Renal mass  -Has appt with urology tomorrow at 9am     #Lumbar radiculopathy   -Cont gabapentin and duloxetine   -Recently completed home PT      Influenza: 10/12/22  COVID: x2  Prevnar 13: UTD    Pneumovax 23: UTD   Shingrix: Never  Mamm: 10/25/17- NI  DEXA: 3/11/20- osteopenia   Pap: NI   Colorectal ca: 9/27/16- 10 years      RTC 3 mo

## 2024-02-15 ENCOUNTER — OFFICE VISIT (OUTPATIENT)
Dept: PRIMARY CARE | Facility: CLINIC | Age: 84
End: 2024-02-15
Payer: MEDICARE

## 2024-02-15 ENCOUNTER — LAB (OUTPATIENT)
Dept: LAB | Facility: LAB | Age: 84
End: 2024-02-15
Payer: MEDICARE

## 2024-02-15 VITALS
HEIGHT: 62 IN | DIASTOLIC BLOOD PRESSURE: 80 MMHG | HEART RATE: 96 BPM | OXYGEN SATURATION: 91 % | WEIGHT: 225 LBS | SYSTOLIC BLOOD PRESSURE: 174 MMHG | BODY MASS INDEX: 41.41 KG/M2

## 2024-02-15 DIAGNOSIS — I12.9 HYPERTENSION ASSOCIATED WITH STAGE 3 CHRONIC KIDNEY DISEASE DUE TO TYPE 2 DIABETES MELLITUS (MULTI): ICD-10-CM

## 2024-02-15 DIAGNOSIS — E11.3213 TYPE 2 DIABETES MELLITUS WITH BOTH EYES AFFECTED BY MILD NONPROLIFERATIVE RETINOPATHY AND MACULAR EDEMA, WITH LONG-TERM CURRENT USE OF INSULIN (MULTI): ICD-10-CM

## 2024-02-15 DIAGNOSIS — N18.30 HYPERTENSION ASSOCIATED WITH STAGE 3 CHRONIC KIDNEY DISEASE DUE TO TYPE 2 DIABETES MELLITUS (MULTI): ICD-10-CM

## 2024-02-15 DIAGNOSIS — E66.01 MORBID (SEVERE) OBESITY DUE TO EXCESS CALORIES (MULTI): ICD-10-CM

## 2024-02-15 DIAGNOSIS — E11.22 HYPERTENSION ASSOCIATED WITH STAGE 3 CHRONIC KIDNEY DISEASE DUE TO TYPE 2 DIABETES MELLITUS (MULTI): ICD-10-CM

## 2024-02-15 DIAGNOSIS — E78.00 ELEVATED LDL CHOLESTEROL LEVEL: ICD-10-CM

## 2024-02-15 DIAGNOSIS — Z79.4 TYPE 2 DIABETES MELLITUS WITH BOTH EYES AFFECTED BY MILD NONPROLIFERATIVE RETINOPATHY AND MACULAR EDEMA, WITH LONG-TERM CURRENT USE OF INSULIN (MULTI): ICD-10-CM

## 2024-02-15 DIAGNOSIS — Z00.00 ROUTINE GENERAL MEDICAL EXAMINATION AT HEALTH CARE FACILITY: Primary | ICD-10-CM

## 2024-02-15 DIAGNOSIS — Z09 HOSPITAL DISCHARGE FOLLOW-UP: ICD-10-CM

## 2024-02-15 DIAGNOSIS — E27.8 ADRENAL MASS, RIGHT (MULTI): ICD-10-CM

## 2024-02-15 PROBLEM — I73.9 ARTERIAL INSUFFICIENCY OF LOWER EXTREMITY (CMS-HCC): Status: RESOLVED | Noted: 2023-03-24 | Resolved: 2024-02-15

## 2024-02-15 LAB
ALBUMIN SERPL BCP-MCNC: 3.7 G/DL (ref 3.4–5)
ALP SERPL-CCNC: 113 U/L (ref 33–136)
ALT SERPL W P-5'-P-CCNC: 15 U/L (ref 7–45)
ANION GAP SERPL CALC-SCNC: 19 MMOL/L (ref 10–20)
AST SERPL W P-5'-P-CCNC: 24 U/L (ref 9–39)
BASOPHILS # BLD AUTO: 0.09 X10*3/UL (ref 0–0.1)
BASOPHILS NFR BLD AUTO: 0.6 %
BILIRUB SERPL-MCNC: 0.4 MG/DL (ref 0–1.2)
BUN SERPL-MCNC: 23 MG/DL (ref 6–23)
CALCIUM SERPL-MCNC: 9.6 MG/DL (ref 8.6–10.3)
CHLORIDE SERPL-SCNC: 101 MMOL/L (ref 98–107)
CHOLEST SERPL-MCNC: 145 MG/DL (ref 0–199)
CHOLESTEROL/HDL RATIO: 2.8
CO2 SERPL-SCNC: 27 MMOL/L (ref 21–32)
CREAT SERPL-MCNC: 1.12 MG/DL (ref 0.5–1.05)
EGFRCR SERPLBLD CKD-EPI 2021: 49 ML/MIN/1.73M*2
EOSINOPHIL # BLD AUTO: 0.17 X10*3/UL (ref 0–0.4)
EOSINOPHIL NFR BLD AUTO: 1.2 %
ERYTHROCYTE [DISTWIDTH] IN BLOOD BY AUTOMATED COUNT: 14.7 % (ref 11.5–14.5)
GLUCOSE SERPL-MCNC: 190 MG/DL (ref 74–99)
HCT VFR BLD AUTO: 43.7 % (ref 36–46)
HDLC SERPL-MCNC: 51.8 MG/DL
HGB BLD-MCNC: 13.4 G/DL (ref 12–16)
IMM GRANULOCYTES # BLD AUTO: 0.05 X10*3/UL (ref 0–0.5)
IMM GRANULOCYTES NFR BLD AUTO: 0.4 % (ref 0–0.9)
LDLC SERPL CALC-MCNC: 64 MG/DL
LYMPHOCYTES # BLD AUTO: 1.47 X10*3/UL (ref 0.8–3)
LYMPHOCYTES NFR BLD AUTO: 10.4 %
MCH RBC QN AUTO: 27.7 PG (ref 26–34)
MCHC RBC AUTO-ENTMCNC: 30.7 G/DL (ref 32–36)
MCV RBC AUTO: 91 FL (ref 80–100)
MONOCYTES # BLD AUTO: 1.03 X10*3/UL (ref 0.05–0.8)
MONOCYTES NFR BLD AUTO: 7.3 %
NEUTROPHILS # BLD AUTO: 11.33 X10*3/UL (ref 1.6–5.5)
NEUTROPHILS NFR BLD AUTO: 80.1 %
NON HDL CHOLESTEROL: 93 MG/DL (ref 0–149)
NRBC BLD-RTO: 0 /100 WBCS (ref 0–0)
PLATELET # BLD AUTO: 303 X10*3/UL (ref 150–450)
POTASSIUM SERPL-SCNC: 4.2 MMOL/L (ref 3.5–5.3)
PROT SERPL-MCNC: 6.5 G/DL (ref 6.4–8.2)
RBC # BLD AUTO: 4.83 X10*6/UL (ref 4–5.2)
SODIUM SERPL-SCNC: 143 MMOL/L (ref 136–145)
TRIGL SERPL-MCNC: 144 MG/DL (ref 0–149)
VLDL: 29 MG/DL (ref 0–40)
WBC # BLD AUTO: 14.1 X10*3/UL (ref 4.4–11.3)

## 2024-02-15 PROCEDURE — 85025 COMPLETE CBC W/AUTO DIFF WBC: CPT

## 2024-02-15 PROCEDURE — 1159F MED LIST DOCD IN RCRD: CPT | Performed by: INTERNAL MEDICINE

## 2024-02-15 PROCEDURE — 1125F AMNT PAIN NOTED PAIN PRSNT: CPT | Performed by: INTERNAL MEDICINE

## 2024-02-15 PROCEDURE — 80053 COMPREHEN METABOLIC PANEL: CPT

## 2024-02-15 PROCEDURE — 80061 LIPID PANEL: CPT

## 2024-02-15 PROCEDURE — 3077F SYST BP >= 140 MM HG: CPT | Performed by: INTERNAL MEDICINE

## 2024-02-15 PROCEDURE — 99214 OFFICE O/P EST MOD 30 MIN: CPT | Performed by: INTERNAL MEDICINE

## 2024-02-15 PROCEDURE — 3079F DIAST BP 80-89 MM HG: CPT | Performed by: INTERNAL MEDICINE

## 2024-02-15 PROCEDURE — 83036 HEMOGLOBIN GLYCOSYLATED A1C: CPT

## 2024-02-15 PROCEDURE — 1170F FXNL STATUS ASSESSED: CPT | Performed by: INTERNAL MEDICINE

## 2024-02-15 PROCEDURE — G0439 PPPS, SUBSEQ VISIT: HCPCS | Performed by: INTERNAL MEDICINE

## 2024-02-15 PROCEDURE — 1157F ADVNC CARE PLAN IN RCRD: CPT | Performed by: INTERNAL MEDICINE

## 2024-02-15 PROCEDURE — 1036F TOBACCO NON-USER: CPT | Performed by: INTERNAL MEDICINE

## 2024-02-15 PROCEDURE — 1160F RVW MEDS BY RX/DR IN RCRD: CPT | Performed by: INTERNAL MEDICINE

## 2024-02-15 PROCEDURE — 36415 COLL VENOUS BLD VENIPUNCTURE: CPT

## 2024-02-15 RX ORDER — LOSARTAN POTASSIUM 100 MG/1
100 TABLET ORAL DAILY
Qty: 90 TABLET | Refills: 3 | Status: SHIPPED | OUTPATIENT
Start: 2024-02-15 | End: 2025-02-14

## 2024-02-15 ASSESSMENT — PATIENT HEALTH QUESTIONNAIRE - PHQ9
SUM OF ALL RESPONSES TO PHQ9 QUESTIONS 1 AND 2: 0
1. LITTLE INTEREST OR PLEASURE IN DOING THINGS: NOT AT ALL
2. FEELING DOWN, DEPRESSED OR HOPELESS: NOT AT ALL

## 2024-02-15 ASSESSMENT — ACTIVITIES OF DAILY LIVING (ADL)
DOING_HOUSEWORK: INDEPENDENT
GROCERY_SHOPPING: NEEDS ASSISTANCE
DRESSING: INDEPENDENT
BATHING: INDEPENDENT
MANAGING_FINANCES: INDEPENDENT
TAKING_MEDICATION: INDEPENDENT

## 2024-02-15 NOTE — ASSESSMENT & PLAN NOTE
Stable based on symptoms and exam. Follow established treatment plan. Follow up at least annually.

## 2024-02-15 NOTE — PATIENT INSTRUCTIONS
Get labs today, dont worry about fasting   See urology they will call you , if not by Monday please let me know     4 months

## 2024-02-16 ENCOUNTER — OFFICE VISIT (OUTPATIENT)
Dept: UROLOGY | Facility: CLINIC | Age: 84
End: 2024-02-16
Payer: MEDICARE

## 2024-02-16 VITALS — SYSTOLIC BLOOD PRESSURE: 138 MMHG | HEART RATE: 108 BPM | DIASTOLIC BLOOD PRESSURE: 74 MMHG

## 2024-02-16 DIAGNOSIS — N28.89 RIGHT RENAL MASS: Primary | ICD-10-CM

## 2024-02-16 LAB
EST. AVERAGE GLUCOSE BLD GHB EST-MCNC: 226 MG/DL
HBA1C MFR BLD: 9.5 %

## 2024-02-16 PROCEDURE — 1036F TOBACCO NON-USER: CPT | Performed by: UROLOGY

## 2024-02-16 PROCEDURE — 3075F SYST BP GE 130 - 139MM HG: CPT | Performed by: UROLOGY

## 2024-02-16 PROCEDURE — 99204 OFFICE O/P NEW MOD 45 MIN: CPT | Performed by: UROLOGY

## 2024-02-16 PROCEDURE — 1157F ADVNC CARE PLAN IN RCRD: CPT | Performed by: UROLOGY

## 2024-02-16 PROCEDURE — 1159F MED LIST DOCD IN RCRD: CPT | Performed by: UROLOGY

## 2024-02-16 PROCEDURE — 3078F DIAST BP <80 MM HG: CPT | Performed by: UROLOGY

## 2024-02-16 PROCEDURE — 1160F RVW MEDS BY RX/DR IN RCRD: CPT | Performed by: UROLOGY

## 2024-02-16 PROCEDURE — 1125F AMNT PAIN NOTED PAIN PRSNT: CPT | Performed by: UROLOGY

## 2024-02-16 NOTE — PROGRESS NOTES
02/16/2024  83-year-old female developed an incidental finding right renal mass about a year ago and the last imaging study 6 months ago 2 cm right renal mass voiding well, no blood in urine no flank pain    Patient has no nausea, no vomiting, no fever.    Creatinine 1.12, BUN 23 2/15/2024    Renal ultrasound 3/21/2023  IMPRESSION:  1. Cholelithiasis with no other sonographic evidence for acute  cholecystitis.  2. Right nephrolithiasis. No right hydronephrosis.  3. 1.8 cm solid-appearing lesion at the right kidney, new since prior  MRI kidneys 01/21/2016. Nonemergent contrast-enhanced MRI recommended  for further characterization.    MRI 7/26/2023  IMPRESSION:  1.  2 cm mildly enhancing cortical mass in the anterior right kidney,  suspicious for neoplasm. No evidence of tumor thrombus or metastatic  disease of the abdomen otherwise.  Persistent 18 mm calculus in the right kidney which appears to  obstruct upper pole major calyx with severe upstream caliectasis and  inflammatory urothelial hyperenhancement. Suggest urologic  consultation for these findings.  2. 4 cm right adrenal adenoma is stable for over a decade. Mild limb  thickening of the left adrenal gland is also unchanged.  3. Since 2016, development of subcentimeter cystic lesion of the  pancreas. Consider follow-up MRCP in 1 year to document stability.    We discussed right renal lesion, incidental finding asymptomatic  We discussed the CT scan abdomen with and without contrast  We discussed conservative management for right renal mass  All the questions were answered, the patient expressed understanding and agreed to the plan.    Impression  Right renal mass, 2 cm, asymptomatic    Plan  CT abdomen with and without contrast for right renal mass follow-up, call patient result  Renal ultrasound in 6 months for right renal mass follow-up  Appointment in 6 months    New Patient     Chief Complaint   Patient presents with    Nephrolithiasis     Patient was in  ER 12/07/23 For Sciatica pain on Left side . She is here to go over results for  mass on Right Kidney . She is voiding well but frequently during the day .         Physical Exam     TODAYS LAB RESULTS:  No Urine Sample     US right upper quadrant  Status: Final result     PACS Images     Show images for US right upper quadrant  Signed by    Signed Time Phone Pager   Abbey Almendarez, DO 3/21/2023 21:22 [phone]     Exam Information    Status Exam Begun Exam Ended   Final 3/21/2023 20:09      Study Result    Narrative & Impression   STUDY:  US RUQ ABDOMEN;  3/21/2023 8:42 pm     INDICATION:  RUQ tenderness on exam .     COMPARISON:  CT abdomen and pelvis 03/21/2023, abdominal ultrasound 02/13/2013.  MRI kidneys 01/21/2016     ACCESSION NUMBER(S):  [id number]     ORDERING CLINICIAN:  ED CORONADO     TECHNIQUE:  Multiple images of the right upper quadrant were obtained.     FINDINGS:  LIVER:  The liver measures  15.1 cm. Echogenicity within normal limits.     GALLBLADDER:  There is cholelithiasis.No gallbladder wall thickening or  pericholecystic fluid.  Negative sonographic Irvin's sign.     BILIARY TREE:  No biliary ductal dilation. The common bile duct measures  5 mm.     PANCREAS:  The pancreas is obscured by overlying bowel gas.     RIGHT KIDNEY:  The right kidney measures  11 cm in length. There is no  hydronephrosis. There is a 1.7 x 1.0 x 1.6 cm calculus at the  superior pole of the right kidney.  There is a 1.8 x 1.6 x 1.7 cm solid-appearing hyperechoic lesion at  the interpolar region of the right kidney, new since prior MRI  kidneys 01/21/2016.     IMPRESSION:  1. Cholelithiasis with no other sonographic evidence for acute  cholecystitis.  2. Right nephrolithiasis. No right hydronephrosis.  3. 1.8 cm solid-appearing lesion at the right kidney, new since prior  MRI kidneys 01/21/2016. Nonemergent contrast-enhanced MRI recommended  for further characterization.                 Incidental Findings  (**[id number]**):  1.8 cm solid-appearing lesion at the right kidney, new since prior  MRI kidneys 01/21/2016. Nonemergent contrast-enhanced MRI recommended  for further characterization.  PURPLE ALERT:  An alert notification was sent to the emergency  department regarding incidental or unexpected imaging finding(s) in  the radiology examination.                 === 07/06/23 ===    MR ABDOMEN RENAL W AND WO CONTRAST    - Impression -  1.  2 cm mildly enhancing cortical mass in the anterior right kidney,  suspicious for neoplasm. No evidence of tumor thrombus or metastatic  disease of the abdomen otherwise.  Persistent 18 mm calculus in the right kidney which appears to  obstruct upper pole major calyx with severe upstream caliectasis and  inflammatory urothelial hyperenhancement. Suggest urologic  consultation for these findings.  2. 4 cm right adrenal adenoma is stable for over a decade. Mild limb  thickening of the left adrenal gland is also unchanged.  3. Since 2016, development of subcentimeter cystic lesion of the  pancreas. Consider follow-up MRCP in 1 year to document stability.            ASSESSMENT&PLAN:      IMPRESSIONS:

## 2024-02-19 DIAGNOSIS — Z79.4 TYPE 2 DIABETES MELLITUS WITH BOTH EYES AFFECTED BY MILD NONPROLIFERATIVE RETINOPATHY AND MACULAR EDEMA, WITH LONG-TERM CURRENT USE OF INSULIN (MULTI): Primary | ICD-10-CM

## 2024-02-19 DIAGNOSIS — E11.3213 TYPE 2 DIABETES MELLITUS WITH BOTH EYES AFFECTED BY MILD NONPROLIFERATIVE RETINOPATHY AND MACULAR EDEMA, WITH LONG-TERM CURRENT USE OF INSULIN (MULTI): Primary | ICD-10-CM

## 2024-02-19 NOTE — TELEPHONE ENCOUNTER
Requested Prescriptions     Pending Prescriptions Disp Refills    blood sugar diagnostic (OneTouch Ultra Test) strip 200 strip 2     Sig: Check sugar twice a day

## 2024-02-20 DIAGNOSIS — Z79.4 TYPE 2 DIABETES MELLITUS WITH BOTH EYES AFFECTED BY MILD NONPROLIFERATIVE RETINOPATHY AND MACULAR EDEMA, WITH LONG-TERM CURRENT USE OF INSULIN (MULTI): Primary | ICD-10-CM

## 2024-02-20 DIAGNOSIS — E11.3213 TYPE 2 DIABETES MELLITUS WITH BOTH EYES AFFECTED BY MILD NONPROLIFERATIVE RETINOPATHY AND MACULAR EDEMA, WITH LONG-TERM CURRENT USE OF INSULIN (MULTI): Primary | ICD-10-CM

## 2024-02-21 RX ORDER — BLOOD SUGAR DIAGNOSTIC
STRIP MISCELLANEOUS
Qty: 200 STRIP | Refills: 2 | Status: SHIPPED | OUTPATIENT
Start: 2024-02-21

## 2024-03-01 ENCOUNTER — HOSPITAL ENCOUNTER (OUTPATIENT)
Dept: RADIOLOGY | Facility: HOSPITAL | Age: 84
Discharge: HOME | End: 2024-03-01
Payer: MEDICARE

## 2024-03-01 DIAGNOSIS — N28.89 RIGHT RENAL MASS: ICD-10-CM

## 2024-03-01 PROCEDURE — 74170 CT ABD WO CNTRST FLWD CNTRST: CPT | Performed by: RADIOLOGY

## 2024-03-01 PROCEDURE — 2550000001 HC RX 255 CONTRASTS: Performed by: UROLOGY

## 2024-03-01 PROCEDURE — 74170 CT ABD WO CNTRST FLWD CNTRST: CPT

## 2024-03-01 RX ADMIN — IOHEXOL 75 ML: 350 INJECTION, SOLUTION INTRAVENOUS at 13:50

## 2024-03-05 ENCOUNTER — PATIENT OUTREACH (OUTPATIENT)
Dept: CARE COORDINATION | Facility: CLINIC | Age: 84
End: 2024-03-05
Payer: MEDICARE

## 2024-03-19 ENCOUNTER — OFFICE VISIT (OUTPATIENT)
Dept: ENDOCRINOLOGY | Facility: CLINIC | Age: 84
End: 2024-03-19
Payer: MEDICARE

## 2024-03-19 VITALS — SYSTOLIC BLOOD PRESSURE: 141 MMHG | HEART RATE: 89 BPM | DIASTOLIC BLOOD PRESSURE: 76 MMHG

## 2024-03-19 DIAGNOSIS — E27.8 ADRENAL MASS, RIGHT (MULTI): Primary | ICD-10-CM

## 2024-03-19 PROCEDURE — 99204 OFFICE O/P NEW MOD 45 MIN: CPT | Performed by: INTERNAL MEDICINE

## 2024-03-19 PROCEDURE — 3078F DIAST BP <80 MM HG: CPT | Performed by: INTERNAL MEDICINE

## 2024-03-19 PROCEDURE — 1159F MED LIST DOCD IN RCRD: CPT | Performed by: INTERNAL MEDICINE

## 2024-03-19 PROCEDURE — 3077F SYST BP >= 140 MM HG: CPT | Performed by: INTERNAL MEDICINE

## 2024-03-19 PROCEDURE — 1157F ADVNC CARE PLAN IN RCRD: CPT | Performed by: INTERNAL MEDICINE

## 2024-03-19 PROCEDURE — 1160F RVW MEDS BY RX/DR IN RCRD: CPT | Performed by: INTERNAL MEDICINE

## 2024-03-19 PROCEDURE — 1036F TOBACCO NON-USER: CPT | Performed by: INTERNAL MEDICINE

## 2024-03-19 RX ORDER — DEXAMETHASONE 1 MG/1
TABLET ORAL
Qty: 1 TABLET | Refills: 0 | Status: SHIPPED | OUTPATIENT
Start: 2024-03-19 | End: 2024-05-15 | Stop reason: ALTCHOICE

## 2024-03-19 ASSESSMENT — ENCOUNTER SYMPTOMS
ABDOMINAL PAIN: 0
APPETITE CHANGE: 0
NAUSEA: 0
HEADACHES: 0
FREQUENCY: 0
POLYDIPSIA: 0
CONSTIPATION: 0
SHORTNESS OF BREATH: 0
COUGH: 0
VOMITING: 0
FEVER: 0
DIARRHEA: 0

## 2024-03-19 NOTE — PATIENT INSTRUCTIONS
RECOMMENDATIONS  Dexamethasone suppression test  1.  Dexamethasone 1 mg by mouth at 11PM  2.  Draw blood for cortisol, dexamethasone, metanephrines at 8AM the next morning  Call for results 1 week later if you have not received them.    If normal, no routine follow up imaging of the adrenal is required.

## 2024-03-19 NOTE — PROGRESS NOTES
History Of Present Illness  Mariaelena Ruiz is a 83 y.o. female with right adrenal mass    Adrenal mass seen on CT from March 2023 when she presented with cholecystitis  Cholecystectomy    Documented on CT kidney 3/1/24 that this mass has been present and stable in size for 11 years.     History of diabetes mellitus, insulin requring  History of HTN controlled    Denies any systemic glucocorticoid or steroid injection      Past Medical History  She has a past medical history of Acute cystitis without hematuria (08/19/2019), Body mass index (BMI)40.0-44.9, adult (09/30/2021), Epidermal cyst (01/06/2022), Hyperglycemia, unspecified, Hypertensive chronic kidney disease with stage 1 through stage 4 chronic kidney disease, or unspecified chronic kidney disease (06/17/2021), Incontinence without sensory awareness (06/19/2017), Local infection of the skin and subcutaneous tissue, unspecified (12/19/2018), Morbid (severe) obesity due to excess calories (CMS/HCC) (01/07/2022), Morbid (severe) obesity due to excess calories (CMS/HCC) (06/18/2021), Morbid (severe) obesity due to excess calories (CMS/HCC) (09/30/2021), Other conditions influencing health status (11/17/2013), Other conditions influencing health status (01/09/2015), Other conditions influencing health status (11/17/2013), Other specified symptoms and signs involving the circulatory and respiratory systems (02/03/2020), Pain in left hip (01/22/2014), Personal history of diseases of the skin and subcutaneous tissue, Personal history of other diseases of the circulatory system, Personal history of other diseases of the musculoskeletal system and connective tissue, Personal history of other diseases of the nervous system and sense organs, Personal history of other diseases of the respiratory system, Personal history of other endocrine, nutritional and metabolic disease, Personal history of other endocrine, nutritional and metabolic disease, Personal history of  "other endocrine, nutritional and metabolic disease, Personal history of other endocrine, nutritional and metabolic disease, Personal history of other infectious and parasitic diseases, Personal history of other infectious and parasitic diseases, Personal history of other medical treatment, Personal history of other specified conditions (01/17/2018), Personal history of other specified conditions, Personal history of transient ischemic attack (TIA), and cerebral infarction without residual deficits, and Traumatic ischemia of muscle, subsequent encounter (05/02/2020).    Surgical History  She has a past surgical history that includes Other surgical history (08/07/2013); Colonoscopy (02/08/2017); Other surgical history (02/08/2017); Other surgical history (02/08/2017); Cystoscopy (02/08/2017); Hip surgery (02/08/2017); Bunionectomy (02/08/2017); CT guided percutaneous biopsy bone (7/18/2013); CT guided percutaneous biopsy bone deep (11/15/2013); and MR angio head wo IV contrast (8/9/2014).     Social History  She reports that she has never smoked. She has never used smokeless tobacco. No history on file for alcohol use and drug use.    Family History  No family history on file.    Medications  Current Outpatient Medications   Medication Instructions    acetaminophen (TYLENOL) 650 mg, oral, Every 6 hours PRN    amLODIPine (Norvasc) 5 mg tablet TAKE ONE TABLET BY MOUTH DAILY    aspirin 81 mg, oral, Daily    atorvastatin (Lipitor) 20 mg tablet TAKE ONE TABLET BY MOUTH EVERY DAY    BD Insulin Syringe Ultra-Fine 0.5 mL 30 gauge x 1/2\" syringe USE TO INJECT INSULIN      SUBCUTANEOUSLY TWO TIMES A DAY    BD Ultra-Fine Short Pen Needle 31 gauge x 5/16\" needle USE TO INJECT INSULIN      SUBCUTANEOUSLY TWO TIMES A DAY    blood sugar diagnostic (OneTouch Ultra Test) strip Check sugar twice a day    coenzyme Q-10 100 mg capsule oral, Every other day    FreeStyle glucose monitoring (OneTouch Ultra2 Meter) kit 1 each, miscellaneous, " "3 times daily    gabapentin (NEURONTIN) 600 mg, oral, Every morning    HumaLOG KwikPen Insulin 100 unit/mL injection INJECT SUBCUTANEOUSLY 15   UNITS IN THE MORNING AND 10UNITS WITH DINNER    insulin glargine (Lantus U-100 Insulin) 100 unit/mL injection INJECT SUBCUTANEOUSLY 35   UNITS IN THE MORNING AND 20UNITS IN THE EVENING    insulin lispro (HumaLOG U-100 Insulin) 100 unit/mL injection subcutaneous    insulin syringe-needle U-100 30G X 1/2\" 0.5 mL syringe USE TO INJECT INSULIN SC BID    lancets misc CHECK SUGAR 3X DAILY    losartan (COZAAR) 100 mg, oral, Daily    multivit-min/ferrous fumarate (MULTI VITAMIN ORAL) 1 tablet, oral, Every morning    UNABLE TO FIND 1 capsule, oral, Every other day, Med Name: Turmeric Curcumin       Allergies  Patient has no known allergies.    Review of Systems   Constitutional:  Negative for appetite change and fever.   HENT:          Denies dry mouth   Eyes:  Negative for visual disturbance.   Respiratory:  Negative for cough and shortness of breath.    Cardiovascular:  Negative for chest pain.   Gastrointestinal:  Negative for abdominal pain, constipation, diarrhea, nausea and vomiting.   Endocrine: Negative for polydipsia and polyuria.   Genitourinary:  Negative for frequency.   Neurological:  Negative for headaches.         Last Recorded Vitals  Blood pressure 141/76, pulse 89.    Physical Exam  Constitutional:       General: She is not in acute distress.     Appearance: She is obese.      Comments: Examined in wheelchair   Eyes:      Extraocular Movements: Extraocular movements intact.   Neck:      Thyroid: No thyromegaly.      Comments: No dorsocervical or supraclavicular fat pad enlargement  Cardiovascular:      Pulses:           Radial pulses are 2+ on the right side and 2+ on the left side.   Musculoskeletal:      Right lower leg: No edema.      Left lower leg: No edema.   Lymphadenopathy:      Cervical: No cervical adenopathy.   Neurological:      Mental Status: She is " alert.      Motor: No tremor.   Psychiatric:         Mood and Affect: Affect normal.          Relevant Results  CT ABDOMEN (3/21/23)  Impression   Moderate bladder wall thickening may relate to under distention.  Correlate with symptomatology and urinalysis to exclude infectious  cystitis.     There is a 2.7 x 4.0 cm right adrenal mass which measures is  incompletely characterized on this monophasic study but stable.  Nodular thickening of the left adrenal gland. Correlate with prior  workup or if warranted this can be further evaluated with nonemergent  adrenal washout CT or MRI.     Bilateral nonobstructing renal calculi. There is a 1.4 cm hyperdense  lesion in the interpolar region of the right kidney measuring higher  than simple fluid attenuation, incompletely characterized. Additional  subcentimeter hyperdense focus is noted in the interpolar region of  the left kidney. Differential considerations include complex  hemorrhagic/proteinaceous cyst versus solid renal neoplasm such as  RCC. Further evaluation with dedicated renal CT or MRI is recommended.     Scattered colonic diverticula without evidence for acute  diverticulitis.     There is loss of vertebral body and disc height at L4-5 with  interbody fusion which on correlation with prior CT likely relate to  sequela of prior discitis/osteomyelitis. There is approximate 12 mm  retropulsion into the canal with postsurgical changes of laminectomy.  Correlate with symptomatology for the need further evaluation.       MRI ABDOMEN (7/25/23)  Impression   1.  2 cm mildly enhancing cortical mass in the anterior right kidney,  suspicious for neoplasm. No evidence of tumor thrombus or metastatic  disease of the abdomen otherwise.  Persistent 18 mm calculus in the right kidney which appears to  obstruct upper pole major calyx with severe upstream caliectasis and  inflammatory urothelial hyperenhancement. Suggest urologic  consultation for these findings.  2. 4 cm right  adrenal adenoma is stable for over a decade. Mild limb  thickening of the left adrenal gland is also unchanged.  3. Since 2016, development of subcentimeter cystic lesion of the  pancreas. Consider follow-up MRCP in 1 year to document stability.       CT ABDOMEN (3/1/24)  IMPRESSION:  1.  An enhancing 17 mm cortical lesion in the anterior aspect of the  right kidney is similar in size to the CT of 03/21/2023 and  consistent with a renal neoplasm.  2. Redemonstration of a 12 mm right renal calculus with dilatation of  an upper pole calyx.  3. A 9 mm cortical cyst in the lateral aspect of the left kidney.  Punctate nonobstructing calculi in the inferior pole of the left  kidney.  4. A 16 mm nonspecific hypodensity in the right lobe of the liver as  described. Attention to this finding on future follow-up scans is  recommended.  5. Stable right adrenal adenoma.      IMPRESSION  RIGHT ADRENAL ADENOMA  Known 4 cm mass with imaging characteristics of adenoma stable in size for 11 years  Stability and density indicate the risk of malignancy is low  Suspicion of endocrine hyperfunction is also low, but with history of diabetes and HTN, testing is indicated        RECOMMENDATIONS  Dexamethasone suppression test  1.  Dexamethasone 1 mg by mouth at 11PM  2.  Draw blood for cortisol, dexamethasone, metanephrines at 8AM the next morning  Call for results 1 week later if you have not received them.    If normal, no routine follow up imaging of the adrenal is required.

## 2024-03-19 NOTE — LETTER
March 19, 2024     Bill Garland DO  [id number] [facility]  [address]  [place] [address]    Patient: Mariaelena Ruiz   YOB: 1940   Date of Visit: 3/19/2024       Dear Dr. Bill Garland DO:    Thank you for referring Mariaelena Ruiz to me for evaluation. Below are my notes for this consultation.  If you have questions, please do not hesitate to call me. I look forward to following your patient along with you.       Sincerely,     Keanu Thomason MD      CC: No Recipients  ______________________________________________________________________________________    History Of Present Illness  Mariaelena Ruiz is a 83 y.o. female with right adrenal mass    Adrenal mass seen on CT from March 2023 when she presented with cholecystitis  Cholecystectomy    Documented on CT kidney 3/1/24 that this mass has been present and stable in size for 11 years.     History of diabetes mellitus, insulin requring  History of HTN controlled    Denies any systemic glucocorticoid or steroid injection      Past Medical History  She has a past medical history of Acute cystitis without hematuria (08/19/2019), Body mass index (BMI)40.0-44.9, adult (09/30/2021), Epidermal cyst (01/06/2022), Hyperglycemia, unspecified, Hypertensive chronic kidney disease with stage 1 through stage 4 chronic kidney disease, or unspecified chronic kidney disease (06/17/2021), Incontinence without sensory awareness (06/19/2017), Local infection of the skin and subcutaneous tissue, unspecified (12/19/2018), Morbid (severe) obesity due to excess calories (CMS/HCC) (01/07/2022), Morbid (severe) obesity due to excess calories (CMS/HCC) (06/18/2021), Morbid (severe) obesity due to excess calories (CMS/HCC) (09/30/2021), Other conditions influencing health status (11/17/2013), Other conditions influencing health status (01/09/2015), Other conditions influencing health status (11/17/2013), Other specified symptoms and signs involving the circulatory and  respiratory systems (02/03/2020), Pain in left hip (01/22/2014), Personal history of diseases of the skin and subcutaneous tissue, Personal history of other diseases of the circulatory system, Personal history of other diseases of the musculoskeletal system and connective tissue, Personal history of other diseases of the nervous system and sense organs, Personal history of other diseases of the respiratory system, Personal history of other endocrine, nutritional and metabolic disease, Personal history of other endocrine, nutritional and metabolic disease, Personal history of other endocrine, nutritional and metabolic disease, Personal history of other endocrine, nutritional and metabolic disease, Personal history of other infectious and parasitic diseases, Personal history of other infectious and parasitic diseases, Personal history of other medical treatment, Personal history of other specified conditions (01/17/2018), Personal history of other specified conditions, Personal history of transient ischemic attack (TIA), and cerebral infarction without residual deficits, and Traumatic ischemia of muscle, subsequent encounter (05/02/2020).    Surgical History  She has a past surgical history that includes Other surgical history (08/07/2013); Colonoscopy (02/08/2017); Other surgical history (02/08/2017); Other surgical history (02/08/2017); Cystoscopy (02/08/2017); Hip surgery (02/08/2017); Bunionectomy (02/08/2017); CT guided percutaneous biopsy bone (7/18/2013); CT guided percutaneous biopsy bone deep (11/15/2013); and MR angio head wo IV contrast (8/9/2014).     Social History  She reports that she has never smoked. She has never used smokeless tobacco. No history on file for alcohol use and drug use.    Family History  No family history on file.    Medications  Current Outpatient Medications   Medication Instructions   • acetaminophen (TYLENOL) 650 mg, oral, Every 6 hours PRN   • amLODIPine (Norvasc) 5 mg tablet  "TAKE ONE TABLET BY MOUTH DAILY   • aspirin 81 mg, oral, Daily   • atorvastatin (Lipitor) 20 mg tablet TAKE ONE TABLET BY MOUTH EVERY DAY   • BD Insulin Syringe Ultra-Fine 0.5 mL 30 gauge x 1/2\" syringe USE TO INJECT INSULIN      SUBCUTANEOUSLY TWO TIMES A DAY   • BD Ultra-Fine Short Pen Needle 31 gauge x 5/16\" needle USE TO INJECT INSULIN      SUBCUTANEOUSLY TWO TIMES A DAY   • blood sugar diagnostic (OneTouch Ultra Test) strip Check sugar twice a day   • coenzyme Q-10 100 mg capsule oral, Every other day   • FreeStyle glucose monitoring (OneTouch Ultra2 Meter) kit 1 each, miscellaneous, 3 times daily   • gabapentin (NEURONTIN) 600 mg, oral, Every morning   • HumaLOG KwikPen Insulin 100 unit/mL injection INJECT SUBCUTANEOUSLY 15   UNITS IN THE MORNING AND 10UNITS WITH DINNER   • insulin glargine (Lantus U-100 Insulin) 100 unit/mL injection INJECT SUBCUTANEOUSLY 35   UNITS IN THE MORNING AND 20UNITS IN THE EVENING   • insulin lispro (HumaLOG U-100 Insulin) 100 unit/mL injection subcutaneous   • insulin syringe-needle U-100 30G X 1/2\" 0.5 mL syringe USE TO INJECT INSULIN SC BID   • lancets misc CHECK SUGAR 3X DAILY   • losartan (COZAAR) 100 mg, oral, Daily   • multivit-min/ferrous fumarate (MULTI VITAMIN ORAL) 1 tablet, oral, Every morning   • UNABLE TO FIND 1 capsule, oral, Every other day, Med Name: Turmeric Curcumin       Allergies  Patient has no known allergies.    Review of Systems   Constitutional:  Negative for appetite change and fever.   HENT:          Denies dry mouth   Eyes:  Negative for visual disturbance.   Respiratory:  Negative for cough and shortness of breath.    Cardiovascular:  Negative for chest pain.   Gastrointestinal:  Negative for abdominal pain, constipation, diarrhea, nausea and vomiting.   Endocrine: Negative for polydipsia and polyuria.   Genitourinary:  Negative for frequency.   Neurological:  Negative for headaches.         Last Recorded Vitals  Blood pressure 141/76, pulse " 89.    Physical Exam  Constitutional:       General: She is not in acute distress.     Appearance: She is obese.      Comments: Examined in wheelchair   Eyes:      Extraocular Movements: Extraocular movements intact.   Neck:      Thyroid: No thyromegaly.      Comments: No dorsocervical or supraclavicular fat pad enlargement  Cardiovascular:      Pulses:           Radial pulses are 2+ on the right side and 2+ on the left side.   Musculoskeletal:      Right lower leg: No edema.      Left lower leg: No edema.   Lymphadenopathy:      Cervical: No cervical adenopathy.   Neurological:      Mental Status: She is alert.      Motor: No tremor.   Psychiatric:         Mood and Affect: Affect normal.          Relevant Results  CT ABDOMEN (3/21/23)  Impression   Moderate bladder wall thickening may relate to under distention.  Correlate with symptomatology and urinalysis to exclude infectious  cystitis.     There is a 2.7 x 4.0 cm right adrenal mass which measures is  incompletely characterized on this monophasic study but stable.  Nodular thickening of the left adrenal gland. Correlate with prior  workup or if warranted this can be further evaluated with nonemergent  adrenal washout CT or MRI.     Bilateral nonobstructing renal calculi. There is a 1.4 cm hyperdense  lesion in the interpolar region of the right kidney measuring higher  than simple fluid attenuation, incompletely characterized. Additional  subcentimeter hyperdense focus is noted in the interpolar region of  the left kidney. Differential considerations include complex  hemorrhagic/proteinaceous cyst versus solid renal neoplasm such as  RCC. Further evaluation with dedicated renal CT or MRI is recommended.     Scattered colonic diverticula without evidence for acute  diverticulitis.     There is loss of vertebral body and disc height at L4-5 with  interbody fusion which on correlation with prior CT likely relate to  sequela of prior discitis/osteomyelitis. There is  approximate 12 mm  retropulsion into the canal with postsurgical changes of laminectomy.  Correlate with symptomatology for the need further evaluation.       MRI ABDOMEN (7/25/23)  Impression   1.  2 cm mildly enhancing cortical mass in the anterior right kidney,  suspicious for neoplasm. No evidence of tumor thrombus or metastatic  disease of the abdomen otherwise.  Persistent 18 mm calculus in the right kidney which appears to  obstruct upper pole major calyx with severe upstream caliectasis and  inflammatory urothelial hyperenhancement. Suggest urologic  consultation for these findings.  2. 4 cm right adrenal adenoma is stable for over a decade. Mild limb  thickening of the left adrenal gland is also unchanged.  3. Since 2016, development of subcentimeter cystic lesion of the  pancreas. Consider follow-up MRCP in 1 year to document stability.       CT ABDOMEN (3/1/24)  IMPRESSION:  1.  An enhancing 17 mm cortical lesion in the anterior aspect of the  right kidney is similar in size to the CT of 03/21/2023 and  consistent with a renal neoplasm.  2. Redemonstration of a 12 mm right renal calculus with dilatation of  an upper pole calyx.  3. A 9 mm cortical cyst in the lateral aspect of the left kidney.  Punctate nonobstructing calculi in the inferior pole of the left  kidney.  4. A 16 mm nonspecific hypodensity in the right lobe of the liver as  described. Attention to this finding on future follow-up scans is  recommended.  5. Stable right adrenal adenoma.      IMPRESSION  RIGHT ADRENAL ADENOMA  Known 4 cm mass with imaging characteristics of adenoma stable in size for 11 years  Stability and density indicate the risk of malignancy is low  Suspicion of endocrine hyperfunction is also low, but with history of diabetes and HTN, testing is indicated        RECOMMENDATIONS  Dexamethasone suppression test  1.  Dexamethasone 1 mg by mouth at 11PM  2.  Draw blood for cortisol, dexamethasone, metanephrines at 8AM the  next morning  Call for results 1 week later if you have not received them.    If normal, no routine follow up imaging of the adrenal is required.

## 2024-03-22 ENCOUNTER — LAB (OUTPATIENT)
Dept: LAB | Facility: LAB | Age: 84
End: 2024-03-22
Payer: MEDICARE

## 2024-03-22 DIAGNOSIS — E27.8 ADRENAL MASS, RIGHT (MULTI): ICD-10-CM

## 2024-03-22 LAB — CORTIS AM PEAK SERPL-MSCNC: 5.5 UG/DL (ref 5–20)

## 2024-03-22 PROCEDURE — 36415 COLL VENOUS BLD VENIPUNCTURE: CPT

## 2024-03-22 PROCEDURE — 80375 DRUG/SUBSTANCE NOS 1-3: CPT

## 2024-03-22 PROCEDURE — 82533 TOTAL CORTISOL: CPT

## 2024-03-22 PROCEDURE — 83835 ASSAY OF METANEPHRINES: CPT

## 2024-03-26 LAB — DEXAMETHASONE SERPL-MCNC: 720.3 NG/DL

## 2024-03-27 LAB
ANNOTATION COMMENT IMP: ABNORMAL
METANEPHS SERPL-SCNC: 0.11 NMOL/L (ref 0–0.49)
NORMETANEPH FREE SERPL-SCNC: 0.92 NMOL/L (ref 0–0.89)

## 2024-04-08 DIAGNOSIS — Z79.4 TYPE 2 DIABETES MELLITUS WITH BOTH EYES AFFECTED BY MILD NONPROLIFERATIVE RETINOPATHY AND MACULAR EDEMA, WITH LONG-TERM CURRENT USE OF INSULIN (MULTI): ICD-10-CM

## 2024-04-08 DIAGNOSIS — E11.3213 TYPE 2 DIABETES MELLITUS WITH BOTH EYES AFFECTED BY MILD NONPROLIFERATIVE RETINOPATHY AND MACULAR EDEMA, WITH LONG-TERM CURRENT USE OF INSULIN (MULTI): ICD-10-CM

## 2024-04-08 NOTE — TELEPHONE ENCOUNTER
Requested Prescriptions     Pending Prescriptions Disp Refills    insulin lispro (HumaLOG KwikPen Insulin) 100 unit/mL injection 30 mL 2     Sig: Inject 15 units before breakfast and 10 units before dinner

## 2024-04-09 RX ORDER — INSULIN LISPRO 100 [IU]/ML
INJECTION, SOLUTION INTRAVENOUS; SUBCUTANEOUS
Qty: 30 ML | Refills: 2 | Status: SHIPPED | OUTPATIENT
Start: 2024-04-09

## 2024-04-12 ENCOUNTER — CLINICAL SUPPORT (OUTPATIENT)
Dept: PRIMARY CARE | Facility: CLINIC | Age: 84
End: 2024-04-12
Payer: MEDICARE

## 2024-04-12 DIAGNOSIS — R30.0 DYSURIA: Primary | ICD-10-CM

## 2024-04-12 LAB
POC APPEARANCE, URINE: ABNORMAL
POC BILIRUBIN, URINE: NEGATIVE
POC BLOOD, URINE: ABNORMAL
POC COLOR, URINE: YELLOW
POC GLUCOSE, URINE: NEGATIVE MG/DL
POC KETONES, URINE: ABNORMAL MG/DL
POC LEUKOCYTES, URINE: ABNORMAL
POC NITRITE,URINE: NEGATIVE
POC PH, URINE: 5.5 PH
POC PROTEIN, URINE: ABNORMAL MG/DL
POC SPECIFIC GRAVITY, URINE: 1.02
POC UROBILINOGEN, URINE: 0.2 EU/DL

## 2024-04-12 PROCEDURE — 81003 URINALYSIS AUTO W/O SCOPE: CPT | Performed by: INTERNAL MEDICINE

## 2024-04-12 RX ORDER — CIPROFLOXACIN 500 MG/1
500 TABLET ORAL 2 TIMES DAILY
Qty: 10 TABLET | Refills: 0 | Status: SHIPPED | OUTPATIENT
Start: 2024-04-12 | End: 2024-04-17

## 2024-04-12 NOTE — PROGRESS NOTES
Patient dropped off urine in non sterile cont.  Current uti symptoms - pain, pressure, and frequency.   Positive in office dip  Sent in cipro 5 days   Patient notified

## 2024-05-15 ENCOUNTER — OFFICE VISIT (OUTPATIENT)
Dept: PRIMARY CARE | Facility: CLINIC | Age: 84
End: 2024-05-15
Payer: MEDICARE

## 2024-05-15 VITALS
HEART RATE: 95 BPM | BODY MASS INDEX: 39.87 KG/M2 | DIASTOLIC BLOOD PRESSURE: 79 MMHG | WEIGHT: 218 LBS | OXYGEN SATURATION: 96 % | SYSTOLIC BLOOD PRESSURE: 145 MMHG

## 2024-05-15 DIAGNOSIS — N18.31 STAGE 3A CHRONIC KIDNEY DISEASE (MULTI): ICD-10-CM

## 2024-05-15 DIAGNOSIS — N39.0 URINARY TRACT INFECTION WITH HEMATURIA, SITE UNSPECIFIED: ICD-10-CM

## 2024-05-15 DIAGNOSIS — R53.83 OTHER FATIGUE: ICD-10-CM

## 2024-05-15 DIAGNOSIS — D72.829 LEUKOCYTOSIS, UNSPECIFIED TYPE: ICD-10-CM

## 2024-05-15 DIAGNOSIS — R31.9 URINARY TRACT INFECTION WITH HEMATURIA, SITE UNSPECIFIED: ICD-10-CM

## 2024-05-15 DIAGNOSIS — E11.3213 TYPE 2 DIABETES MELLITUS WITH BOTH EYES AFFECTED BY MILD NONPROLIFERATIVE RETINOPATHY AND MACULAR EDEMA, WITH LONG-TERM CURRENT USE OF INSULIN (MULTI): Primary | ICD-10-CM

## 2024-05-15 DIAGNOSIS — Z79.4 TYPE 2 DIABETES MELLITUS WITH BOTH EYES AFFECTED BY MILD NONPROLIFERATIVE RETINOPATHY AND MACULAR EDEMA, WITH LONG-TERM CURRENT USE OF INSULIN (MULTI): Primary | ICD-10-CM

## 2024-05-15 LAB
POC APPEARANCE, URINE: CLEAR
POC BILIRUBIN, URINE: NEGATIVE
POC BLOOD, URINE: ABNORMAL
POC COLOR, URINE: YELLOW
POC GLUCOSE, URINE: NEGATIVE MG/DL
POC KETONES, URINE: ABNORMAL MG/DL
POC LEUKOCYTES, URINE: ABNORMAL
POC NITRITE,URINE: NEGATIVE
POC PH, URINE: 5.5 PH
POC PROTEIN, URINE: ABNORMAL MG/DL
POC SPECIFIC GRAVITY, URINE: 1.02
POC UROBILINOGEN, URINE: 0.2 EU/DL

## 2024-05-15 PROCEDURE — 81003 URINALYSIS AUTO W/O SCOPE: CPT | Performed by: INTERNAL MEDICINE

## 2024-05-15 PROCEDURE — 1158F ADVNC CARE PLAN TLK DOCD: CPT | Performed by: INTERNAL MEDICINE

## 2024-05-15 PROCEDURE — 1160F RVW MEDS BY RX/DR IN RCRD: CPT | Performed by: INTERNAL MEDICINE

## 2024-05-15 PROCEDURE — 99214 OFFICE O/P EST MOD 30 MIN: CPT | Performed by: INTERNAL MEDICINE

## 2024-05-15 PROCEDURE — 81001 URINALYSIS AUTO W/SCOPE: CPT

## 2024-05-15 PROCEDURE — 87186 SC STD MICRODIL/AGAR DIL: CPT

## 2024-05-15 PROCEDURE — 1036F TOBACCO NON-USER: CPT | Performed by: INTERNAL MEDICINE

## 2024-05-15 PROCEDURE — 1123F ACP DISCUSS/DSCN MKR DOCD: CPT | Performed by: INTERNAL MEDICINE

## 2024-05-15 PROCEDURE — 3077F SYST BP >= 140 MM HG: CPT | Performed by: INTERNAL MEDICINE

## 2024-05-15 PROCEDURE — 1159F MED LIST DOCD IN RCRD: CPT | Performed by: INTERNAL MEDICINE

## 2024-05-15 PROCEDURE — 3078F DIAST BP <80 MM HG: CPT | Performed by: INTERNAL MEDICINE

## 2024-05-15 PROCEDURE — 87086 URINE CULTURE/COLONY COUNT: CPT

## 2024-05-15 PROCEDURE — 1157F ADVNC CARE PLAN IN RCRD: CPT | Performed by: INTERNAL MEDICINE

## 2024-05-15 NOTE — PATIENT INSTRUCTIONS
Get nonfasting labs and urinalysis sometime this week  I will speak with Dr. Ellis regarding MRI     I will see you in 4 months

## 2024-05-15 NOTE — PROGRESS NOTES
"Subjective   Patient ID: Mariaelena Ruiz is a 83 y.o. female who presents for Follow-up.    HPI     Following with urology for right renal mass - will have MRI in August   Sugars have been \"good\" this am 80, no lows.  Reports fatigue. No dysuria, urinary frequency or urgency or hematuria        Review of Systems    Objective   There were no vitals taken for this visit.    Physical Exam    Assessment/Plan        #DM2  -Order A1c today   -Continue Humalog 15 AC breakfast and 10 AC dinner . Cont Lantus 35 units in am and 20 units in PM   -Eye dr: 1/2024      #HTN  -Cont amlodipine 5mg daily and losartan 100mg daily     #Osteopenia  -previous right hip rx. Recommended Prolia- patient declines      #HLD  -Cont atorvastatin 20mg daily, LDL at goal <70     #CKD3a  -Stable, cont ARB/statin , had several previous UTIs so will defer SGLT2i  -Check RFP      #right adrenal mass  -Evaluated by Dr. Thomason     #R right mass  -Concerning for neoplasm. Following with urology      #Lumbar radiculopathy   -Cont gabapentin and duloxetine     #Pancreas cyst  -MRCP this summer      Influenza: 10/12/22  COVID: x2  Prevnar 13: UTD   Pneumovax 23: UTD   Shingrix: Never  Mamm: 10/25/17- NI  DEXA: 3/11/20- osteopenia   Pap: NI   Colorectal ca: 9/27/16- 10 years      RTC 4 mo   "

## 2024-05-16 LAB
APPEARANCE UR: ABNORMAL
BACTERIA #/AREA URNS AUTO: ABNORMAL /HPF
BILIRUB UR STRIP.AUTO-MCNC: NEGATIVE MG/DL
COLOR UR: YELLOW
GLUCOSE UR STRIP.AUTO-MCNC: NORMAL MG/DL
KETONES UR STRIP.AUTO-MCNC: NEGATIVE MG/DL
LEUKOCYTE ESTERASE UR QL STRIP.AUTO: ABNORMAL
NITRITE UR QL STRIP.AUTO: NEGATIVE
PH UR STRIP.AUTO: 5.5 [PH]
PROT UR STRIP.AUTO-MCNC: ABNORMAL MG/DL
RBC # UR STRIP.AUTO: ABNORMAL /UL
RBC #/AREA URNS AUTO: ABNORMAL /HPF
SP GR UR STRIP.AUTO: 1.02
UROBILINOGEN UR STRIP.AUTO-MCNC: NORMAL MG/DL
WBC #/AREA URNS AUTO: >50 /HPF

## 2024-05-18 ENCOUNTER — LAB (OUTPATIENT)
Dept: LAB | Facility: LAB | Age: 84
End: 2024-05-18
Payer: MEDICARE

## 2024-05-18 DIAGNOSIS — E11.3213 TYPE 2 DIABETES MELLITUS WITH BOTH EYES AFFECTED BY MILD NONPROLIFERATIVE RETINOPATHY AND MACULAR EDEMA, WITH LONG-TERM CURRENT USE OF INSULIN (MULTI): ICD-10-CM

## 2024-05-18 DIAGNOSIS — E11.22 HYPERTENSION ASSOCIATED WITH STAGE 3 CHRONIC KIDNEY DISEASE DUE TO TYPE 2 DIABETES MELLITUS (MULTI): ICD-10-CM

## 2024-05-18 DIAGNOSIS — N18.30 HYPERTENSION ASSOCIATED WITH STAGE 3 CHRONIC KIDNEY DISEASE DUE TO TYPE 2 DIABETES MELLITUS (MULTI): ICD-10-CM

## 2024-05-18 DIAGNOSIS — R30.0 DYSURIA: Primary | ICD-10-CM

## 2024-05-18 DIAGNOSIS — N18.31 STAGE 3A CHRONIC KIDNEY DISEASE (MULTI): ICD-10-CM

## 2024-05-18 DIAGNOSIS — Z79.4 TYPE 2 DIABETES MELLITUS WITH BOTH EYES AFFECTED BY MILD NONPROLIFERATIVE RETINOPATHY AND MACULAR EDEMA, WITH LONG-TERM CURRENT USE OF INSULIN (MULTI): ICD-10-CM

## 2024-05-18 DIAGNOSIS — I12.9 HYPERTENSION ASSOCIATED WITH STAGE 3 CHRONIC KIDNEY DISEASE DUE TO TYPE 2 DIABETES MELLITUS (MULTI): ICD-10-CM

## 2024-05-18 DIAGNOSIS — D72.829 LEUKOCYTOSIS, UNSPECIFIED TYPE: ICD-10-CM

## 2024-05-18 DIAGNOSIS — N18.32 STAGE 3B CHRONIC KIDNEY DISEASE (MULTI): ICD-10-CM

## 2024-05-18 LAB
ALBUMIN SERPL BCP-MCNC: 3.8 G/DL (ref 3.4–5)
ANION GAP SERPL CALC-SCNC: 18 MMOL/L (ref 10–20)
BACTERIA UR CULT: ABNORMAL
BASOPHILS # BLD AUTO: 0.08 X10*3/UL (ref 0–0.1)
BASOPHILS NFR BLD AUTO: 0.6 %
BUN SERPL-MCNC: 20 MG/DL (ref 6–23)
CALCIUM SERPL-MCNC: 9.3 MG/DL (ref 8.6–10.3)
CHLORIDE SERPL-SCNC: 105 MMOL/L (ref 98–107)
CO2 SERPL-SCNC: 27 MMOL/L (ref 21–32)
CREAT SERPL-MCNC: 1.33 MG/DL (ref 0.5–1.05)
EGFRCR SERPLBLD CKD-EPI 2021: 40 ML/MIN/1.73M*2
EOSINOPHIL # BLD AUTO: 0.19 X10*3/UL (ref 0–0.4)
EOSINOPHIL NFR BLD AUTO: 1.5 %
ERYTHROCYTE [DISTWIDTH] IN BLOOD BY AUTOMATED COUNT: 15.2 % (ref 11.5–14.5)
EST. AVERAGE GLUCOSE BLD GHB EST-MCNC: 174 MG/DL
GLUCOSE SERPL-MCNC: 135 MG/DL (ref 74–99)
HBA1C MFR BLD: 7.7 %
HCT VFR BLD AUTO: 42.8 % (ref 36–46)
HGB BLD-MCNC: 12.8 G/DL (ref 12–16)
IMM GRANULOCYTES # BLD AUTO: 0.04 X10*3/UL (ref 0–0.5)
IMM GRANULOCYTES NFR BLD AUTO: 0.3 % (ref 0–0.9)
LYMPHOCYTES # BLD AUTO: 1.24 X10*3/UL (ref 0.8–3)
LYMPHOCYTES NFR BLD AUTO: 9.8 %
MCH RBC QN AUTO: 27.5 PG (ref 26–34)
MCHC RBC AUTO-ENTMCNC: 29.9 G/DL (ref 32–36)
MCV RBC AUTO: 92 FL (ref 80–100)
MONOCYTES # BLD AUTO: 0.77 X10*3/UL (ref 0.05–0.8)
MONOCYTES NFR BLD AUTO: 6.1 %
NEUTROPHILS # BLD AUTO: 10.32 X10*3/UL (ref 1.6–5.5)
NEUTROPHILS NFR BLD AUTO: 81.7 %
NRBC BLD-RTO: 0 /100 WBCS (ref 0–0)
PHOSPHATE SERPL-MCNC: 2.8 MG/DL (ref 2.5–4.9)
PLATELET # BLD AUTO: 270 X10*3/UL (ref 150–450)
POTASSIUM SERPL-SCNC: 4.2 MMOL/L (ref 3.5–5.3)
RBC # BLD AUTO: 4.66 X10*6/UL (ref 4–5.2)
SODIUM SERPL-SCNC: 146 MMOL/L (ref 136–145)
WBC # BLD AUTO: 12.6 X10*3/UL (ref 4.4–11.3)

## 2024-05-18 PROCEDURE — 80069 RENAL FUNCTION PANEL: CPT

## 2024-05-18 PROCEDURE — 36415 COLL VENOUS BLD VENIPUNCTURE: CPT

## 2024-05-18 PROCEDURE — 85025 COMPLETE CBC W/AUTO DIFF WBC: CPT

## 2024-05-18 PROCEDURE — 83036 HEMOGLOBIN GLYCOSYLATED A1C: CPT

## 2024-05-18 RX ORDER — CIPROFLOXACIN 500 MG/1
500 TABLET ORAL 2 TIMES DAILY
Qty: 10 TABLET | Refills: 0 | Status: SHIPPED | OUTPATIENT
Start: 2024-05-18 | End: 2024-05-23

## 2024-05-27 DIAGNOSIS — E78.00 ELEVATED LDL CHOLESTEROL LEVEL: ICD-10-CM

## 2024-05-27 DIAGNOSIS — I10 PRIMARY HYPERTENSION: ICD-10-CM

## 2024-05-27 DIAGNOSIS — M54.16 ACUTE LEFT LUMBAR RADICULOPATHY: ICD-10-CM

## 2024-05-31 RX ORDER — ATORVASTATIN CALCIUM 20 MG/1
20 TABLET, FILM COATED ORAL DAILY
Qty: 90 TABLET | Refills: 1 | Status: SHIPPED | OUTPATIENT
Start: 2024-05-31

## 2024-05-31 RX ORDER — DULOXETIN HYDROCHLORIDE 60 MG/1
60 CAPSULE, DELAYED RELEASE ORAL DAILY
Qty: 90 CAPSULE | Refills: 0 | Status: SHIPPED | OUTPATIENT
Start: 2024-05-31

## 2024-05-31 RX ORDER — AMLODIPINE BESYLATE 5 MG/1
TABLET ORAL
Qty: 90 TABLET | Refills: 1 | Status: SHIPPED | OUTPATIENT
Start: 2024-05-31

## 2024-06-16 DIAGNOSIS — K86.2 PANCREAS CYST (HHS-HCC): Primary | ICD-10-CM

## 2024-07-05 ENCOUNTER — APPOINTMENT (OUTPATIENT)
Dept: RADIOLOGY | Facility: HOSPITAL | Age: 84
End: 2024-07-05
Payer: MEDICARE

## 2024-07-14 ENCOUNTER — APPOINTMENT (OUTPATIENT)
Dept: RADIOLOGY | Facility: HOSPITAL | Age: 84
End: 2024-07-14
Payer: MEDICARE

## 2024-07-19 DIAGNOSIS — K86.2 PANCREAS CYST (HHS-HCC): ICD-10-CM

## 2024-07-21 ENCOUNTER — APPOINTMENT (OUTPATIENT)
Dept: RADIOLOGY | Facility: HOSPITAL | Age: 84
End: 2024-07-21
Payer: MEDICARE

## 2024-08-02 ENCOUNTER — APPOINTMENT (OUTPATIENT)
Dept: RADIOLOGY | Facility: HOSPITAL | Age: 84
DRG: 690 | End: 2024-08-02
Payer: MEDICARE

## 2024-08-02 ENCOUNTER — HOSPITAL ENCOUNTER (INPATIENT)
Facility: HOSPITAL | Age: 84
DRG: 690 | End: 2024-08-02
Attending: STUDENT IN AN ORGANIZED HEALTH CARE EDUCATION/TRAINING PROGRAM | Admitting: HOSPITALIST
Payer: MEDICARE

## 2024-08-02 ENCOUNTER — APPOINTMENT (OUTPATIENT)
Dept: CARDIOLOGY | Facility: HOSPITAL | Age: 84
DRG: 690 | End: 2024-08-02
Payer: MEDICARE

## 2024-08-02 DIAGNOSIS — N17.9 AKI (ACUTE KIDNEY INJURY) (CMS-HCC): ICD-10-CM

## 2024-08-02 DIAGNOSIS — R74.8 ELEVATED CK: ICD-10-CM

## 2024-08-02 DIAGNOSIS — N39.0 URINARY TRACT INFECTION WITHOUT HEMATURIA, SITE UNSPECIFIED: Primary | ICD-10-CM

## 2024-08-02 LAB
ABO GROUP (TYPE) IN BLOOD: NORMAL
ALBUMIN SERPL BCP-MCNC: 3.5 G/DL (ref 3.4–5)
ALP SERPL-CCNC: 91 U/L (ref 33–136)
ALT SERPL W P-5'-P-CCNC: 25 U/L (ref 7–45)
ANION GAP SERPL CALC-SCNC: 13 MMOL/L (ref 10–20)
ANTIBODY SCREEN: NORMAL
APPEARANCE UR: ABNORMAL
AST SERPL W P-5'-P-CCNC: 62 U/L (ref 9–39)
ATRIAL RATE: 74 BPM
BACTERIA #/AREA URNS AUTO: ABNORMAL /HPF
BASOPHILS # BLD AUTO: 0.06 X10*3/UL (ref 0–0.1)
BASOPHILS NFR BLD AUTO: 0.5 %
BILIRUB SERPL-MCNC: 0.9 MG/DL (ref 0–1.2)
BILIRUB UR STRIP.AUTO-MCNC: NEGATIVE MG/DL
BUN SERPL-MCNC: 39 MG/DL (ref 6–23)
CALCIUM SERPL-MCNC: 8.9 MG/DL (ref 8.6–10.3)
CARDIAC TROPONIN I PNL SERPL HS: 77 NG/L (ref 0–13)
CARDIAC TROPONIN I PNL SERPL HS: 97 NG/L (ref 0–13)
CHLORIDE SERPL-SCNC: 106 MMOL/L (ref 98–107)
CK SERPL-CCNC: 1953 U/L (ref 0–215)
CO2 SERPL-SCNC: 26 MMOL/L (ref 21–32)
COLOR UR: YELLOW
CREAT SERPL-MCNC: 1.62 MG/DL (ref 0.5–1.05)
EGFRCR SERPLBLD CKD-EPI 2021: 31 ML/MIN/1.73M*2
EOSINOPHIL # BLD AUTO: 0.26 X10*3/UL (ref 0–0.4)
EOSINOPHIL NFR BLD AUTO: 2 %
ERYTHROCYTE [DISTWIDTH] IN BLOOD BY AUTOMATED COUNT: 14.3 % (ref 11.5–14.5)
GLUCOSE BLD MANUAL STRIP-MCNC: 144 MG/DL (ref 74–99)
GLUCOSE BLD MANUAL STRIP-MCNC: 216 MG/DL (ref 74–99)
GLUCOSE SERPL-MCNC: 262 MG/DL (ref 74–99)
GLUCOSE UR STRIP.AUTO-MCNC: ABNORMAL MG/DL
HCT VFR BLD AUTO: 40.9 % (ref 36–46)
HGB BLD-MCNC: 13 G/DL (ref 12–16)
IMM GRANULOCYTES # BLD AUTO: 0.07 X10*3/UL (ref 0–0.5)
IMM GRANULOCYTES NFR BLD AUTO: 0.6 % (ref 0–0.9)
INR PPP: 1.1 (ref 0.9–1.1)
KETONES UR STRIP.AUTO-MCNC: NEGATIVE MG/DL
LACTATE SERPL-SCNC: 2.4 MMOL/L (ref 0.4–2)
LEUKOCYTE ESTERASE UR QL STRIP.AUTO: ABNORMAL
LYMPHOCYTES # BLD AUTO: 1.07 X10*3/UL (ref 0.8–3)
LYMPHOCYTES NFR BLD AUTO: 8.4 %
MAGNESIUM SERPL-MCNC: 1.89 MG/DL (ref 1.6–2.4)
MCH RBC QN AUTO: 28.3 PG (ref 26–34)
MCHC RBC AUTO-ENTMCNC: 31.8 G/DL (ref 32–36)
MCV RBC AUTO: 89 FL (ref 80–100)
MONOCYTES # BLD AUTO: 0.93 X10*3/UL (ref 0.05–0.8)
MONOCYTES NFR BLD AUTO: 7.3 %
MUCOUS THREADS #/AREA URNS AUTO: ABNORMAL /LPF
NEUTROPHILS # BLD AUTO: 10.3 X10*3/UL (ref 1.6–5.5)
NEUTROPHILS NFR BLD AUTO: 81.2 %
NITRITE UR QL STRIP.AUTO: ABNORMAL
NRBC BLD-RTO: 0 /100 WBCS (ref 0–0)
P AXIS: 73 DEGREES
P OFFSET: 163 MS
P ONSET: 124 MS
PH UR STRIP.AUTO: 5 [PH]
PLATELET # BLD AUTO: 238 X10*3/UL (ref 150–450)
POTASSIUM SERPL-SCNC: 3.9 MMOL/L (ref 3.5–5.3)
PR INTERVAL: 176 MS
PROT SERPL-MCNC: 6.2 G/DL (ref 6.4–8.2)
PROT UR STRIP.AUTO-MCNC: ABNORMAL MG/DL
PROTHROMBIN TIME: 11.9 SECONDS (ref 9.8–12.8)
Q ONSET: 212 MS
QRS COUNT: 12 BEATS
QRS DURATION: 88 MS
QT INTERVAL: 418 MS
QTC CALCULATION(BAZETT): 463 MS
QTC FREDERICIA: 448 MS
R AXIS: -31 DEGREES
RBC # BLD AUTO: 4.59 X10*6/UL (ref 4–5.2)
RBC # UR STRIP.AUTO: ABNORMAL /UL
RBC #/AREA URNS AUTO: ABNORMAL /HPF
RH FACTOR (ANTIGEN D): NORMAL
SODIUM SERPL-SCNC: 141 MMOL/L (ref 136–145)
SP GR UR STRIP.AUTO: 1.02
SQUAMOUS #/AREA URNS AUTO: ABNORMAL /HPF
T AXIS: -11 DEGREES
T OFFSET: 421 MS
UROBILINOGEN UR STRIP.AUTO-MCNC: NORMAL MG/DL
VENTRICULAR RATE: 74 BPM
WBC # BLD AUTO: 12.7 X10*3/UL (ref 4.4–11.3)
WBC #/AREA URNS AUTO: >50 /HPF
WBC CLUMPS #/AREA URNS AUTO: ABNORMAL /HPF

## 2024-08-02 PROCEDURE — 82550 ASSAY OF CK (CPK): CPT | Performed by: STUDENT IN AN ORGANIZED HEALTH CARE EDUCATION/TRAINING PROGRAM

## 2024-08-02 PROCEDURE — 2500000004 HC RX 250 GENERAL PHARMACY W/ HCPCS (ALT 636 FOR OP/ED): Performed by: STUDENT IN AN ORGANIZED HEALTH CARE EDUCATION/TRAINING PROGRAM

## 2024-08-02 PROCEDURE — 99222 1ST HOSP IP/OBS MODERATE 55: CPT

## 2024-08-02 PROCEDURE — 70450 CT HEAD/BRAIN W/O DYE: CPT

## 2024-08-02 PROCEDURE — 1100000001 HC PRIVATE ROOM DAILY

## 2024-08-02 PROCEDURE — 81003 URINALYSIS AUTO W/O SCOPE: CPT | Performed by: STUDENT IN AN ORGANIZED HEALTH CARE EDUCATION/TRAINING PROGRAM

## 2024-08-02 PROCEDURE — 72170 X-RAY EXAM OF PELVIS: CPT

## 2024-08-02 PROCEDURE — 2500000002 HC RX 250 W HCPCS SELF ADMINISTERED DRUGS (ALT 637 FOR MEDICARE OP, ALT 636 FOR OP/ED)

## 2024-08-02 PROCEDURE — 70450 CT HEAD/BRAIN W/O DYE: CPT | Performed by: RADIOLOGY

## 2024-08-02 PROCEDURE — 36415 COLL VENOUS BLD VENIPUNCTURE: CPT | Performed by: STUDENT IN AN ORGANIZED HEALTH CARE EDUCATION/TRAINING PROGRAM

## 2024-08-02 PROCEDURE — 73552 X-RAY EXAM OF FEMUR 2/>: CPT | Mod: LEFT SIDE | Performed by: RADIOLOGY

## 2024-08-02 PROCEDURE — 85025 COMPLETE CBC W/AUTO DIFF WBC: CPT | Performed by: STUDENT IN AN ORGANIZED HEALTH CARE EDUCATION/TRAINING PROGRAM

## 2024-08-02 PROCEDURE — 73552 X-RAY EXAM OF FEMUR 2/>: CPT | Mod: LT

## 2024-08-02 PROCEDURE — 96361 HYDRATE IV INFUSION ADD-ON: CPT

## 2024-08-02 PROCEDURE — 71045 X-RAY EXAM CHEST 1 VIEW: CPT | Mod: FOREIGN READ | Performed by: RADIOLOGY

## 2024-08-02 PROCEDURE — 84075 ASSAY ALKALINE PHOSPHATASE: CPT | Performed by: STUDENT IN AN ORGANIZED HEALTH CARE EDUCATION/TRAINING PROGRAM

## 2024-08-02 PROCEDURE — 87186 SC STD MICRODIL/AGAR DIL: CPT | Mod: GEALAB | Performed by: STUDENT IN AN ORGANIZED HEALTH CARE EDUCATION/TRAINING PROGRAM

## 2024-08-02 PROCEDURE — 86901 BLOOD TYPING SEROLOGIC RH(D): CPT | Performed by: STUDENT IN AN ORGANIZED HEALTH CARE EDUCATION/TRAINING PROGRAM

## 2024-08-02 PROCEDURE — 82947 ASSAY GLUCOSE BLOOD QUANT: CPT

## 2024-08-02 PROCEDURE — 72170 X-RAY EXAM OF PELVIS: CPT | Mod: FOREIGN READ | Performed by: RADIOLOGY

## 2024-08-02 PROCEDURE — 2500000001 HC RX 250 WO HCPCS SELF ADMINISTERED DRUGS (ALT 637 FOR MEDICARE OP)

## 2024-08-02 PROCEDURE — 72192 CT PELVIS W/O DYE: CPT | Mod: FOREIGN READ | Performed by: SPECIALIST

## 2024-08-02 PROCEDURE — 71045 X-RAY EXAM CHEST 1 VIEW: CPT

## 2024-08-02 PROCEDURE — 84484 ASSAY OF TROPONIN QUANT: CPT | Performed by: STUDENT IN AN ORGANIZED HEALTH CARE EDUCATION/TRAINING PROGRAM

## 2024-08-02 PROCEDURE — 96374 THER/PROPH/DIAG INJ IV PUSH: CPT | Mod: 59

## 2024-08-02 PROCEDURE — 83605 ASSAY OF LACTIC ACID: CPT | Performed by: STUDENT IN AN ORGANIZED HEALTH CARE EDUCATION/TRAINING PROGRAM

## 2024-08-02 PROCEDURE — 96360 HYDRATION IV INFUSION INIT: CPT

## 2024-08-02 PROCEDURE — 85610 PROTHROMBIN TIME: CPT | Performed by: STUDENT IN AN ORGANIZED HEALTH CARE EDUCATION/TRAINING PROGRAM

## 2024-08-02 PROCEDURE — 72192 CT PELVIS W/O DYE: CPT

## 2024-08-02 PROCEDURE — 72125 CT NECK SPINE W/O DYE: CPT

## 2024-08-02 PROCEDURE — 99285 EMERGENCY DEPT VISIT HI MDM: CPT | Mod: 25

## 2024-08-02 PROCEDURE — 2500000004 HC RX 250 GENERAL PHARMACY W/ HCPCS (ALT 636 FOR OP/ED)

## 2024-08-02 PROCEDURE — 2500000005 HC RX 250 GENERAL PHARMACY W/O HCPCS

## 2024-08-02 PROCEDURE — 93005 ELECTROCARDIOGRAM TRACING: CPT

## 2024-08-02 PROCEDURE — 72125 CT NECK SPINE W/O DYE: CPT | Performed by: RADIOLOGY

## 2024-08-02 PROCEDURE — 83735 ASSAY OF MAGNESIUM: CPT | Performed by: STUDENT IN AN ORGANIZED HEALTH CARE EDUCATION/TRAINING PROGRAM

## 2024-08-02 RX ORDER — INSULIN LISPRO 100 [IU]/ML
0-10 INJECTION, SOLUTION INTRAVENOUS; SUBCUTANEOUS
Status: DISCONTINUED | OUTPATIENT
Start: 2024-08-02 | End: 2024-08-05 | Stop reason: HOSPADM

## 2024-08-02 RX ORDER — ATORVASTATIN CALCIUM 20 MG/1
20 TABLET, FILM COATED ORAL NIGHTLY
Status: DISCONTINUED | OUTPATIENT
Start: 2024-08-02 | End: 2024-08-05 | Stop reason: HOSPADM

## 2024-08-02 RX ORDER — LIDOCAINE 560 MG/1
1 PATCH PERCUTANEOUS; TOPICAL; TRANSDERMAL DAILY
Status: DISCONTINUED | OUTPATIENT
Start: 2024-08-02 | End: 2024-08-05 | Stop reason: HOSPADM

## 2024-08-02 RX ORDER — OXYCODONE HYDROCHLORIDE 10 MG/1
10 TABLET ORAL EVERY 6 HOURS PRN
Status: DISCONTINUED | OUTPATIENT
Start: 2024-08-02 | End: 2024-08-05 | Stop reason: HOSPADM

## 2024-08-02 RX ORDER — DEXTROSE 50 % IN WATER (D50W) INTRAVENOUS SYRINGE
25
Status: DISCONTINUED | OUTPATIENT
Start: 2024-08-02 | End: 2024-08-05 | Stop reason: HOSPADM

## 2024-08-02 RX ORDER — DULOXETIN HYDROCHLORIDE 60 MG/1
60 CAPSULE, DELAYED RELEASE ORAL DAILY
Status: DISCONTINUED | OUTPATIENT
Start: 2024-08-02 | End: 2024-08-05 | Stop reason: HOSPADM

## 2024-08-02 RX ORDER — CYCLOBENZAPRINE HCL 5 MG
5 TABLET ORAL NIGHTLY
Status: DISCONTINUED | OUTPATIENT
Start: 2024-08-02 | End: 2024-08-05 | Stop reason: HOSPADM

## 2024-08-02 RX ORDER — AMLODIPINE BESYLATE 5 MG/1
5 TABLET ORAL DAILY
Status: DISCONTINUED | OUTPATIENT
Start: 2024-08-02 | End: 2024-08-05 | Stop reason: HOSPADM

## 2024-08-02 RX ORDER — INSULIN LISPRO 100 [IU]/ML
5 INJECTION, SOLUTION INTRAVENOUS; SUBCUTANEOUS
Status: DISCONTINUED | OUTPATIENT
Start: 2024-08-02 | End: 2024-08-05 | Stop reason: HOSPADM

## 2024-08-02 RX ORDER — HEPARIN SODIUM 5000 [USP'U]/ML
5000 INJECTION, SOLUTION INTRAVENOUS; SUBCUTANEOUS EVERY 8 HOURS
Status: DISCONTINUED | OUTPATIENT
Start: 2024-08-02 | End: 2024-08-05 | Stop reason: HOSPADM

## 2024-08-02 RX ORDER — BROMFENAC 1.03 MG/ML
1 SOLUTION/ DROPS OPHTHALMIC 2 TIMES DAILY
COMMUNITY

## 2024-08-02 RX ORDER — SODIUM CHLORIDE 9 MG/ML
100 INJECTION, SOLUTION INTRAVENOUS CONTINUOUS
Status: DISCONTINUED | OUTPATIENT
Start: 2024-08-02 | End: 2024-08-03

## 2024-08-02 RX ORDER — ENOXAPARIN SODIUM 100 MG/ML
40 INJECTION SUBCUTANEOUS EVERY 24 HOURS
Status: CANCELLED | OUTPATIENT
Start: 2024-08-02

## 2024-08-02 RX ORDER — NAPROXEN SODIUM 220 MG/1
81 TABLET, FILM COATED ORAL DAILY
Status: DISCONTINUED | OUTPATIENT
Start: 2024-08-02 | End: 2024-08-05 | Stop reason: HOSPADM

## 2024-08-02 RX ORDER — POLYETHYLENE GLYCOL 3350 17 G/17G
17 POWDER, FOR SOLUTION ORAL DAILY
Status: DISCONTINUED | OUTPATIENT
Start: 2024-08-02 | End: 2024-08-05 | Stop reason: HOSPADM

## 2024-08-02 RX ORDER — INSULIN GLARGINE 100 [IU]/ML
10 INJECTION, SOLUTION SUBCUTANEOUS ONCE
Status: COMPLETED | OUTPATIENT
Start: 2024-08-02 | End: 2024-08-03

## 2024-08-02 RX ORDER — ACETAMINOPHEN 325 MG/1
975 TABLET ORAL EVERY 6 HOURS
Status: DISCONTINUED | OUTPATIENT
Start: 2024-08-02 | End: 2024-08-05 | Stop reason: HOSPADM

## 2024-08-02 RX ORDER — DEXTROSE 50 % IN WATER (D50W) INTRAVENOUS SYRINGE
12.5
Status: DISCONTINUED | OUTPATIENT
Start: 2024-08-02 | End: 2024-08-05 | Stop reason: HOSPADM

## 2024-08-02 RX ORDER — INSULIN GLARGINE 100 [IU]/ML
20 INJECTION, SOLUTION SUBCUTANEOUS 2 TIMES DAILY
Status: DISCONTINUED | OUTPATIENT
Start: 2024-08-02 | End: 2024-08-05 | Stop reason: HOSPADM

## 2024-08-02 RX ORDER — OXYCODONE HYDROCHLORIDE 5 MG/1
5 TABLET ORAL EVERY 6 HOURS PRN
Status: DISCONTINUED | OUTPATIENT
Start: 2024-08-02 | End: 2024-08-05 | Stop reason: HOSPADM

## 2024-08-02 RX ORDER — CEFTRIAXONE 2 G/50ML
2 INJECTION, SOLUTION INTRAVENOUS ONCE
Status: COMPLETED | OUTPATIENT
Start: 2024-08-02 | End: 2024-08-02

## 2024-08-02 RX ORDER — GABAPENTIN 300 MG/1
600 CAPSULE ORAL EVERY MORNING
Status: DISCONTINUED | OUTPATIENT
Start: 2024-08-03 | End: 2024-08-05 | Stop reason: HOSPADM

## 2024-08-02 SDOH — SOCIAL STABILITY: SOCIAL INSECURITY: ABUSE: ADULT

## 2024-08-02 SDOH — SOCIAL STABILITY: SOCIAL INSECURITY: HAVE YOU HAD ANY THOUGHTS OF HARMING ANYONE ELSE?: NO

## 2024-08-02 SDOH — SOCIAL STABILITY: SOCIAL INSECURITY: DO YOU FEEL ANYONE HAS EXPLOITED OR TAKEN ADVANTAGE OF YOU FINANCIALLY OR OF YOUR PERSONAL PROPERTY?: NO

## 2024-08-02 SDOH — SOCIAL STABILITY: SOCIAL INSECURITY: DOES ANYONE TRY TO KEEP YOU FROM HAVING/CONTACTING OTHER FRIENDS OR DOING THINGS OUTSIDE YOUR HOME?: NO

## 2024-08-02 SDOH — SOCIAL STABILITY: SOCIAL INSECURITY: ARE YOU OR HAVE YOU BEEN THREATENED OR ABUSED PHYSICALLY, EMOTIONALLY, OR SEXUALLY BY ANYONE?: NO

## 2024-08-02 SDOH — SOCIAL STABILITY: SOCIAL INSECURITY: HAVE YOU HAD THOUGHTS OF HARMING ANYONE ELSE?: NO

## 2024-08-02 SDOH — SOCIAL STABILITY: SOCIAL INSECURITY: DO YOU FEEL UNSAFE GOING BACK TO THE PLACE WHERE YOU ARE LIVING?: NO

## 2024-08-02 SDOH — SOCIAL STABILITY: SOCIAL INSECURITY: HAS ANYONE EVER THREATENED TO HURT YOUR FAMILY OR YOUR PETS?: NO

## 2024-08-02 SDOH — SOCIAL STABILITY: SOCIAL INSECURITY: ARE THERE ANY APPARENT SIGNS OF INJURIES/BEHAVIORS THAT COULD BE RELATED TO ABUSE/NEGLECT?: NO

## 2024-08-02 SDOH — SOCIAL STABILITY: SOCIAL INSECURITY: WERE YOU ABLE TO COMPLETE ALL THE BEHAVIORAL HEALTH SCREENINGS?: YES

## 2024-08-02 ASSESSMENT — COGNITIVE AND FUNCTIONAL STATUS - GENERAL
STANDING UP FROM CHAIR USING ARMS: A LOT
DAILY ACTIVITIY SCORE: 17
MOVING FROM LYING ON BACK TO SITTING ON SIDE OF FLAT BED WITH BEDRAILS: A LITTLE
DAILY ACTIVITIY SCORE: 18
MOBILITY SCORE: 14
CLIMB 3 TO 5 STEPS WITH RAILING: A LOT
MOVING FROM LYING ON BACK TO SITTING ON SIDE OF FLAT BED WITH BEDRAILS: A LITTLE
HELP NEEDED FOR BATHING: A LOT
MOVING TO AND FROM BED TO CHAIR: A LOT
TURNING FROM BACK TO SIDE WHILE IN FLAT BAD: A LITTLE
EATING MEALS: A LITTLE
TOILETING: A LOT
DRESSING REGULAR LOWER BODY CLOTHING: A LITTLE
MOVING TO AND FROM BED TO CHAIR: A LOT
PATIENT BASELINE BEDBOUND: NO
TOILETING: A LITTLE
HELP NEEDED FOR BATHING: A LITTLE
TURNING FROM BACK TO SIDE WHILE IN FLAT BAD: A LITTLE
PERSONAL GROOMING: A LITTLE
DRESSING REGULAR UPPER BODY CLOTHING: A LITTLE
STANDING UP FROM CHAIR USING ARMS: A LOT
WALKING IN HOSPITAL ROOM: A LOT
DRESSING REGULAR UPPER BODY CLOTHING: A LITTLE
MOBILITY SCORE: 14
WALKING IN HOSPITAL ROOM: A LOT
CLIMB 3 TO 5 STEPS WITH RAILING: A LOT
DRESSING REGULAR LOWER BODY CLOTHING: A LOT

## 2024-08-02 ASSESSMENT — PATIENT HEALTH QUESTIONNAIRE - PHQ9
2. FEELING DOWN, DEPRESSED OR HOPELESS: NOT AT ALL
1. LITTLE INTEREST OR PLEASURE IN DOING THINGS: NOT AT ALL
SUM OF ALL RESPONSES TO PHQ9 QUESTIONS 1 & 2: 0

## 2024-08-02 ASSESSMENT — PAIN DESCRIPTION - PAIN TYPE: TYPE: ACUTE PAIN

## 2024-08-02 ASSESSMENT — LIFESTYLE VARIABLES
SKIP TO QUESTIONS 9-10: 1
AUDIT-C TOTAL SCORE: 0
AUDIT-C TOTAL SCORE: 0
HOW OFTEN DO YOU HAVE 6 OR MORE DRINKS ON ONE OCCASION: NEVER
HOW MANY STANDARD DRINKS CONTAINING ALCOHOL DO YOU HAVE ON A TYPICAL DAY: PATIENT DOES NOT DRINK
HOW OFTEN DO YOU HAVE A DRINK CONTAINING ALCOHOL: NEVER

## 2024-08-02 ASSESSMENT — ACTIVITIES OF DAILY LIVING (ADL)
HEARING - LEFT EAR: FUNCTIONAL
DRESSING YOURSELF: NEEDS ASSISTANCE
ADEQUATE_TO_COMPLETE_ADL: YES
JUDGMENT_ADEQUATE_SAFELY_COMPLETE_DAILY_ACTIVITIES: YES
GROOMING: NEEDS ASSISTANCE
TOILETING: NEEDS ASSISTANCE
HEARING - RIGHT EAR: FUNCTIONAL
BATHING: NEEDS ASSISTANCE
ASSISTIVE_DEVICE: WALKER
WALKS IN HOME: NEEDS ASSISTANCE
FEEDING YOURSELF: NEEDS ASSISTANCE
PATIENT'S MEMORY ADEQUATE TO SAFELY COMPLETE DAILY ACTIVITIES?: YES
LACK_OF_TRANSPORTATION: NO

## 2024-08-02 ASSESSMENT — PAIN - FUNCTIONAL ASSESSMENT: PAIN_FUNCTIONAL_ASSESSMENT: 0-10

## 2024-08-02 ASSESSMENT — PAIN DESCRIPTION - LOCATION: LOCATION: HIP

## 2024-08-02 ASSESSMENT — COLUMBIA-SUICIDE SEVERITY RATING SCALE - C-SSRS
2. HAVE YOU ACTUALLY HAD ANY THOUGHTS OF KILLING YOURSELF?: NO
1. IN THE PAST MONTH, HAVE YOU WISHED YOU WERE DEAD OR WISHED YOU COULD GO TO SLEEP AND NOT WAKE UP?: NO
6. HAVE YOU EVER DONE ANYTHING, STARTED TO DO ANYTHING, OR PREPARED TO DO ANYTHING TO END YOUR LIFE?: NO

## 2024-08-02 ASSESSMENT — PAIN SCALES - GENERAL: PAINLEVEL_OUTOF10: 7

## 2024-08-02 ASSESSMENT — PAIN DESCRIPTION - ORIENTATION: ORIENTATION: LEFT

## 2024-08-02 NOTE — SIGNIFICANT EVENT
Senior note:    82yo F with pmhx of HTN, DM, CKD, DJD, adrenal/renal mass here for mechanical fall on L hip. Walks with roller, lives alone. No LOC or prodromal sxs such as presyncope/syncope, chest pain or dyspnea. Laid on ground for 9 hrs and got up this morning with severe pain in hip, hard to bear weight. Denies current sxs except for pain and denies urinary sxs.     #mechanical fall   -no fx on CT  -pain control, PT/OT     #ROCKY likely dehydration with possible component of rhabdo   #leukocytosis likely reactive from above   -IVF, CTM CK. Electrolytes wnl   -hold home losartan     #asymptomatic pyuria   -got rocephin in ED, will dc for now     #DM, HTN, HLD   -home insulin reg plus SSI   -amlodipine, statin     #hx of renal/adrenal mass   -defer to outpatient for now    Dvtppx: heparin sub q

## 2024-08-02 NOTE — H&P
"HPI    HPI: Mariaelena Ruiz is a 83 y.o. female who presented to the hospital for   Chief Complaint   Patient presents with    Fall    Hip Pain     Mariaelena Ruiz is a 83 y.o. female with PMHx insulin-dependent T2DM (A1c 7.7 [5/2024]), HTN, HLD, CKD, and adrenal/renal mass who presented to the hospital after a fall in her home yesterday morning. After making breakfast, she walked over uneven alex from the dining room to the living room using her rollator and fell. She crawled to her chair but could not manage to climb onto it and was down for nine hours before her sound found her. She does not recall any precipitating symptoms, stating \"I just went down, it happened so fast.\" Did not have light-headedness, dizziness, or LOC preceding the fall. Patient does not remember hitting her head, but notes she has new bruising on the left side of her face. Her son placed her in her chair and she was able to sleep comfortably. This morning she woke up and when she attempted to walk, had severe pain in her left \"waist and hip area.\" Rates the pain a 9/10 when placing weight on her leg, but only a 1/10 at rest. Did not notice any bruising. Denies urinary symptoms, including dysuria, frequency, and hematuria. Denies fevers, chills, chest pain, SOB, abdominal pain.     Patient lives at home by herself but her son is there at least every other day. Uses a rollator at home at baseline.     ED course: Temp 97.5, HR 79, RR 19, BP 95/66, spO2 98 on RA    Vitals: Temp     Labs:   -CBC - WBC 12.7 (baselineish), RB 4.59, Hgb 13.0, Hct40.9, MCV 89, Platelet 238, PTT 11.9, INR 1.1  -CMP - Glucose 262 , Na 141, K 3.9, Cl 106, HCO3 , BUN 39, Cr 1.62 (baseline 1-1.3), Ca 8.9, Alk Phos 91, ALT 25, AST 62  -Creatinine kinase: 1953  -Lactate: 2.4  -Troponin: 97 --> 77  -UA - Color yellow, Specific Gravity 1.018,  pH 5.0, Protein 30, Glucose 500 (3+), Blood 0.2 (2+) Ketones neg, Bilirubin neg, Nitrite 2+, Leukocyte Esterase 500,  WBC >50, " RBC 3-5,  Bacteria 4+    Imaging:   - CT head: no evidence of acute infarct or hemorrhage  - CT c-spine wo IV contrast: no CT evidence of acute fracture  - CXR: no acute traumatic abnormalities  - CT cervical spine: no CT evidence of acute fracture  - XR pelvis: no acute bony abnormalities  - XR femur: no acute traumatic abnormality  - CT pelvis wo contrast: No acute fractures subluxation or osseous lesions.    Interventions: 2L IVF, ceftriaxone    ROS: 12 points review of system is negative except as stated in the HPI above.   Past Medical History     Past Medical History:   Diagnosis Date    Acute cystitis without hematuria 08/19/2019    Acute cystitis without hematuria    Body mass index (BMI)40.0-44.9, adult 09/30/2021    Body mass index (BMI) of 40.0 to 44.9 in adult    Epidermal cyst 01/06/2022    Epidermal cyst    Hyperglycemia, unspecified     Hemoglobin A1c between 7.0% and 9.0%    Hypertensive chronic kidney disease with stage 1 through stage 4 chronic kidney disease, or unspecified chronic kidney disease 06/17/2021    Benign hypertension with chronic kidney disease, stage II    Incontinence without sensory awareness 06/19/2017    Urinary incontinence without sensory awareness    Local infection of the skin and subcutaneous tissue, unspecified 12/19/2018    Staph skin infection    Morbid (severe) obesity due to excess calories (Multi) 01/07/2022    Class 3 severe obesity due to excess calories with serious comorbidity and body mass index (BMI) of 45.0 to 49.9 in adult    Morbid (severe) obesity due to excess calories (Multi) 06/18/2021    Class 3 severe obesity with serious comorbidity and body mass index (BMI) of 40.0 to 44.9 in adult, unspecified obesity type    Morbid (severe) obesity due to excess calories (Multi) 09/30/2021    Class 3 severe obesity with serious comorbidity in adult    Other conditions influencing health status 11/17/2013    Diabetes With Diabetic Amyotrophy    Other conditions  influencing health status 01/09/2015    Lumbar Neuritis L2 - L3    Other conditions influencing health status 11/17/2013    Lumbar Neuritis L3 - L4    Other specified symptoms and signs involving the circulatory and respiratory systems 02/03/2020    Bruit of left carotid artery    Pain in left hip 01/22/2014    Left hip pain    Personal history of diseases of the skin and subcutaneous tissue     History of rosacea    Personal history of other diseases of the circulatory system     History of hypertension    Personal history of other diseases of the musculoskeletal system and connective tissue     History of osteoarthritis    Personal history of other diseases of the nervous system and sense organs     History of retinopathy    Personal history of other diseases of the respiratory system     History of bronchitis    Personal history of other endocrine, nutritional and metabolic disease     History of type 2 diabetes mellitus    Personal history of other endocrine, nutritional and metabolic disease     History of hyperlipidemia    Personal history of other endocrine, nutritional and metabolic disease     History of obesity    Personal history of other endocrine, nutritional and metabolic disease     History of diabetic retinopathy    Personal history of other infectious and parasitic diseases     History of onychomycosis    Personal history of other infectious and parasitic diseases     History of candidiasis    Personal history of other medical treatment     History of stress test    Personal history of other specified conditions 01/17/2018    History of fatigue    Personal history of other specified conditions     History of fatigue    Personal history of transient ischemic attack (TIA), and cerebral infarction without residual deficits     History of transient cerebral ischemia    Traumatic ischemia of muscle, subsequent encounter 05/02/2020    Traumatic rhabdomyolysis, subsequent encounter      Surgical History      Past Surgical History:   Procedure Laterality Date    BUNIONECTOMY  02/08/2017    Simple Bunion Exostectomy ([facility] Procedure)    COLONOSCOPY  02/08/2017    Colonoscopy    CT GUIDED PERCUTANEOUS BIOPSY BONE  7/18/2013    CT GUIDED PERCUTANEOUS BIOPSY BONE 7/18/2013 [facility]GAC[facility]    CT GUIDED PERCUTANEOUS BIOPSY BONE DEEP  11/15/2013    CT GUIDED PERCUTANEOUS BIOPSY BONE DEEP 11/15/2013 [facility]    CYSTOSCOPY  02/08/2017    Diagnostic Cystoscopy    HIP SURGERY  02/08/2017    Hip Surgery    MR HEAD ANGIO WO IV CONTRAST  8/9/2014    MR HEAD ANGIO WO IV CONTRAST 8/9/2014 [facility]ICAL LEGAC[facility]    OTHER SURGICAL HISTORY  08/07/2013    Laminectomy Decompress, Facetectomy, Foraminotomy Cervic Seg    OTHER SURGICAL HISTORY  02/08/2017    Tunneled (Catheter) Central IV Line Broviac / Arroyo    OTHER SURGICAL HISTORY  02/08/2017    Remote Analysis Of Technical Componenent Of Implantable Loop Recorder     Family History   No family history on file.  Social History     Social History     Socioeconomic History    Marital status:      Spouse name: Not on file    Number of children: Not on file    Years of education: Not on file    Highest education level: Not on file   Occupational History    Not on file   Tobacco Use    Smoking status: Never    Smokeless tobacco: Never   Vaping Use    Vaping status: Never Used   Substance and Sexual Activity    Alcohol use: Never    Drug use: Never    Sexual activity: Not on file   Other Topics Concern    Not on file   Social History Narrative    Not on file     Social Determinants of Health     Financial Resource Strain: Low Risk  (8/2/2024)    Overall Financial Resource Strain (CARDIA)     Difficulty of Paying Living Expenses: Not hard at all   Food Insecurity: Not on file   Transportation Needs: No Transportation Needs (8/2/2024)    PRAPARE - Transportation     Lack of Transportation (Medical): No     Lack of Transportation (Non-Medical): No   Physical Activity:  "Not on file   Stress: Not on file   Social Connections: Feeling Socially Integrated (1/5/2024)    OASIS : Social Isolation     Frequency of experiencing loneliness or isolation: Never   Intimate Partner Violence: Not on file   Housing Stability: Low Risk  (8/2/2024)    Housing Stability Vital Sign     Unable to Pay for Housing in the Last Year: No     Number of Times Moved in the Last Year: 1     Homeless in the Last Year: No       Tobacco Use: Low Risk  (8/2/2024)    Patient History     Smoking Tobacco Use: Never     Smokeless Tobacco Use: Never     Passive Exposure: Not on file        Social History     Substance and Sexual Activity   Alcohol Use Never      Allergies   No Known Allergies   Meds    Scheduled medications  acetaminophen, 975 mg, oral, q6h  amLODIPine, 5 mg, oral, Daily  aspirin, 81 mg, oral, Daily  atorvastatin, 20 mg, oral, Nightly  cyclobenzaprine, 5 mg, oral, Nightly  DULoxetine, 60 mg, oral, Daily  [START ON 8/3/2024] gabapentin, 600 mg, oral, q AM  heparin (porcine), 5,000 Units, subcutaneous, q8h  insulin glargine, 20 Units, subcutaneous, BID  insulin lispro, 0-10 Units, subcutaneous, TID  insulin lispro, 5 Units, subcutaneous, BID  lidocaine, 1 patch, transdermal, Daily  polyethylene glycol, 17 g, oral, Daily      Continuous medications  sodium chloride 0.9%, 100 mL/hr, Last Rate: 100 mL/hr (08/02/24 1705)      PRN medications  PRN medications: dextrose, dextrose, dextrose, dextrose, glucagon, glucagon, glucagon, glucagon, oxyCODONE, oxyCODONE   Objective     Vitals  Visit Vitals  /88 (BP Location: Left arm, Patient Position: Lying)   Pulse 95   Temp 36.4 °C (97.5 °F) (Temporal)   Resp 18   Ht 1.676 m (5' 5.98\")   Wt 98 kg (216 lb)   SpO2 94%   BMI 34.88 kg/m²   Smoking Status Never   BSA 2.14 m²        Physical Examination:  Physical Exam  Constitutional:       General: She is not in acute distress.     Appearance: Normal appearance.   HENT:      Head: Abrasion present.      " Comments: Abrasion on left side of face  Eyes:      Extraocular Movements: Extraocular movements intact.      Pupils: Pupils are equal, round, and reactive to light.   Cardiovascular:      Rate and Rhythm: Normal rate and regular rhythm.      Pulses: Normal pulses.      Heart sounds: Normal heart sounds.   Pulmonary:      Effort: Pulmonary effort is normal. No respiratory distress.      Breath sounds: Normal breath sounds.   Abdominal:      General: Bowel sounds are normal. There is no distension.      Tenderness: There is no abdominal tenderness.   Musculoskeletal:         General: No deformity.      Comments: No bruising or obvious deformity in left hip   Neurological:      General: No focal deficit present.      Mental Status: She is alert and oriented to person, place, and time.   Psychiatric:         Mood and Affect: Mood normal.         Behavior: Behavior normal.          I/Os    Intake/Output Summary (Last 24 hours) at 8/2/2024 1734  Last data filed at 8/2/2024 1641  Gross per 24 hour   Intake 2098 ml   Output --   Net 2098 ml       Vent Settings         Labs:   Results from last 72 hours   Lab Units 08/02/24  1115   SODIUM mmol/L 141   POTASSIUM mmol/L 3.9   CHLORIDE mmol/L 106   CO2 mmol/L 26   BUN mg/dL 39*   CREATININE mg/dL 1.62*   GLUCOSE mg/dL 262*   CALCIUM mg/dL 8.9   ANION GAP mmol/L 13   EGFR mL/min/1.73m*2 31*      Results from last 72 hours   Lab Units 08/02/24  1115   WBC AUTO x10*3/uL 12.7*   HEMOGLOBIN g/dL 13.0   HEMATOCRIT % 40.9   PLATELETS AUTO x10*3/uL 238   NEUTROS PCT AUTO % 81.2   LYMPHS PCT AUTO % 8.4   MONOS PCT AUTO % 7.3   EOS PCT AUTO % 2.0      Lab Results   Component Value Date    CALCIUM 8.9 08/02/2024    PHOS 2.8 05/18/2024      Lab Results   Component Value Date    CRP 0.84 01/06/2022      Micro/ID:   Susceptibility data from last 90 days.  Collected Specimen Info Organism Amoxicillin/Clavulanate Ampicillin Ampicillin/Sulbactam Cefazolin Cefazolin (uncomplicated UTIs only)  "Ciprofloxacin Gentamicin Nitrofurantoin Piperacillin/Tazobactam Trimethoprim/Sulfamethoxazole   05/15/24 Urine from Clean Catch/Voided Klebsiella pneumoniae/variicola  S  R  S  S  S  S  S  S  S  S                    No lab exists for component: \"AGALPCRNB\"     Lab Results   Component Value Date    URINECULTURE >100,000 Klebsiella pneumoniae/variicola (A) 05/15/2024     Images    CT pelvis wo IV contrast  Narrative: STUDY:  CT Pelvis without IV Contrast; 8/2/24 at 2:35 PM  INDICATION:  Left hip pain.  Prior ORIF right hip.  COMPARISON:  Pelvic/left femur XR earlier same date, CT AP 3/1/24.  ACCESSION NUMBER(S):  [id number]  ORDERING CLINICIAN:  SHAMEKA PATEL  TECHNIQUE:  Thin section axial images were obtained through the pelvis  without intravenous contrast.  Orthogonal reconstructed images were  obtained and reviewed.    Automated mA/kV exposure control was utilized and patient examination  was performed in strict accordance with principles of ALARA.  FINDINGS:  Multi planar imaging through the pelvis was performed.  Patient is  status post ORIF of proximal right femur with intramedullary jaylyn and a  proximal nail.  This is unchanged in alignment.  There is moderate  degenerative changes of the hips.  No acute fractures subluxation or  osseous lesions.  No evidence of AVN.  Old anterior wedge compression  fracture L4 and L5 are stable.  Limited views of the bladder unremarkable.  Visualized GI tract images  normally.  Overlying soft tissues are unremarkable.  Impression: 1.  No acute fractures subluxation or osseous lesions.  2.  Degenerative changes affect the hips  3.  Degenerative changes affect the visualized lumbar spine.  Old  compression fracture at L4 and L5 are noted.  Signed by Sampson Esparza MD  XR pelvis 1-2 views  Narrative: STUDY:  Pelvis Radiographs; 8/2/2024 at 12:17 PM  INDICATION:  Fall.  COMPARISON:  None Available.  ACCESSION NUMBER(S):  [id number]  ORDERING CLINICIAN:  SHAMEKA" AMANDA  TECHNIQUE:  One view(s) of the pelvis.  FINDINGS:    The pelvic ring is intact.  There is no acute fracture.  There are  degenerative changes in the included lower lumbar spine with possible  lower lumbar laminectomy.  There are degenerative changes of the  sacroiliac joints.  Post-ORIF changes of the proximal right femur are  included in part.  Degenerative changes are noted in the left hip.   Multiple pelvic calcifications most likely represent phleboliths.   Tubing projects over the right pelvis.  Impression: No acute bony abnormalities on x-ray examination of the AP pelvis.  Degenerative and post-ORIF changes.  Signed by Dorys Jorge DO  XR femur left 2+ views  Narrative: STUDY:  Femur Radiographs; 8/2/2024 at 12:17 PM  INDICATION:  Fall.  COMPARISON:  None Available.  ACCESSION NUMBER(S):  [id number]  ORDERING CLINICIAN:  SHAMEKA PATEL  TECHNIQUE:  Two view(s) (four images) of the left femur.  FINDINGS:    There is no displaced fracture.  The alignment is anatomic.  There is  mild narrowing of the hip joint with mild adjacent osteophyte  formation.  More marked joint space narrowing is noted at the knee  with patchy bony demineralization and scattered foci of sclerosis  which are nonspecific.  Soft tissue calcifications noted adjacent to  the greater trochanter.  Pelvic calcifications appear compatible with  phleboliths.  Impression: 1.No acute traumatic bony abnormalities of the left femur.  2.Patchy bony demineralization and mild degenerative joint  disease of the left hip and moderate degenerative joint disease of the  knee.  Signed by Dorys Jorge DO  XR chest 1 view  Narrative: STUDY:  Chest Radiograph;  8/2/2024 at 12:17 PM  INDICATION:  Fall.  COMPARISON:  None Available  ACCESSION NUMBER(S):  [id number]  ORDERING CLINICIAN:  SHAMEKA PATEL  TECHNIQUE:  Frontal chest was obtained at 1216 hours.  FINDINGS:  CARDIOMEDIASTINAL SILHOUETTE:  Cardiomediastinal silhouette is normal in size and  configuration.     LUNGS:  Lungs are clear.  There is no dense consolidation, pleural effusion,  or pneumothorax.     ABDOMEN:  No remarkable upper abdominal findings.     BONES:  No acute osseous changes.  Degenerative changes are noted in the  shoulders and spine.  Impression: Normal heart size with no radiographic signs of active pulmonary  parenchymal infiltration.  No acute traumatic abnormalities.  Signed by Dorys Jorge DO  ECG 12 lead  Normal sinus rhythm  Left axis deviation  Anterior infarct , age undetermined  Abnormal ECG  When compared with ECG of 21-MAR-2023 15:39,  Previous ECG has undetermined rhythm, needs review  Right bundle branch block is no longer Present  Anterior infarct is now Present  See ED provider note for full interpretation and clinical correlation  Confirmed by Melania Oconnell ([id number]) on 8/2/2024 12:36:51 PM  CT cervical spine wo IV contrast  Narrative: Interpreted By:  Ayush Macias,   STUDY:  CT CERVICAL SPINE WO IV CONTRAST; 8/2/2024 11:43 am      INDICATION:  Signs/Symptoms:fall.      COMPARISON:  None.      ACCESSION NUMBER(S):  [id number]      ORDERING CLINICIAN:  SHAMEKA PATEL      TECHNIQUE:  Contiguous axial CT images were obtained at  2 mm slice thickness  through the cervical spine without contrast administration. The  images were then reconstructed in the coronal and sagittal planes.      FINDINGS:  There is no CT evidence of acute fracture identified. No prevertebral  soft tissue swelling is identified.      ALIGNMENT:  There is minimal anterolisthesis of C2 on C3 and of C3 on C4 and  minimal retrolisthesis of C4 on C5.      VERTEBRAE/DISC SPACES:  Moderate to severe disc space narrowing and bulky, bridging,  osteophyte formation is seen at the C4-5, C5-6 and C6-7 levels.  Severe disc space narrowing and moderate bridging osteophytes are  present at the C3-4 level. Moderate to severe facet degenerative  changes are seen in the proximal cervical spine.      At  least moderate to severe central canal narrowing is seen at the  C4-5 and C6-7 levels. Neural foraminal narrowing is seen throughout  the cervical spine.      ADDITIONAL FINDINGS:  Evaluation of the visualized soft tissues of the neck is limited by  the lack of intravenous contrast.  Within this limitation, no gross  mass or lymphadenopathy is identified.      Impression: 1.  No CT evidence of acute fracture.  2.  Degenerative changes throughout the cervical spine, as above.      Signed by: Ayush Macias 8/2/2024 12:14 PM  Dictation workstation:   [id number]  CT head wo IV contrast  Narrative: Interpreted By:  Ayush Macias,   STUDY:  CT HEAD WO IV CONTRAST; 8/2/2024 11:43 am      INDICATION:  Signs/Symptoms:fall.      COMPARISON:  CT head dated 06/29/2022      ACCESSION NUMBER(S):  [id number]      ORDERING CLINICIAN:  SHAMEKA PATEL      TECHNIQUE:  Contiguous axial CT images were obtained through the head at 5 mm  slice thickness without contrast administration.      FINDINGS:  INTRACRANIAL:  The ventricles, sulci and basal cisterns are within normal limits for  size and configuration. The grey-white differentiation is intact.  There is no mass effect or midline shift. There is no extraaxial  fluid collection. There is no intracranial hemorrhage.  The calvarium  is unremarkable.      EXTRACRANIAL:  Visualized paranasal sinuses and mastoids are clear.      Impression: No evidence of acute cortical infarct or intracranial hemorrhage.      MACRO:  None          Signed by: Ayush Macias 8/2/2024 12:09 PM  Dictation workstation:   [id number]    Assessment and Plan    Mariaelena Ruiz is a 83 y.o. female PMHx insulin-dependent T2DM (A1c 7.7 [5/2024]), HTN, HLD, CKD and adrenal/renal mass who presented to the hospital after a mechanical fall on her L hip in her home without LOC or prodromal symptoms. Was found down after 9 hours - had severe left sided hip pain when attempting to bear weight. No fractures on  imaging, will work up ROCKY that is likely due to dehydration with possible rhabdo.    Acute Medical Issues   #ROCKY likely dehydration with possible component of rhabdo   #leukocytosis likely reactive from above   -IVF, CTM CK. Electrolytes wnl   -hold home losartan     #mechanical fall   -no fx on CT  -pain control, PT/OT     PT/OT  rocephin    Chronic Medical Issues   #DM  -Adult diet with carb control   -Monitor labs  -Lantus 20 with breakfast and dinner  -Mild SSI     #hx of renal/adrenal mass   -defer to outpatient for now    #CKD3a  -Monitor daily renal labs      #HTN  -cont amlodipine  -hold losartan     #HLD  -continue atorvastatin    F: PO intake & IVF PRN   E: Replete as needed   N: Regular diet  GI ppx: N/A  DVT ppx: Heparin  Antibiotics: ceftriaxone in ED  Tubes/Lines/Drains: none    Code Status: DNR and No Intubation   Emergency Contact: Extended Emergency Contact Information  Primary Emergency Contact: Randy Ruiz Jr.  Address: [address] [place] [address] [place]  Home Phone: [phone]  Work Phone: [phone]  Mobile Phone: [phone]  Relation: Son  Preferred language: English   needed? No  Secondary Emergency Contact: Obey Ruiz  Home Phone: [phone]  Work Phone: [phone]  Mobile Phone: [phone]  Relation: Son  Preferred language: English   needed? No     Disposition: 83 y.o.female admitted for mechanical fall with ROCKY. Estimated length of stay less than 48 hrs.     Toño Larsen MD  PGY-1 Transitional

## 2024-08-02 NOTE — ED PROVIDER NOTES
CC: Fall and Hip Pain     HPI:  Patient is an 83-year-old female with a history of insulin-dependent diabetes and hypertension who presents emergency department for fall.  She walks with a walker at baseline and was standing in the living room which is 1 step down from the dining room yesterday morning when she turned the wrong way and fell.  She adamantly denies lightheadedness, dizziness, chest pain or shortness of breath or near syncopal symptoms prior to the fall.  She states that she went the wrong way and fell landing on her left hip.  She states her son found her 9 hours later and was able to put her in her chair.  She had no pain while sitting still in the chair however this morning she went to get up to fix herself some breakfast and had severe pain in her left hip and could not put weight on it with trying to walk and therefore presented to the emergency department for evaluation.  She did hit her head yesterday.  She is not on anticoagulation.  No loss of consciousness, seizure-like activity or vomiting.  Glucose 73 for EMS today.    Records Reviewed:  Recent available ED and inpatient notes reviewed in EMR.    PMHx/PSHx:  Per HPI.   - has a past medical history of Acute cystitis without hematuria, Body mass index (BMI)40.0-44.9, adult, Epidermal cyst, Hyperglycemia, unspecified, Hypertensive chronic kidney disease with stage 1 through stage 4 chronic kidney disease, or unspecified chronic kidney disease, Incontinence without sensory awareness, Local infection of the skin and subcutaneous tissue, unspecified, Morbid (severe) obesity due to excess calories (Multi), Morbid (severe) obesity due to excess calories (Multi), Morbid (severe) obesity due to excess calories (Multi), Other conditions influencing health status, Other conditions influencing health status, Other conditions influencing health status, Other specified symptoms and signs involving the circulatory and respiratory systems, Pain in left hip,  Personal history of diseases of the skin and subcutaneous tissue, Personal history of other diseases of the circulatory system, Personal history of other diseases of the musculoskeletal system and connective tissue, Personal history of other diseases of the nervous system and sense organs, Personal history of other diseases of the respiratory system, Personal history of other endocrine, nutritional and metabolic disease, Personal history of other endocrine, nutritional and metabolic disease, Personal history of other endocrine, nutritional and metabolic disease, Personal history of other endocrine, nutritional and metabolic disease, Personal history of other infectious and parasitic diseases, Personal history of other infectious and parasitic diseases, Personal history of other medical treatment, Personal history of other specified conditions, Personal history of other specified conditions, Personal history of transient ischemic attack (TIA), and cerebral infarction without residual deficits, and Traumatic ischemia of muscle, subsequent encounter.  - has a past surgical history that includes Other surgical history (08/07/2013); Colonoscopy (02/08/2017); Other surgical history (02/08/2017); Other surgical history (02/08/2017); Cystoscopy (02/08/2017); Hip surgery (02/08/2017); Bunionectomy (02/08/2017); CT guided percutaneous biopsy bone (7/18/2013); CT guided percutaneous biopsy bone deep (11/15/2013); and MR angio head wo IV contrast (8/9/2014).  - has Acute left lumbar radiculopathy; Stenosis, spinal, lumbar; Chest pain; Cholelithiasis; Diplopia; Discitis of lumbar region; Elevated LDL cholesterol level; Fall at home; Fatigue; Weakness; Functional diarrhea; Gait difficulty; High level of cardiac marker; Hypertension associated with stage 3 chronic kidney disease due to type 2 diabetes mellitus (Multi); Impairment of balance; Leukocytosis; Obesity, Class II, BMI 35-39.9; Osteopenia; Osteophyte of right hand;  Primary osteoarthritis of both hands; Adhesive capsulitis of left shoulder; Primary osteoarthritis of left shoulder; Recurrent falls while walking; Renal mass, right; Spondylosis of lumbosacral region without myelopathy or radiculopathy; Type 2 diabetes mellitus with mild nonproliferative diabetic retinopathy with macular edema, bilateral (Multi); Urinary tract infection; CKD (chronic kidney disease), stage III (Multi); Sciatica of left side; Low back pain; Adrenal mass, right (Multi); and Morbid (severe) obesity due to excess calories (Multi) on their problem list.    Medications:  Reviewed in EMR. See EMR for complete list of medications and doses.    Allergies:  Patient has no known allergies.    Social History:  - Tobacco:  reports that she has never smoked. She has never used smokeless tobacco.   - Alcohol:  reports no history of alcohol use.   - Illicit Drugs:  reports no history of drug use.     ROS:  Per HPI.       ???????????????????????????????????????????????????????????????  Triage Vitals:  T 36.4 °C (97.5 °F)  HR 79  BP 95/66  RR 18  O2 98 % None (Room air)    Physical Exam  ???????????????????????????????????????????????????????????????  GEN: Ill-appearing, no acute distress  HEAD: Hematoma to left forehead  EYES: PERRL, EOMI  NECK: no C-spine tenderness to palpation  CVS/CHEST: reg rate, nl rhythm  PULM: CTA b/l no wheezes, crackles, or rhonchi   GI: soft, NT/ND, no rebound or guarding   EXT: Left leg is mildly internally rotated, knee is bent, tenderness to palpation over left proximal femur, 2+ periph pulses in bilat radial and DP   NEURO: Awake and alert, answering questions appropriately.  Moving upper extremities equally and spontaneously.  Movement in left lower leg is limited secondary to pain.  SKIN: warm, dry  PSYCH: AAOx3 answers questions appropriately    EKG:  EKG read by me reviewed by me is normal sinus rhythm at 74 bpm.  Left axis deviation.  No significant ST segment elevation or  depression.    Assessment and Plan:  Patient is an 83-year-old female presents emergency department with what she describes as a mechanical fall yesterday.  She has tenderness to palpation in the left proximal femur.  Concern for fracture.  X-rays obtained.  She did hit her head.  CT of her head and C-spine ordered.  She is not on anticoagulation.  Labs obtained given that she was on the floor for 9 hours including a CK.  Given a liter of IV fluids.  Disposition pending workup but anticipate admission as she is unable to ambulate on her left leg.  CT negative for acute fracture.  Given that patient lives alone and laid on the floor for 9 hours with significant metabolic abnormalities patient will be admitted to medicine for further workup and evaluation.  Discussed with patient who is agreeable with the plan.    ED Course:  ED Course as of 08/03/24 0536   Fri Aug 02, 2024   1220 CT of the head and C-spine negative. [HD]   1220 Patient does have an ROCKY and elevated CK likely from laying on the floor with an elevated troponin.  EKG nonischemic.  Serial troponin will be sent.  Given 2 L of IV fluids. [HD]   1220 X-rays reviewed by me I do not see a definitive fracture but she has significant osteoarthritis.  Will await final reads but anticipate patient will likely need a CT for further evaluation. [HD]      ED Course User Index  [HD] Shweta Rahman DO         Diagnoses as of 08/03/24 0536   Urinary tract infection without hematuria, site unspecified   ROCKY (acute kidney injury) (CMS-HCC)   Elevated CK       Social Determinants Limiting Care:  Lives alone    Disposition:  Admitted    Shweta Rahman DO      Procedures ? SmartLinks last updated 8/2/2024 11:16 AM        Shweta Rahman DO  08/03/24 0537

## 2024-08-02 NOTE — PROGRESS NOTES
"Pharmacy Medication History Review    Mariaelena Ruiz is a 83 y.o. female admitted for No Principal Problem: There is no principal problem currently on the Problem List. Please update the Problem List and refresh.. Pharmacy reviewed the patient's [id number] medications and allergies for accuracy.    The list below reflectives the updated PTA list. Please review each medication in order reconciliation for additional clarification and justification.  Prior to Admission Medications   Prescriptions Last Dose Informant Patient Reported? Taking?   BD Insulin Syringe Ultra-Fine 0.5 mL 30 gauge x 1/2\" syringe  Self No    Sig: USE TO INJECT INSULIN      SUBCUTANEOUSLY TWO TIMES A DAY   BD Ultra-Fine Short Pen Needle 31 gauge x 5/16\" needle  Self No    Sig: USE TO INJECT INSULIN      SUBCUTANEOUSLY TWO TIMES A DAY   DULoxetine (Cymbalta) 60 mg DR capsule 7/31/2024 at am Self No Yes   Sig: TAKE 1 CAPSULE BY MOUTH DAILY   UNABLE TO FIND 7/30/2024 Self Yes Yes   Sig: Take 1 capsule by mouth every other day. Med Name: Turmeric Curcumin   acetaminophen (Tylenol) 325 mg tablet Unknown Self No Yes   Sig: Take 2 tablets (650 mg) by mouth every 6 hours if needed for mild pain (1 - 3).   amLODIPine (Norvasc) 5 mg tablet 7/31/2024 at am Self No Yes   Sig: TAKE ONE TABLET BY MOUTH DAILY   aspirin 81 mg chewable tablet 7/31/2024 at am Self No Yes   Sig: Chew 1 tablet (81 mg) once daily. Do not start before December 8, 2023.   atorvastatin (Lipitor) 20 mg tablet 7/31/2024 at am Self No Yes   Sig: TAKE 1 TABLET BY MOUTH EVERY DAY   blood sugar diagnostic (OneTouch Ultra Test) strip  Self No    Sig: Check sugar twice a day   bromfenac (Xibrom) 0.09 % ophthalmic solution 7/31/2024 Self Yes Yes   Sig: Administer 1 drop into both eyes 2 times a day.   coenzyme Q-10 100 mg capsule 7/30/2024 Self Yes Yes   Sig: Take by mouth every other day.   gabapentin (Neurontin) 300 mg capsule 7/31/2024 at am Self Yes Yes   Sig: Take 2 capsules (600 " "mg) by mouth once daily in the morning.   insulin glargine (Lantus U-100 Insulin) 100 unit/mL injection 7/31/2024 at am Self No Yes   Sig: INJECT SUBCUTANEOUSLY 35   UNITS IN THE MORNING AND 20UNITS IN THE EVENING   insulin lispro (HumaLOG KwikPen Insulin) 100 unit/mL injection 7/31/2024 at am Self No Yes   Sig: Inject 15 units before breakfast and 10 units before dinner   insulin syringe-needle U-100 30G X 1/2\" 0.5 mL syringe  Self Yes    Sig: USE TO INJECT INSULIN SC BID   lancets misc  Self No    Sig: CHECK SUGAR 3X DAILY   losartan (Cozaar) 100 mg tablet 7/31/2024 at am Self No Yes   Sig: Take 1 tablet (100 mg) by mouth once daily.   multivit-min/ferrous fumarate (MULTI VITAMIN ORAL) 7/31/2024 at am Self Yes Yes   Sig: Take 1 tablet by mouth once daily in the morning.      Facility-Administered Medications: None           The list below reflectives the updated allergy list. Please review each documented allergy for additional clarification and justification.  Allergies  Reviewed by Chaparrita Terry RN on 8/2/2024   No Known Allergies         Below are additional concerns with the patient's PTA list.      Holley Ramírez    "

## 2024-08-02 NOTE — ED TRIAGE NOTES
Pt presented to the ED with c/o fall and right hip pain. Pt had a mechanical fall yesterday and was on the ground for 9 hours. Pt was found by her son how comes to help her around the house. Pt today was still complaining of right hip pain and painful to put pressure on it. Pt usually ambulates with a walker at home.

## 2024-08-03 LAB
ALBUMIN SERPL BCP-MCNC: 2.9 G/DL (ref 3.4–5)
ANION GAP SERPL CALC-SCNC: 10 MMOL/L (ref 10–20)
BUN SERPL-MCNC: 30 MG/DL (ref 6–23)
CALCIUM SERPL-MCNC: 8.2 MG/DL (ref 8.6–10.3)
CHLORIDE SERPL-SCNC: 110 MMOL/L (ref 98–107)
CK SERPL-CCNC: 1054 U/L (ref 0–215)
CO2 SERPL-SCNC: 27 MMOL/L (ref 21–32)
CREAT SERPL-MCNC: 1.03 MG/DL (ref 0.5–1.05)
EGFRCR SERPLBLD CKD-EPI 2021: 54 ML/MIN/1.73M*2
ERYTHROCYTE [DISTWIDTH] IN BLOOD BY AUTOMATED COUNT: 14.1 % (ref 11.5–14.5)
GLUCOSE BLD MANUAL STRIP-MCNC: 149 MG/DL (ref 74–99)
GLUCOSE BLD MANUAL STRIP-MCNC: 168 MG/DL (ref 74–99)
GLUCOSE BLD MANUAL STRIP-MCNC: 174 MG/DL (ref 74–99)
GLUCOSE BLD MANUAL STRIP-MCNC: 204 MG/DL (ref 74–99)
GLUCOSE SERPL-MCNC: 167 MG/DL (ref 74–99)
HCT VFR BLD AUTO: 35.7 % (ref 36–46)
HGB BLD-MCNC: 11.2 G/DL (ref 12–16)
HOLD SPECIMEN: NORMAL
MAGNESIUM SERPL-MCNC: 1.9 MG/DL (ref 1.6–2.4)
MCH RBC QN AUTO: 27.9 PG (ref 26–34)
MCHC RBC AUTO-ENTMCNC: 31.4 G/DL (ref 32–36)
MCV RBC AUTO: 89 FL (ref 80–100)
NRBC BLD-RTO: 0 /100 WBCS (ref 0–0)
PHOSPHATE SERPL-MCNC: 2.5 MG/DL (ref 2.5–4.9)
PLATELET # BLD AUTO: 193 X10*3/UL (ref 150–450)
POTASSIUM SERPL-SCNC: 3.6 MMOL/L (ref 3.5–5.3)
RBC # BLD AUTO: 4.01 X10*6/UL (ref 4–5.2)
SODIUM SERPL-SCNC: 143 MMOL/L (ref 136–145)
WBC # BLD AUTO: 9.5 X10*3/UL (ref 4.4–11.3)

## 2024-08-03 PROCEDURE — 2500000001 HC RX 250 WO HCPCS SELF ADMINISTERED DRUGS (ALT 637 FOR MEDICARE OP)

## 2024-08-03 PROCEDURE — 2500000002 HC RX 250 W HCPCS SELF ADMINISTERED DRUGS (ALT 637 FOR MEDICARE OP, ALT 636 FOR OP/ED)

## 2024-08-03 PROCEDURE — 83735 ASSAY OF MAGNESIUM: CPT

## 2024-08-03 PROCEDURE — 82947 ASSAY GLUCOSE BLOOD QUANT: CPT

## 2024-08-03 PROCEDURE — 85027 COMPLETE CBC AUTOMATED: CPT

## 2024-08-03 PROCEDURE — 36415 COLL VENOUS BLD VENIPUNCTURE: CPT

## 2024-08-03 PROCEDURE — 2500000005 HC RX 250 GENERAL PHARMACY W/O HCPCS

## 2024-08-03 PROCEDURE — 82550 ASSAY OF CK (CPK): CPT

## 2024-08-03 PROCEDURE — 80069 RENAL FUNCTION PANEL: CPT

## 2024-08-03 PROCEDURE — 99232 SBSQ HOSP IP/OBS MODERATE 35: CPT

## 2024-08-03 PROCEDURE — 1200000002 HC GENERAL ROOM WITH TELEMETRY DAILY

## 2024-08-03 PROCEDURE — 2500000004 HC RX 250 GENERAL PHARMACY W/ HCPCS (ALT 636 FOR OP/ED)

## 2024-08-03 ASSESSMENT — COGNITIVE AND FUNCTIONAL STATUS - GENERAL
STANDING UP FROM CHAIR USING ARMS: A LITTLE
STANDING UP FROM CHAIR USING ARMS: A LITTLE
DAILY ACTIVITIY SCORE: 18
STANDING UP FROM CHAIR USING ARMS: A LITTLE
WALKING IN HOSPITAL ROOM: A LOT
PERSONAL GROOMING: A LITTLE
CLIMB 3 TO 5 STEPS WITH RAILING: A LOT
CLIMB 3 TO 5 STEPS WITH RAILING: A LOT
DRESSING REGULAR LOWER BODY CLOTHING: A LITTLE
DRESSING REGULAR LOWER BODY CLOTHING: A LITTLE
WALKING IN HOSPITAL ROOM: A LOT
TOILETING: A LITTLE
EATING MEALS: A LITTLE
HELP NEEDED FOR BATHING: A LITTLE
MOBILITY SCORE: 16
TURNING FROM BACK TO SIDE WHILE IN FLAT BAD: A LITTLE
MOVING FROM LYING ON BACK TO SITTING ON SIDE OF FLAT BED WITH BEDRAILS: A LITTLE
EATING MEALS: A LITTLE
MOVING FROM LYING ON BACK TO SITTING ON SIDE OF FLAT BED WITH BEDRAILS: A LITTLE
TURNING FROM BACK TO SIDE WHILE IN FLAT BAD: A LITTLE
HELP NEEDED FOR BATHING: A LITTLE
MOVING FROM LYING ON BACK TO SITTING ON SIDE OF FLAT BED WITH BEDRAILS: A LITTLE
DAILY ACTIVITIY SCORE: 18
TURNING FROM BACK TO SIDE WHILE IN FLAT BAD: A LITTLE
DAILY ACTIVITIY SCORE: 18
DRESSING REGULAR UPPER BODY CLOTHING: A LITTLE
CLIMB 3 TO 5 STEPS WITH RAILING: A LOT
MOVING TO AND FROM BED TO CHAIR: A LITTLE
EATING MEALS: A LITTLE
PERSONAL GROOMING: A LITTLE
MOBILITY SCORE: 16
MOVING TO AND FROM BED TO CHAIR: A LITTLE
MOVING TO AND FROM BED TO CHAIR: A LITTLE
PERSONAL GROOMING: A LITTLE
TOILETING: A LITTLE
DRESSING REGULAR LOWER BODY CLOTHING: A LITTLE
HELP NEEDED FOR BATHING: A LITTLE
WALKING IN HOSPITAL ROOM: A LOT
DRESSING REGULAR UPPER BODY CLOTHING: A LITTLE
TOILETING: A LITTLE
DRESSING REGULAR UPPER BODY CLOTHING: A LITTLE
MOBILITY SCORE: 16

## 2024-08-03 ASSESSMENT — PAIN SCALES - GENERAL
PAINLEVEL_OUTOF10: 0 - NO PAIN

## 2024-08-03 ASSESSMENT — ACTIVITIES OF DAILY LIVING (ADL): LACK_OF_TRANSPORTATION: NO

## 2024-08-03 NOTE — CARE PLAN
The patient's goals for the shift include      The clinical goals for the shift include Patient up in chair this shift

## 2024-08-03 NOTE — PROGRESS NOTES
"  Subjective    Overnight Events: SACHIN Ferrell states she is doing well this morning. Is not in pain but has not tried to ambulate since she was admitted. Last BM was yesterday. Denies urinary symptoms, chest pain, SOB, abdominal pain,  Denies CP, SOB, abd pain, N/V/D.    ROS: 12 points review of system is negative except as stated in the HPI above.     Objective    Vitals  Visit Vitals  /66 (BP Location: Right arm, Patient Position: Lying)   Pulse 73   Temp 36.3 °C (97.3 °F) (Temporal)   Resp 16   Ht 1.676 m (5' 5.98\")   Wt 98 kg (216 lb)   SpO2 95%   BMI 34.88 kg/m²   Smoking Status Never   BSA 2.14 m²       Physical Exam   Physical Exam  Constitutional:       General: She is not in acute distress.     Appearance: Normal appearance.   Eyes:      Extraocular Movements: Extraocular movements intact.      Pupils: Pupils are equal, round, and reactive to light.   Cardiovascular:      Rate and Rhythm: Normal rate and regular rhythm.      Pulses: Normal pulses.      Heart sounds: Normal heart sounds. No murmur heard.  Pulmonary:      Effort: Pulmonary effort is normal. No respiratory distress.      Breath sounds: Normal breath sounds.   Abdominal:      General: There is no distension.      Palpations: Abdomen is soft.      Tenderness: There is no abdominal tenderness.   Musculoskeletal:      Comments: Mild pain on deep palpation in area around left hip   Skin:     General: Skin is warm and dry.   Neurological:      General: No focal deficit present.      Mental Status: She is alert and oriented to person, place, and time.   Psychiatric:         Mood and Affect: Mood normal.         Behavior: Behavior normal.           IOs    Intake/Output Summary (Last 24 hours) at 8/3/2024 1030  Last data filed at 8/3/2024 0916  Gross per 24 hour   Intake 3715.67 ml   Output 450 ml   Net 3265.67 ml       Labs:   Results from last 72 hours   Lab Units 08/03/24  0728 08/02/24  1115   SODIUM mmol/L 143 141   POTASSIUM mmol/L 3.6 " 3.9   CHLORIDE mmol/L 110* 106   CO2 mmol/L 27 26   BUN mg/dL 30* 39*   CREATININE mg/dL 1.03 1.62*   GLUCOSE mg/dL 167* 262*   CALCIUM mg/dL 8.2* 8.9   ANION GAP mmol/L 10 13   EGFR mL/min/1.73m*2 54* 31*   PHOSPHORUS mg/dL 2.5  --       Results from last 72 hours   Lab Units 08/03/24  0728 08/02/24  1115   WBC AUTO x10*3/uL 9.5 12.7*   HEMOGLOBIN g/dL 11.2* 13.0   HEMATOCRIT % 35.7* 40.9   PLATELETS AUTO x10*3/uL 193 238   NEUTROS PCT AUTO %  --  81.2   LYMPHS PCT AUTO %  --  8.4   MONOS PCT AUTO %  --  7.3   EOS PCT AUTO %  --  2.0      Lab Results   Component Value Date    CALCIUM 8.2 (L) 08/03/2024    PHOS 2.5 08/03/2024       Images  CT pelvis wo IV contrast  Narrative: STUDY:  CT Pelvis without IV Contrast; 8/2/24 at 2:35 PM  INDICATION:  Left hip pain.  Prior ORIF right hip.  COMPARISON:  Pelvic/left femur XR earlier same date, CT AP 3/1/24.  ACCESSION NUMBER(S):  [id number]  ORDERING CLINICIAN:  SHAMEKA PATEL  TECHNIQUE:  Thin section axial images were obtained through the pelvis  without intravenous contrast.  Orthogonal reconstructed images were  obtained and reviewed.    Automated mA/kV exposure control was utilized and patient examination  was performed in strict accordance with principles of ALARA.  FINDINGS:  Multi planar imaging through the pelvis was performed.  Patient is  status post ORIF of proximal right femur with intramedullary jaylyn and a  proximal nail.  This is unchanged in alignment.  There is moderate  degenerative changes of the hips.  No acute fractures subluxation or  osseous lesions.  No evidence of AVN.  Old anterior wedge compression  fracture L4 and L5 are stable.  Limited views of the bladder unremarkable.  Visualized GI tract images  normally.  Overlying soft tissues are unremarkable.  Impression: 1.  No acute fractures subluxation or osseous lesions.  2.  Degenerative changes affect the hips  3.  Degenerative changes affect the visualized lumbar spine.  Old  compression  fracture at L4 and L5 are noted.  Signed by Sampson Esparza MD  XR pelvis 1-2 views  Narrative: STUDY:  Pelvis Radiographs; 8/2/2024 at 12:17 PM  INDICATION:  Fall.  COMPARISON:  None Available.  ACCESSION NUMBER(S):  [id number]  ORDERING CLINICIAN:  SHAMEKA PATEL  TECHNIQUE:  One view(s) of the pelvis.  FINDINGS:    The pelvic ring is intact.  There is no acute fracture.  There are  degenerative changes in the included lower lumbar spine with possible  lower lumbar laminectomy.  There are degenerative changes of the  sacroiliac joints.  Post-ORIF changes of the proximal right femur are  included in part.  Degenerative changes are noted in the left hip.   Multiple pelvic calcifications most likely represent phleboliths.   Tubing projects over the right pelvis.  Impression: No acute bony abnormalities on x-ray examination of the AP pelvis.  Degenerative and post-ORIF changes.  Signed by Dorys Jorge DO  XR femur left 2+ views  Narrative: STUDY:  Femur Radiographs; 8/2/2024 at 12:17 PM  INDICATION:  Fall.  COMPARISON:  None Available.  ACCESSION NUMBER(S):  [id number]  ORDERING CLINICIAN:  SHAMEKA PATEL  TECHNIQUE:  Two view(s) (four images) of the left femur.  FINDINGS:    There is no displaced fracture.  The alignment is anatomic.  There is  mild narrowing of the hip joint with mild adjacent osteophyte  formation.  More marked joint space narrowing is noted at the knee  with patchy bony demineralization and scattered foci of sclerosis  which are nonspecific.  Soft tissue calcifications noted adjacent to  the greater trochanter.  Pelvic calcifications appear compatible with  phleboliths.  Impression: 1.No acute traumatic bony abnormalities of the left femur.  2.Patchy bony demineralization and mild degenerative joint  disease of the left hip and moderate degenerative joint disease of the  knee.  Signed by Dorys Jorge DO  XR chest 1 view  Narrative: STUDY:  Chest Radiograph;  8/2/2024 at 12:17  PM  INDICATION:  Fall.  COMPARISON:  None Available  ACCESSION NUMBER(S):  [id number]  ORDERING CLINICIAN:  SHAMEKA PATEL  TECHNIQUE:  Frontal chest was obtained at 1216 hours.  FINDINGS:  CARDIOMEDIASTINAL SILHOUETTE:  Cardiomediastinal silhouette is normal in size and configuration.     LUNGS:  Lungs are clear.  There is no dense consolidation, pleural effusion,  or pneumothorax.     ABDOMEN:  No remarkable upper abdominal findings.     BONES:  No acute osseous changes.  Degenerative changes are noted in the  shoulders and spine.  Impression: Normal heart size with no radiographic signs of active pulmonary  parenchymal infiltration.  No acute traumatic abnormalities.  Signed by Dorys Jorge DO  ECG 12 lead  Normal sinus rhythm  Left axis deviation  Anterior infarct , age undetermined  Abnormal ECG  When compared with ECG of 21-MAR-2023 15:39,  Previous ECG has undetermined rhythm, needs review  Right bundle branch block is no longer Present  Anterior infarct is now Present  See ED provider note for full interpretation and clinical correlation  Confirmed by Melania Oconnell ([id number]) on 8/2/2024 12:36:51 PM  CT cervical spine wo IV contrast  Narrative: Interpreted By:  Ayush Macias,   STUDY:  CT CERVICAL SPINE WO IV CONTRAST; 8/2/2024 11:43 am      INDICATION:  Signs/Symptoms:fall.      COMPARISON:  None.      ACCESSION NUMBER(S):  [id number]      ORDERING CLINICIAN:  SHAMEKA PATEL      TECHNIQUE:  Contiguous axial CT images were obtained at  2 mm slice thickness  through the cervical spine without contrast administration. The  images were then reconstructed in the coronal and sagittal planes.      FINDINGS:  There is no CT evidence of acute fracture identified. No prevertebral  soft tissue swelling is identified.      ALIGNMENT:  There is minimal anterolisthesis of C2 on C3 and of C3 on C4 and  minimal retrolisthesis of C4 on C5.      VERTEBRAE/DISC SPACES:  Moderate to severe disc space narrowing and  bulky, bridging,  osteophyte formation is seen at the C4-5, C5-6 and C6-7 levels.  Severe disc space narrowing and moderate bridging osteophytes are  present at the C3-4 level. Moderate to severe facet degenerative  changes are seen in the proximal cervical spine.      At least moderate to severe central canal narrowing is seen at the  C4-5 and C6-7 levels. Neural foraminal narrowing is seen throughout  the cervical spine.      ADDITIONAL FINDINGS:  Evaluation of the visualized soft tissues of the neck is limited by  the lack of intravenous contrast.  Within this limitation, no gross  mass or lymphadenopathy is identified.      Impression: 1.  No CT evidence of acute fracture.  2.  Degenerative changes throughout the cervical spine, as above.      Signed by: Ayush Macias 8/2/2024 12:14 PM  Dictation workstation:   [id number]  CT head wo IV contrast  Narrative: Interpreted By:  Ayush Macias,   STUDY:  CT HEAD WO IV CONTRAST; 8/2/2024 11:43 am      INDICATION:  Signs/Symptoms:fall.      COMPARISON:  CT head dated 06/29/2022      ACCESSION NUMBER(S):  [id number]      ORDERING CLINICIAN:  SHAMEKA PATEL      TECHNIQUE:  Contiguous axial CT images were obtained through the head at 5 mm  slice thickness without contrast administration.      FINDINGS:  INTRACRANIAL:  The ventricles, sulci and basal cisterns are within normal limits for  size and configuration. The grey-white differentiation is intact.  There is no mass effect or midline shift. There is no extraaxial  fluid collection. There is no intracranial hemorrhage.  The calvarium  is unremarkable.      EXTRACRANIAL:  Visualized paranasal sinuses and mastoids are clear.      Impression: No evidence of acute cortical infarct or intracranial hemorrhage.      MACRO:  None          Signed by: Ayush Macias 8/2/2024 12:09 PM  Dictation workstation:   [id number]      Meds  Scheduled medications  acetaminophen, 975 mg, oral, q6h  amLODIPine, 5 mg, oral, Daily  aspirin,  81 mg, oral, Daily  atorvastatin, 20 mg, oral, Nightly  cyclobenzaprine, 5 mg, oral, Nightly  DULoxetine, 60 mg, oral, Daily  gabapentin, 600 mg, oral, q AM  heparin (porcine), 5,000 Units, subcutaneous, q8h  [Held by provider] insulin glargine, 20 Units, subcutaneous, BID  insulin lispro, 0-10 Units, subcutaneous, TID  insulin lispro, 5 Units, subcutaneous, BID  lidocaine, 1 patch, transdermal, Daily  polyethylene glycol, 17 g, oral, Daily      Continuous medications       PRN medications  PRN medications: dextrose, dextrose, dextrose, dextrose, glucagon, glucagon, glucagon, glucagon, oxyCODONE, oxyCODONE     Assessment and Plan    Assessment/Plan   Principal Problem:    Urinary tract infection without hematuria, site unspecified    Mariaelena Ruiz is a 83 y.o. female PMHx insulin-dependent T2DM (A1c 7.7 [5/2024]), HTN, HLD, CKD and adrenal/renal mass who presented to the hospital after a mechanical fall on her L hip in her home without LOC or prodromal symptoms. Was found down after 9 hours - had severe left sided hip pain when attempting to bear weight. No fractures on imaging. ROCKY resolved after IVF.     Acute Medical Issues   #ROCKY likely dehydration with possible component of rhabdo   #leukocytosis likely reactive from above   -Cr 1.03 (1.62 on admission)  -WBC 9.5 (12.7 on admission)  -Stop IVF, CTM CK. Electrolytes wnl   -hold home losartan     #mechanical fall   -no fx on CT  -pain control, PT/OT      #asymptomatic pyuria  PT/OT  rocephin     Chronic Medical Issues   #DM  -Adult diet with carb control   -Monitor labs  -Lantus 20 with breakfast and dinner  -Mild SSI     #hx of renal/adrenal mass   -defer to outpatient for now     #CKD3a  -Monitor daily renal labs      #HTN  -cont amlodipine  -hold losartan     #HLD  -continue atorvastatin     F: PO intake & IVF PRN   E: Replete as needed   N: Regular diet  GI ppx: N/A  DVT ppx: Heparin  Antibiotics: ceftriaxone in ED  Tubes/Lines/Drains: none    Dispo:  Medically cleared, pending PT/OT eval for placement     LOS: 1 day     Toño Larsen MD  PGY-1 Transitional

## 2024-08-03 NOTE — HOSPITAL COURSE
Mariaelena Ruiz is a 83 y.o. female PMHx insulin-dependent T2DM (A1c 7.7 [5/2024]), HTN, HLD, CKD and adrenal/renal mass who presented to the hospital after a mechanical fall on her L hip in her home without LOC or prodromal symptoms. Was found down after 9 hours - had severe left sided hip pain when attempting to bear weight.     In ED, labs were notable for BUN 39, Cr 1.62 (baseline 1-1.3), AST 62, CK 1953, lactate 2.4, troponin 97-> 77, UA with positive leukocyte esterase but no subjective symptoms of UTI. Patient was given ceftriaxone and 2L IVF in ED. No fractures or acute changes on imaging.     While on floors, patient's ROCKY (likely due to dehydration while down) resolved. Patient improved subjectively and felt comfortable ambulating without significant pain. She was discharged to SNF ([facility]) with plans to follow-up with primary care provider.    Patient advised prior to discharge to follow-up with PCP/Consultants as instructed for routine post discharge follow-up and care. At time of discharge, vital signs stable, physical exam and labs were reviewed. Discharge planning and return precautions were discussed with patient at bedside, all questions were answered to her satisfaction and she verbalized understanding. Patient discharged in stable condition to home.

## 2024-08-03 NOTE — PROGRESS NOTES
08/03/24 0928   Discharge Planning   Living Arrangements Alone   Support Systems Children   Assistance Needed A&Ox3, independent at baseline with a rollator/ walker; has a shower chair, elevated toilet seat and grab bars, drives   Type of Residence Private residence   Number of Stairs to Enter Residence 9   Number of Stairs Within Residence 2   Do you have animals or pets at home? No   Who is requesting discharge planning? Provider   Home or Post Acute Services Post acute facilities (Rehab/SNF/etc)   Type of Post Acute Facility Services Skilled nursing   Expected Discharge Disposition SNF   Does the patient need discharge transport arranged? Yes   RoundTrip coordination needed? Yes   Financial Resource Strain   How hard is it for you to pay for the very basics like food, housing, medical care, and heating? Pt Declined   Housing Stability   In the last 12 months, was there a time when you were not able to pay the mortgage or rent on time? N   In the past 12 months, how many times have you moved where you were living? 0   At any time in the past 12 months, were you homeless or living in a shelter (including now)? N   Transportation Needs   In the past 12 months, has lack of transportation kept you from medical appointments or from getting medications? no   In the past 12 months, has lack of transportation kept you from meetings, work, or from getting things needed for daily living? No     PT/OT evals pending, if SNF patient would need a precert and 7000.

## 2024-08-04 VITALS
SYSTOLIC BLOOD PRESSURE: 106 MMHG | HEART RATE: 76 BPM | DIASTOLIC BLOOD PRESSURE: 64 MMHG | HEIGHT: 66 IN | RESPIRATION RATE: 16 BRPM | OXYGEN SATURATION: 94 % | WEIGHT: 216 LBS | BODY MASS INDEX: 34.72 KG/M2 | TEMPERATURE: 98.6 F

## 2024-08-04 LAB
ALBUMIN SERPL BCP-MCNC: 3.1 G/DL (ref 3.4–5)
ANION GAP SERPL CALC-SCNC: 14 MMOL/L (ref 10–20)
BACTERIA UR CULT: ABNORMAL
BUN SERPL-MCNC: 31 MG/DL (ref 6–23)
CALCIUM SERPL-MCNC: 8.5 MG/DL (ref 8.6–10.3)
CHLORIDE SERPL-SCNC: 109 MMOL/L (ref 98–107)
CK SERPL-CCNC: 531 U/L (ref 0–215)
CO2 SERPL-SCNC: 25 MMOL/L (ref 21–32)
CREAT SERPL-MCNC: 1.1 MG/DL (ref 0.5–1.05)
EGFRCR SERPLBLD CKD-EPI 2021: 50 ML/MIN/1.73M*2
ERYTHROCYTE [DISTWIDTH] IN BLOOD BY AUTOMATED COUNT: 14.1 % (ref 11.5–14.5)
GLUCOSE BLD MANUAL STRIP-MCNC: 143 MG/DL (ref 74–99)
GLUCOSE BLD MANUAL STRIP-MCNC: 177 MG/DL (ref 74–99)
GLUCOSE BLD MANUAL STRIP-MCNC: 191 MG/DL (ref 74–99)
GLUCOSE BLD MANUAL STRIP-MCNC: 223 MG/DL (ref 74–99)
GLUCOSE SERPL-MCNC: 152 MG/DL (ref 74–99)
HCT VFR BLD AUTO: 39 % (ref 36–46)
HGB BLD-MCNC: 11.9 G/DL (ref 12–16)
MAGNESIUM SERPL-MCNC: 1.87 MG/DL (ref 1.6–2.4)
MCH RBC QN AUTO: 27.6 PG (ref 26–34)
MCHC RBC AUTO-ENTMCNC: 30.5 G/DL (ref 32–36)
MCV RBC AUTO: 91 FL (ref 80–100)
NRBC BLD-RTO: 0 /100 WBCS (ref 0–0)
PHOSPHATE SERPL-MCNC: 3 MG/DL (ref 2.5–4.9)
PLATELET # BLD AUTO: 192 X10*3/UL (ref 150–450)
POTASSIUM SERPL-SCNC: 3.8 MMOL/L (ref 3.5–5.3)
RBC # BLD AUTO: 4.31 X10*6/UL (ref 4–5.2)
SODIUM SERPL-SCNC: 144 MMOL/L (ref 136–145)
WBC # BLD AUTO: 7.9 X10*3/UL (ref 4.4–11.3)

## 2024-08-04 PROCEDURE — 97165 OT EVAL LOW COMPLEX 30 MIN: CPT | Mod: GO

## 2024-08-04 PROCEDURE — 2500000001 HC RX 250 WO HCPCS SELF ADMINISTERED DRUGS (ALT 637 FOR MEDICARE OP)

## 2024-08-04 PROCEDURE — 1200000002 HC GENERAL ROOM WITH TELEMETRY DAILY

## 2024-08-04 PROCEDURE — 2500000002 HC RX 250 W HCPCS SELF ADMINISTERED DRUGS (ALT 637 FOR MEDICARE OP, ALT 636 FOR OP/ED)

## 2024-08-04 PROCEDURE — 82550 ASSAY OF CK (CPK): CPT

## 2024-08-04 PROCEDURE — 99232 SBSQ HOSP IP/OBS MODERATE 35: CPT

## 2024-08-04 PROCEDURE — 85027 COMPLETE CBC AUTOMATED: CPT

## 2024-08-04 PROCEDURE — 97162 PT EVAL MOD COMPLEX 30 MIN: CPT | Mod: GP

## 2024-08-04 PROCEDURE — 80069 RENAL FUNCTION PANEL: CPT

## 2024-08-04 PROCEDURE — 2500000005 HC RX 250 GENERAL PHARMACY W/O HCPCS

## 2024-08-04 PROCEDURE — 2500000004 HC RX 250 GENERAL PHARMACY W/ HCPCS (ALT 636 FOR OP/ED)

## 2024-08-04 PROCEDURE — 83735 ASSAY OF MAGNESIUM: CPT

## 2024-08-04 PROCEDURE — 36415 COLL VENOUS BLD VENIPUNCTURE: CPT

## 2024-08-04 PROCEDURE — 82947 ASSAY GLUCOSE BLOOD QUANT: CPT

## 2024-08-04 ASSESSMENT — PAIN SCALES - GENERAL
PAINLEVEL_OUTOF10: 0 - NO PAIN

## 2024-08-04 ASSESSMENT — COGNITIVE AND FUNCTIONAL STATUS - GENERAL
MOBILITY SCORE: 17
DRESSING REGULAR UPPER BODY CLOTHING: A LITTLE
MOVING FROM LYING ON BACK TO SITTING ON SIDE OF FLAT BED WITH BEDRAILS: A LITTLE
STANDING UP FROM CHAIR USING ARMS: A LOT
MOBILITY SCORE: 14
DRESSING REGULAR LOWER BODY CLOTHING: A LOT
CLIMB 3 TO 5 STEPS WITH RAILING: A LOT
DRESSING REGULAR LOWER BODY CLOTHING: A LOT
CLIMB 3 TO 5 STEPS WITH RAILING: A LOT
HELP NEEDED FOR BATHING: A LOT
TURNING FROM BACK TO SIDE WHILE IN FLAT BAD: A LITTLE
MOVING TO AND FROM BED TO CHAIR: A LOT
MOVING TO AND FROM BED TO CHAIR: A LOT
MOVING FROM LYING ON BACK TO SITTING ON SIDE OF FLAT BED WITH BEDRAILS: A LITTLE
TURNING FROM BACK TO SIDE WHILE IN FLAT BAD: A LITTLE
DAILY ACTIVITIY SCORE: 17
MOVING TO AND FROM BED TO CHAIR: A LITTLE
WALKING IN HOSPITAL ROOM: A LITTLE
STANDING UP FROM CHAIR USING ARMS: A LOT
DRESSING REGULAR UPPER BODY CLOTHING: A LITTLE
DAILY ACTIVITIY SCORE: 17
STANDING UP FROM CHAIR USING ARMS: A LOT
WALKING IN HOSPITAL ROOM: A LOT
MOBILITY SCORE: 14
DRESSING REGULAR UPPER BODY CLOTHING: A LITTLE
HELP NEEDED FOR BATHING: A LITTLE
TOILETING: A LOT
HELP NEEDED FOR BATHING: A LOT
TURNING FROM BACK TO SIDE WHILE IN FLAT BAD: A LITTLE
DRESSING REGULAR LOWER BODY CLOTHING: A LOT
TOILETING: A LOT
WALKING IN HOSPITAL ROOM: A LOT
DAILY ACTIVITIY SCORE: 17
PERSONAL GROOMING: A LITTLE
TOILETING: A LOT
CLIMB 3 TO 5 STEPS WITH RAILING: A LOT

## 2024-08-04 ASSESSMENT — PAIN - FUNCTIONAL ASSESSMENT
PAIN_FUNCTIONAL_ASSESSMENT: 0-10

## 2024-08-04 ASSESSMENT — ACTIVITIES OF DAILY LIVING (ADL): ADL_ASSISTANCE: INDEPENDENT

## 2024-08-04 NOTE — PROGRESS NOTES
Occupational Therapy    Evaluation    Patient Name: Mariaelena Ruiz  MRN: [id number]  Today's Date: 8/4/2024  Time Calculation  Start Time: 1239  Stop Time: 1255  Time Calculation (min): 16 min    Assessment  IP OT Assessment  OT Assessment: Pt presents with decreased balance, strength, and endurance. Pt to benefit from skilled OT services to increase independence with ADL's, transfers, and mobility  Prognosis: Good  Barriers to Discharge: Decreased caregiver support  Evaluation/Treatment Tolerance: Patient tolerated treatment well  Medical Staff Made Aware: Yes  End of Session Communication: Bedside nurse  End of Session Patient Position: Up in chair, Alarm on  Plan:  Treatment Interventions: ADL retraining, Functional transfer training, UE strengthening/ROM, Endurance training  OT Frequency: 3 times per week  OT Discharge Recommendations: Moderate intensity level of continued care  Equipment Recommended upon Discharge: Wheeled walker  OT Recommended Transfer Status: Minimal assist, Moderate assist, Assist of 1  OT - OK to Discharge: Yes (per OT POC)    Subjective   Current Problem:  1. Urinary tract infection without hematuria, site unspecified        2. ROCKY (acute kidney injury) (CMS-HCC)        3. Elevated CK          General:  General  Reason for Referral: Pt is a 84 y/o woman who was admitted for fall, L hip pain, and UTI (Imaging showed no fx)  Past Medical History Relevant to Rehab: PMHx: insulin-dependent T2DM (A1c 7.7 (5/2024)), HTN, HLD, CKD, and adrenal/renal mass  Family/Caregiver Present: No  Prior to Session Communication: Bedside nurse  Patient Position Received: Up in chair, Alarm on  General Comment: Pt was pleasant and agreeable to OT eval  Precautions:  Medical Precautions: Fall precautions (pure-wick, telemetry)     Pain:  Pain Assessment  Pain Assessment: 0-10  0-10 (Numeric) Pain Score: 0 - No pain    Objective   Cognition:  Overall Cognitive Status: Within Functional Limits  Orientation  Level: Oriented X4 (as observed through conversation)     Home Living:  Type of Home: House  Lives With: Alone  Home Adaptive Equipment: Walker rolling or standard (rollator)  Home Layout: Two level (pt stays on the 1st floor)  Home Access: Stairs to enter with rails  Entrance Stairs-Rails: Right  Entrance Stairs-Number of Steps: 7  Bathroom Shower/Tub: Tub/shower unit  Bathroom Toilet: Handicapped height  Bathroom Equipment: Shower chair with back   Prior Function:  Level of [place]: Independent with ADLs and functional transfers, Independent with homemaking with ambulation  Receives Help From: Family  ADL Assistance: Independent  Homemaking Assistance: Independent  Ambulatory Assistance: Independent (uses rollator)     ADL:  LE Dressing Assistance: Moderate  LE Dressing Deficit: Steadying, Requires assistive device for steadying, Supervision/safety, Increased time to complete, Pull up over hips  Activity Tolerance:  Endurance: Tolerates less than 10 min exercise, no significant change in vital signs  Bed Mobility/Transfers:   Bed Mobility  Bed Mobility: No    Transfers  Transfer: Yes  Transfer 1  Transfer From 1: Sit to, Stand to  Transfer to 1: Sit, Stand  Technique 1: Sit to stand, Stand to sit  Transfer Device 1: Walker  Transfer Level of Assistance 1: Moderate assistance, Minimal verbal cues    Functional Mobility:  Functional Mobility  Functional Mobility Performed: Yes  Functional Mobility 1  Surface 1: Level tile  Device 1: Rolling walker  Assistance 1: Minimum assistance, Minimal verbal cues  Comments 1: Pt took 4-5 steps forward and backwards from the chair with the FWW at min A. Verbal cues to keep walker close were needed  Sitting Balance:  Static Sitting Balance  Static Sitting-Balance Support: Feet supported, No upper extremity supported  Static Sitting-Level of Assistance: Independent  Dynamic Sitting Balance  Dynamic Sitting-Balance Support: Feet supported, No upper extremity supported  Dynamic  Sitting-Balance: Forward lean  Dynamic Sitting-Comments: supervision  Standing Balance:  Static Standing Balance  Static Standing-Balance Support: Bilateral upper extremity supported  Static Standing-Level of Assistance: Contact guard  Dynamic Standing Balance  Dynamic Standing-Balance Support: Bilateral upper extremity supported  Dynamic Standing-Comments: Min A with FWW    Sensation:  Light Touch: No apparent deficits  Strength:  Strength Comments: Bilateral shoulders: 3-/5; Elbows to hands: 3+/5     Extremities: RUE   RUE : Exceptions to WFL (decreased ROM in shoulder; distal: WFL) and LUE   LUE: Exceptions to WFL (decreased ROM in shoulder; distal: WFL)    Outcome Measures: [facility] Daily Activity  Putting on and taking off regular lower body clothing: A lot  Bathing (including washing, rinsing, drying): A lot  Putting on and taking off regular upper body clothing: A little  Toileting, which includes using toilet, bedpan or urinal: A lot  Taking care of personal grooming such as brushing teeth: None  Eating Meals: None  Daily Activity - Total Score: 17      Education Documentation  Body Mechanics, taught by Luna Santa, OT at 8/4/2024  1:26 PM.  Learner: Patient  Readiness: Acceptance  Method: Explanation  Response: Verbalizes Understanding, Needs Reinforcement    Education Comments  No comments found.      Goals:   Encounter Problems       Encounter Problems (Active)       OT Goals       Pt to demonstrate transfers with FWW at mod I        Start:  08/04/24    Expected End:  08/18/24            Pt will demo increased functional mobility to tolerate tasks necessary to complete ADL routine with FWW at mod I        Start:  08/04/24    Expected End:  08/18/24            Pt will demo GOOD-/+ static/dynamic standing balance during ADL and functional activity with FWW >5 minutes for improved independence and participation in ADL        Start:  08/04/24    Expected End:  08/18/24            Pt to demonstrate LB  dressing and toileting at mod I        Start:  08/04/24    Expected End:  08/18/24            Pt will increase endurance to tolerate 15 min of activity with no more than 1 rest break in order to increase ability to engage in ADL completion.        Start:  08/04/24    Expected End:  08/18/24

## 2024-08-04 NOTE — PROGRESS NOTES
Physical Therapy    Physical Therapy Evaluation    Patient Name: Mariaelena Ruiz  MRN: [id number]  Today's Date: 8/4/2024   Time Calculation  Start Time: 1014  Stop Time: 1036  Time Calculation (min): 22 min    Assessment/Plan   PT Assessment  PT Assessment Results: Decreased strength, Decreased mobility, Obesity (deconditioning)  Rehab Prognosis: Good  Evaluation/Treatment Tolerance: Patient limited by fatigue  Medical Staff Made Aware: Yes  Strengths: Ability to acquire knowledge  Barriers to Participation: Comorbidities  End of Session Communication: Bedside nurse  End of Session Patient Position: Up in chair, Alarm on  IP OR SWING BED PT PLAN  Inpatient or Swing Bed: Inpatient  PT Plan  Treatment/Interventions: Bed mobility, Transfer training, Gait training, Strengthening, Therapeutic exercise  PT Plan: Ongoing PT  PT Frequency: 3 times per week  PT Discharge Recommendations: Moderate intensity level of continued care  Equipment Recommended upon Discharge: Wheeled walker  PT Recommended Transfer Status: Assist x1  PT - OK to Discharge: Yes (Per PT POC)      Subjective   General Visit Information:  General  Reason for Referral:  (82 yo female admitted 2' to Fall, L hip pain. X Ray show no fx)  Referred By:  (Dr. YONI Larsen)  Past Medical History Relevant to Rehab:  (T2 DM, HTN, HLD, CKD, adrenal mass)  Prior to Session Communication: Bedside nurse  Patient Position Received: Bed, 3 rail up, Alarm on  General Comment:  (Pt is pleasant and agreeable to work with PT. Bhaskar, telemetry)  Home Living:  Home Living  Type of Home: House  Lives With: Alone (Son checks on her every other day)  Home Adaptive Equipment: Walker rolling or standard (rollator)  Home Layout: Two level (Pt statesthat she only uses the 1st floor)  Home Access: Stairs to enter with rails  Entrance Stairs-Rails:  (1 hand rail)  Entrance Stairs-Number of Steps:  (8)  Bathroom Shower/Tub: Tub/shower unit  Bathroom Equipment: Shower chair with back,  Raised toilet seat with rails  Prior Level of Function:  Prior Function Per Pt/Caregiver Report  Level of [place]: Independent with ADLs and functional transfers, Independent with homemaking with ambulation (rollator walker)  Receives Help From: Family  Ambulatory Assistance: Independent  Precautions:  Precautions  Medical Precautions: Fall precautions  Vital Signs:       Objective   Pain:  Pain Assessment  Pain Assessment: 0-10  0-10 (Numeric) Pain Score: 0 - No pain  Cognition:  Cognition  Overall Cognitive Status: Within Functional Limits    General Assessments:  General Observation  General Observation:  (Pt is moving caitiously but, gave good effort. Recommend MODERATE follow up services based on her current functional status)               Activity Tolerance  Endurance: Tolerates 10 - 20 min exercise with multiple rests         Strength  Strength Comments:  (B UE and LE are grossly 4+ of 5)  Static Sitting Balance  Static Sitting-Balance Support: Bilateral upper extremity supported, Feet supported  Static Sitting-Level of Assistance: Distant supervision    Static Standing Balance  Static Standing-Balance Support: Bilateral upper extremity supported (wheeled walker)  Static Standing-Level of Assistance: Minimum assistance, Moderate assistance  Dynamic Standing Balance  Dynamic Standing-Balance Support: Bilateral upper extremity supported (wheeled walker)  Dynamic Standing-Comments:  (Minimum,Moderate assist)  Functional Assessments:  Bed Mobility  Bed Mobility: Yes  Bed Mobility 1  Bed Mobility 1: Supine to sitting  Level of Assistance 1: Close supervision    Transfers  Transfer: Yes  Transfer 1  Transfer From 1: Bed to  Transfer to 1: Stand  Technique 1: Sit to stand, Stand to sit  Transfer Device 1:  (wheeled walker)  Transfer Level of Assistance 1: Minimum assistance    Ambulation/Gait Training  Ambulation/Gait Training Performed: Yes  Ambulation/Gait Training 1  Surface 1: Level tile  Device 1: Rolling  walker  Assistance 1: Minimum assistance, Moderate assistance  Quality of Gait 1: Decreased step length (Decreased eli)  Comments/Distance (ft) 1:  (5 feet from bed to chair)    Stairs  Stairs: No  Extremity/Trunk Assessments:  RUE   RUE :  (shoulder limited, otherwise R UE is WFL)  LUE   LUE:  (Shoulder limited, otherwise L UE is WFL)  RLE   RLE : Within Functional Limits  LLE   LLE : Within Functional Limits  Outcome Measures:  [facility] Basic Mobility  Turning from your back to your side while in a flat bed without using bedrails: A little  Moving from lying on your back to sitting on the side of a flat bed without using bedrails: A little  Moving to and from bed to chair (including a wheelchair): A lot  Standing up from a chair using your arms (e.g. wheelchair or bedside chair): A lot  To walk in hospital room: A lot  Climbing 3-5 steps with railing: A lot  Basic Mobility - Total Score: 14    Encounter Problems       Encounter Problems (Active)       Mobility       STG - Patient will ambulate 80 feet MOD I with wheeled walker (Progressing)       Start:  08/04/24    Expected End:  08/18/24            STG - Patient will ascend and descend four to six stairs MOD I with 1 rail (Progressing)       Start:  08/04/24    Expected End:  08/18/24               PT Transfers       STG - Patient to transfer to and from sit to supine independently (Progressing)       Start:  08/04/24    Expected End:  08/18/24            STG - Patient will transfer sit to and from stand MOD I with wheeled walker (Progressing)       Start:  08/04/24    Expected End:  08/18/24                       Education Documentation  No documentation found.  Education Comments  No comments found.

## 2024-08-04 NOTE — PROGRESS NOTES
08/04/24 1545   Discharge Planning   Living Arrangements Alone   Support Systems Children   Assistance Needed A&Ox3, independent at baseline with a rollator/ walker; has a shower chair, elevated toilet seat and grab bars, drives   Type of Residence Private residence   Number of Stairs to Enter Residence 9   Number of Stairs Within Residence 2   Do you have animals or pets at home? No   Who is requesting discharge planning? Provider   Home or Post Acute Services Post acute facilities (Rehab/SNF/etc)   Type of Post Acute Facility Services Skilled nursing;Rehab   Expected Discharge Disposition SNF  (spoke to patient regarding- MOD recs for SNF. Patient is adamant that referrals be sent to [facility] (explained she may not be accepted medically and then would require insurance auth) Alternatively patient agrees to referral being sent to [facility])   Does the patient need discharge transport arranged? Yes   RoundTrip coordination needed? Yes   Has discharge transport been arranged? No

## 2024-08-04 NOTE — PROGRESS NOTES
"Mariaelena Ruiz is a 83 y.o. female on day 2 of admission presenting with Urinary tract infection without hematuria, site unspecified.      Subjective   NAEO. Patient doing well this morning. No new complaints    Objective   Visit Vitals  /75 (BP Location: Right arm, Patient Position: Lying)   Pulse 76   Temp 36.5 °C (97.7 °F) (Temporal)   Resp 16   Ht 1.676 m (5' 5.98\")   Wt 98 kg (216 lb)   SpO2 94%   BMI 34.88 kg/m²   Smoking Status Never   BSA 2.14 m²     Results from last 7 days   Lab Units 08/04/24  0626   WBC AUTO x10*3/uL 7.9   HEMOGLOBIN g/dL 11.9*   HEMATOCRIT % 39.0   PLATELETS AUTO x10*3/uL 192     Results from last 7 days   Lab Units 08/04/24  0626   SODIUM mmol/L 144   POTASSIUM mmol/L 3.8   CHLORIDE mmol/L 109*   CO2 mmol/L 25   BUN mg/dL 31*   CREATININE mg/dL 1.10*   EGFR mL/min/1.73m*2 50*   GLUCOSE mg/dL 152*   CALCIUM mg/dL 8.5*   PHOSPHORUS mg/dL 3.0     Results from last 7 days   Lab Units 08/02/24  1115   ALK PHOS U/L 91   BILIRUBIN TOTAL mg/dL 0.9   PROTEIN TOTAL g/dL 6.2*   ALT U/L 25   AST U/L 62*     Results from last 7 days   Lab Units 08/02/24  1115   INR  1.1       Physical Exam  Constitutional:       General: She is not in acute distress.     Appearance: Normal appearance.   Cardiovascular:      Rate and Rhythm: Normal rate and regular rhythm.      Pulses: Normal pulses.      Heart sounds: Normal heart sounds. No murmur heard.  Pulmonary:      Effort: Pulmonary effort is normal. No respiratory distress.      Breath sounds: Normal breath sounds.   Abdominal:      General: There is no distension.      Palpations: Abdomen is soft.      Tenderness: There is no abdominal tenderness.   Musculoskeletal:      Comments: Mild pain on deep palpation in area around left hip   Skin:     General: Skin is warm and dry.   Neurological:      General: No focal deficit present.      Mental Status: She is alert and oriented to person, place, and time.   Psychiatric:         Mood and Affect: Mood " normal.         Behavior: Behavior normal.     Relevant Results  Scheduled medications  acetaminophen, 975 mg, oral, q6h  amLODIPine, 5 mg, oral, Daily  aspirin, 81 mg, oral, Daily  atorvastatin, 20 mg, oral, Nightly  cyclobenzaprine, 5 mg, oral, Nightly  DULoxetine, 60 mg, oral, Daily  gabapentin, 600 mg, oral, q AM  heparin (porcine), 5,000 Units, subcutaneous, q8h  insulin glargine, 20 Units, subcutaneous, BID  insulin lispro, 0-10 Units, subcutaneous, TID  insulin lispro, 5 Units, subcutaneous, BID  lidocaine, 1 patch, transdermal, Daily  polyethylene glycol, 17 g, oral, Daily      Continuous medications     PRN medications  PRN medications: dextrose, dextrose, dextrose, dextrose, glucagon, glucagon, glucagon, glucagon, oxyCODONE, oxyCODONE      Assessment/Plan      Principal Problem:    Urinary tract infection without hematuria, site unspecified  Mariaelena Ruiz is a 83 y.o. female PMHx insulin-dependent T2DM (A1c 7.7 [5/2024]), HTN, HLD, CKD and adrenal/renal mass who presented to the hospital after a mechanical fall on her L hip in her home without LOC or prodromal symptoms. Was found down after 9 hours - had severe left sided hip pain when attempting to bear weight. No fractures on imaging. ROCKY resolved after IVF.      Updates 8/4  - No new updates  - Pending precert    Acute Medical Issues   #ROCKY likely dehydration with possible component of rhabdo   #leukocytosis likely reactive from above   -Cr 1.10 (1.62 on admission)  -WBC 7.9 (12.7 on admission)  -Stop IVF, CTM CK. Electrolytes wnl   -hold home losartan     #mechanical fall   -no fx on CT  -pain control, PT/OT      #asymptomatic pyuria  PT/OT  rocephin     Chronic Medical Issues   #DM  -Adult diet with carb control   -Monitor labs  -Lantus 20 with breakfast and dinner  -Mild SSI     #hx of renal/adrenal mass   -defer to outpatient for now     #CKD3a  -Monitor daily renal labs      #HTN  -cont amlodipine  -hold losartan     #HLD  -continue  atorvastatin     F: PO intake & IVF PRN   E: Replete as needed   N: Regular diet  GI ppx: N/A  DVT ppx: Heparin  Antibiotics: ceftriaxone in ED  Tubes/Lines/Drains: none     Dispo: Medically cleared, pending PT/OT eval for placement    Davis Palmer MD

## 2024-08-05 VITALS
WEIGHT: 216 LBS | OXYGEN SATURATION: 94 % | HEIGHT: 66 IN | HEART RATE: 81 BPM | DIASTOLIC BLOOD PRESSURE: 71 MMHG | RESPIRATION RATE: 18 BRPM | TEMPERATURE: 97.3 F | BODY MASS INDEX: 34.72 KG/M2 | SYSTOLIC BLOOD PRESSURE: 132 MMHG

## 2024-08-05 PROBLEM — N39.0 URINARY TRACT INFECTION WITHOUT HEMATURIA, SITE UNSPECIFIED: Status: RESOLVED | Noted: 2024-08-02 | Resolved: 2024-08-05

## 2024-08-05 LAB
ALBUMIN SERPL BCP-MCNC: 3.2 G/DL (ref 3.4–5)
ANION GAP SERPL CALC-SCNC: 14 MMOL/L (ref 10–20)
BACTERIA UR CULT: ABNORMAL
BUN SERPL-MCNC: 32 MG/DL (ref 6–23)
CALCIUM SERPL-MCNC: 8.5 MG/DL (ref 8.6–10.3)
CHLORIDE SERPL-SCNC: 109 MMOL/L (ref 98–107)
CO2 SERPL-SCNC: 25 MMOL/L (ref 21–32)
CREAT SERPL-MCNC: 1.06 MG/DL (ref 0.5–1.05)
EGFRCR SERPLBLD CKD-EPI 2021: 52 ML/MIN/1.73M*2
ERYTHROCYTE [DISTWIDTH] IN BLOOD BY AUTOMATED COUNT: 14 % (ref 11.5–14.5)
GLUCOSE BLD MANUAL STRIP-MCNC: 102 MG/DL (ref 74–99)
GLUCOSE BLD MANUAL STRIP-MCNC: 117 MG/DL (ref 74–99)
GLUCOSE BLD MANUAL STRIP-MCNC: 45 MG/DL (ref 74–99)
GLUCOSE SERPL-MCNC: 55 MG/DL (ref 74–99)
HCT VFR BLD AUTO: 38.4 % (ref 36–46)
HGB BLD-MCNC: 11.9 G/DL (ref 12–16)
MAGNESIUM SERPL-MCNC: 1.74 MG/DL (ref 1.6–2.4)
MCH RBC QN AUTO: 27.9 PG (ref 26–34)
MCHC RBC AUTO-ENTMCNC: 31 G/DL (ref 32–36)
MCV RBC AUTO: 90 FL (ref 80–100)
NRBC BLD-RTO: 0 /100 WBCS (ref 0–0)
PHOSPHATE SERPL-MCNC: 3.2 MG/DL (ref 2.5–4.9)
PLATELET # BLD AUTO: 218 X10*3/UL (ref 150–450)
POTASSIUM SERPL-SCNC: 3.9 MMOL/L (ref 3.5–5.3)
RBC # BLD AUTO: 4.26 X10*6/UL (ref 4–5.2)
SODIUM SERPL-SCNC: 144 MMOL/L (ref 136–145)
WBC # BLD AUTO: 8.5 X10*3/UL (ref 4.4–11.3)

## 2024-08-05 PROCEDURE — 2500000001 HC RX 250 WO HCPCS SELF ADMINISTERED DRUGS (ALT 637 FOR MEDICARE OP)

## 2024-08-05 PROCEDURE — 99239 HOSP IP/OBS DSCHRG MGMT >30: CPT

## 2024-08-05 PROCEDURE — 85027 COMPLETE CBC AUTOMATED: CPT

## 2024-08-05 PROCEDURE — 83735 ASSAY OF MAGNESIUM: CPT

## 2024-08-05 PROCEDURE — 36415 COLL VENOUS BLD VENIPUNCTURE: CPT

## 2024-08-05 PROCEDURE — 2500000002 HC RX 250 W HCPCS SELF ADMINISTERED DRUGS (ALT 637 FOR MEDICARE OP, ALT 636 FOR OP/ED)

## 2024-08-05 PROCEDURE — 2500000004 HC RX 250 GENERAL PHARMACY W/ HCPCS (ALT 636 FOR OP/ED)

## 2024-08-05 PROCEDURE — 80069 RENAL FUNCTION PANEL: CPT

## 2024-08-05 PROCEDURE — 82947 ASSAY GLUCOSE BLOOD QUANT: CPT

## 2024-08-05 PROCEDURE — 2500000005 HC RX 250 GENERAL PHARMACY W/O HCPCS

## 2024-08-05 ASSESSMENT — COGNITIVE AND FUNCTIONAL STATUS - GENERAL
MOBILITY SCORE: 13
PERSONAL GROOMING: A LITTLE
STANDING UP FROM CHAIR USING ARMS: A LOT
TOILETING: A LITTLE
WALKING IN HOSPITAL ROOM: A LOT
HELP NEEDED FOR BATHING: A LITTLE
EATING MEALS: A LITTLE
MOVING TO AND FROM BED TO CHAIR: A LOT
DRESSING REGULAR LOWER BODY CLOTHING: A LITTLE
DAILY ACTIVITIY SCORE: 18
MOVING FROM LYING ON BACK TO SITTING ON SIDE OF FLAT BED WITH BEDRAILS: A LITTLE
DRESSING REGULAR UPPER BODY CLOTHING: A LITTLE
TURNING FROM BACK TO SIDE WHILE IN FLAT BAD: A LITTLE
CLIMB 3 TO 5 STEPS WITH RAILING: TOTAL

## 2024-08-05 ASSESSMENT — PAIN SCALES - GENERAL: PAINLEVEL_OUTOF10: 2

## 2024-08-05 NOTE — CONSULTS
Wound Care Consult     Visit Date: 8/5/2024      Patient Name: Mariaelena Ruiz         MRN: [id number]           YOB: 1940     Reason for Consult:   Facial wounds       Wound History:   Mechanical fall 8-2-2024     Pertinent Labs:   Albumin   Date Value Ref Range Status   08/05/2024 3.2 (L) 3.4 - 5.0 g/dL Final     ALBUMIN (MG/L) IN URINE   Date Value Ref Range Status   07/14/2023 16.8 Not Established mg/L Final       Wound Assessment:  Wound 08/02/24 Face Left;Upper (Active)   Wound Image   08/02/24 1633   Drainage Description None 08/02/24 1641   Drainage Amount None 08/02/24 1641   Dressing Open to air 08/04/24 0900       Wound 08/02/24 Right;Anterior (Active)   Wound Image   08/02/24 1634   Shape BRITTNEY 08/05/24 1000   Drainage Description None 08/02/24 1641   Drainage Amount None 08/02/24 1641   Dressing Open to air 08/04/24 0900       Wound Team Summary Assessment:   Delightful CF shared that she lost her balance and fell onto a carpeted floor, was able to move but unable to get up until son arrived 9 hours later.  We discussed pressure injuries and skin was assessed, no evidence of PI seen.  Mariaelena was able to move well in bed with minimal assist for this.  She does have 2 abraded areas to left forehead, each 1.5 x 1 x 0.1 cm in size, dry and taunt appearing.  Goal:  moist healing.  Orders obtained for Xeroform, 2x2 gauze, Tegaderm film and wound care was provided.     Wound Team Plan: Left forehead wounds - Xeroform, 2x2 gauze, Tegaderm film, change daily until healed.     Dc Vanegas RN  8/5/2024  4:42 PM

## 2024-08-05 NOTE — CARE PLAN
The patient's goals for the shift include rest and pain control.    The clinical goals for the shift include patient up in chair this shift    Problem: Pain - Adult  Goal: Verbalizes/displays adequate comfort level or baseline comfort level  Outcome: Progressing     Problem: Safety - Adult  Goal: Free from fall injury  Outcome: Progressing     Problem: Discharge Planning  Goal: Discharge to home or other facility with appropriate resources  Outcome: Progressing     Problem: Chronic Conditions and Co-morbidities  Goal: Patient's chronic conditions and co-morbidity symptoms are monitored and maintained or improved  Outcome: Progressing     Problem: Skin  Goal: Decreased wound size/increased tissue granulation at next dressing change  Outcome: Progressing  Goal: Participates in plan/prevention/treatment measures  Outcome: Progressing  Goal: Prevent/manage excess moisture  Outcome: Progressing  Goal: Prevent/minimize sheer/friction injuries  Outcome: Progressing  Goal: Promote/optimize nutrition  Outcome: Progressing  Goal: Promote skin healing  Outcome: Progressing     Problem: Fall/Injury  Goal: Not fall by end of shift  Outcome: Progressing  Goal: Be free from injury by end of the shift  Outcome: Progressing  Goal: Verbalize understanding of personal risk factors for fall in the hospital  Outcome: Progressing  Goal: Verbalize understanding of risk factor reduction measures to prevent injury from fall in the home  Outcome: Progressing  Goal: Use assistive devices by end of the shift  Outcome: Progressing  Goal: Pace activities to prevent fatigue by end of the shift  Outcome: Progressing     Problem: Pain  Goal: Takes deep breaths with improved pain control throughout the shift  Outcome: Progressing  Goal: Turns in bed with improved pain control throughout the shift  Outcome: Progressing  Goal: Walks with improved pain control throughout the shift  Outcome: Progressing  Goal: Performs ADL's with improved pain control  throughout shift  Outcome: Progressing  Goal: Participates in PT with improved pain control throughout the shift  Outcome: Progressing  Goal: Free from opioid side effects throughout the shift  Outcome: Progressing  Goal: Free from acute confusion related to pain meds throughout the shift  Outcome: Progressing

## 2024-08-05 NOTE — NURSING NOTE
Report  given to [place]. Patient IV discontinued. Patient transported to [place] at this time.

## 2024-08-05 NOTE — CARE PLAN
The patient's goals for the shift include  patient will ring for assistance.    The clinical goals for the shift include Pt will have pain controlled this shift.

## 2024-08-05 NOTE — PROGRESS NOTES
08/05/24 0810   Discharge Planning   Living Arrangements Alone   Support Systems Children   Assistance Needed A&Ox3, independent at baseline with a rollator/ walker; has a shower chair, elevated toilet seat and grab bars, drives; room air baseline and currently room air   Type of Residence Private residence   Number of Stairs to Enter Residence 9   Number of Stairs Within Residence 2   Do you have animals or pets at home? No   Who is requesting discharge planning? Provider   Home or Post Acute Services Post acute facilities (Rehab/SNF/etc)   Type of Post Acute Facility Services Skilled nursing;Rehab   Expected Discharge Disposition SNF  ([facility] unable to accept patient. Per patient preference, referral sent to [facility] for review. [facility] is out of network. Awaiting response from [facility]. Precert needed for both rehab and snf.)   Does the patient need discharge transport arranged? Yes   RoundTrip coordination needed? Yes   Has discharge transport been arranged? No        08/05/24 0844   Discharge Planning   Expected Discharge Disposition SNF  (Made patient aware [facility] denied. Pt really wants [facility] (awaiting thier response still). SNF pref is now 1)[facility] 2)[facility] - referrals sent to snf's for review)      08/05/24 1000   Discharge Planning   Expected Discharge Disposition SNF  (Acute rehabs denied. Patient aware and fine with snf ([facility]). [facility] starting precert now with [facility]. Medical team aware likely auth today and will complete dc paperwork.)      08/05/24 1100   Discharge Planning   Expected Discharge Disposition SNF  (Auth received for [facility]. Pt aware of dc and is fine with dc-IMM obtained. Transport confirmed for 5pm stretcher with [facility]. At this time, AVS,orders and 7000 NOT completed)      08/05/24 1300   Discharge Planning   Expected Discharge Disposition SNF  (Auth received for [facility]. AVS and orders sent to [facility] and Teresa  Walter at [facility] to complete 7000 in HENs)

## 2024-08-05 NOTE — DISCHARGE INSTRUCTIONS
You are now stable enough to be discharged home.    The following recommendations are made for you at time of hospital discharge:  - Please take your home medications as instructed prior to hospitalization.   - No changes to your home medications have been made during your hospital stay.  - Please follow-up with your primary care provider within 5-7 days from time of discharge. Likely will need to bring photo ID and insurance card to your appointment.   - Please reschedule your renal/adrenal ultrasound that was scheduled for 8/5/24.  - [facility] to make an appointment for you however if you do not hear back from them within 2-3 days, please call [phone] to find out about appointments with [facility] services.  - If you experience any worsening symptoms or any new acute concerns arise, please contact your primary care provider to discuss and possibly arrange an appointment. If you cannot get in touch with provider or severe symptoms are present, please return to nearest emergency room/urgent care for evaluation and treatment.

## 2024-08-05 NOTE — DISCHARGE SUMMARY
"Discharge Diagnosis  ROCKY, mechanical fall    Issues Requiring Follow-Up  - ROCKY resolved with fluids, no fractures or acute trauma from mechanical fall  - Was scheduled for ultrasound 8/5 for possible renal/adrenal mass, will need to re-schedule    Discharge Meds     Your medication list        CONTINUE taking these medications        Instructions Last Dose Given Next Dose Due   BD Ultra-Fine Short Pen Needle 31 gauge x 5/16\" needle  Generic drug: pen needle, diabetic      USE TO INJECT INSULIN      SUBCUTANEOUSLY TWO TIMES A DAY       insulin syringe-needle U-100 30G X 1/2\" 0.5 mL syringe           BD Insulin Syringe Ultra-Fine 0.5 mL 30 gauge x 1/2\" syringe  Generic drug: insulin syringe-needle U-100      USE TO INJECT INSULIN      SUBCUTANEOUSLY TWO TIMES A DAY       lancets misc      CHECK SUGAR 3X DAILY              ASK your doctor about these medications        Instructions Last Dose Given Next Dose Due   acetaminophen 325 mg tablet  Commonly known as: Tylenol      Take 2 tablets (650 mg) by mouth every 6 hours if needed for mild pain (1 - 3).       amLODIPine 5 mg tablet  Commonly known as: Norvasc      TAKE ONE TABLET BY MOUTH DAILY       aspirin 81 mg chewable tablet      Chew 1 tablet (81 mg) once daily. Do not start before December 8, 2023.       atorvastatin 20 mg tablet  Commonly known as: Lipitor      TAKE 1 TABLET BY MOUTH EVERY DAY       bromfenac 0.09 % ophthalmic solution  Commonly known as: Xibrom           coenzyme Q-10 100 mg capsule           DULoxetine 60 mg DR capsule  Commonly known as: Cymbalta      TAKE 1 CAPSULE BY MOUTH DAILY       gabapentin 300 mg capsule  Commonly known as: Neurontin           insulin glargine 100 unit/mL injection  Commonly known as: Lantus U-100 Insulin      INJECT SUBCUTANEOUSLY 35   UNITS IN THE MORNING AND 20UNITS IN THE EVENING       insulin lispro 100 unit/mL injection  Commonly known as: HumaLOG KwikPen Insulin      Inject 15 units before breakfast and 10 units " before dinner       losartan 100 mg tablet  Commonly known as: Cozaar      Take 1 tablet (100 mg) by mouth once daily.       MULTI VITAMIN ORAL           OneTouch Ultra Test strip  Generic drug: blood sugar diagnostic      Check sugar twice a day       UNABLE TO FIND                    Test Results Pending At Discharge  Pending Labs       No current pending labs.            Hospital Course  Mariaelena Ruiz is a 83 y.o. female PMHx insulin-dependent T2DM (A1c 7.7 [5/2024]), HTN, HLD, CKD and adrenal/renal mass who presented to the hospital after a mechanical fall on her L hip in her home without LOC or prodromal symptoms. Was found down after 9 hours - had severe left sided hip pain when attempting to bear weight.     In ED, labs were notable for BUN 39, Cr 1.62 (baseline 1-1.3), AST 62, CK 1953, lactate 2.4, troponin 97-> 77, UA with positive leukocyte esterase but no subjective symptoms of UTI. Patient was given ceftriaxone and 2L IVF in ED. No fractures or acute changes on imaging.     While on floors, patient's ROCKY (likely due to dehydration while down) resolved. Patient improved subjectively and felt comfortable ambulating without significant pain. She was discharged to SNF ([facility]) with plans to follow-up with primary care provider.    Patient advised prior to discharge to follow-up with PCP/Consultants as instructed for routine post discharge follow-up and care. At time of discharge, vital signs stable, physical exam and labs were reviewed. Discharge planning and return precautions were discussed with patient at bedside, all questions were answered to her satisfaction and she verbalized understanding. Patient discharged in stable condition to home.     Pertinent Physical Exam At Time of Discharge  Physical Exam  Constitutional:       Appearance: Normal appearance.   Cardiovascular:      Rate and Rhythm: Normal rate and regular rhythm.      Pulses: Normal pulses.      Heart sounds: Normal heart sounds.    Pulmonary:      Effort: Pulmonary effort is normal.      Breath sounds: Normal breath sounds.   Abdominal:      General: Bowel sounds are normal. There is no distension.      Tenderness: There is no abdominal tenderness.   Musculoskeletal:      Cervical back: Normal range of motion and neck supple.   Skin:     General: Skin is warm and dry.   Neurological:      General: No focal deficit present.      Mental Status: She is alert and oriented to person, place, and time.   Psychiatric:         Mood and Affect: Mood normal.         Behavior: Behavior normal.         Outpatient Follow-Up  Future Appointments   Date Time Provider Department Center   8/5/2024  1:00 PM [facility] ULTRASOUND 1 [facility]   8/9/2024  9:00 AM Macrina Herrera PA-C [id number] [facility]   8/22/2024  2:00 PM Randy Ellis MD [facility]   9/18/2024  2:00 PM Bill Garland DO [id number] [facility]   9/30/2024  4:00 PM Fang Dasilva MD [id number] [facility]         Toño Larsen MD  PGY-1 Transitional

## 2024-08-08 ENCOUNTER — NURSING HOME VISIT (OUTPATIENT)
Dept: POST ACUTE CARE | Facility: EXTERNAL LOCATION | Age: 84
End: 2024-08-08
Payer: MEDICARE

## 2024-08-08 DIAGNOSIS — F32.A DEPRESSION, UNSPECIFIED DEPRESSION TYPE: ICD-10-CM

## 2024-08-08 DIAGNOSIS — Z79.4 TYPE 2 DIABETES MELLITUS WITH DIABETIC POLYNEUROPATHY, WITH LONG-TERM CURRENT USE OF INSULIN (MULTI): ICD-10-CM

## 2024-08-08 DIAGNOSIS — I10 ESSENTIAL HYPERTENSION: ICD-10-CM

## 2024-08-08 DIAGNOSIS — M13.0 POLYARTHRITIS: ICD-10-CM

## 2024-08-08 DIAGNOSIS — E56.9 VITAMIN DEFICIENCY: ICD-10-CM

## 2024-08-08 DIAGNOSIS — R60.9 EDEMA, UNSPECIFIED TYPE: ICD-10-CM

## 2024-08-08 DIAGNOSIS — E11.42 TYPE 2 DIABETES MELLITUS WITH DIABETIC POLYNEUROPATHY, WITH LONG-TERM CURRENT USE OF INSULIN (MULTI): ICD-10-CM

## 2024-08-08 DIAGNOSIS — M62.81 MUSCLE WEAKNESS (GENERALIZED): Primary | ICD-10-CM

## 2024-08-08 PROCEDURE — 99310 SBSQ NF CARE HIGH MDM 45: CPT | Performed by: NURSE PRACTITIONER

## 2024-08-12 ENCOUNTER — NURSING HOME VISIT (OUTPATIENT)
Dept: POST ACUTE CARE | Facility: EXTERNAL LOCATION | Age: 84
End: 2024-08-12
Payer: MEDICARE

## 2024-08-12 VITALS
HEART RATE: 75 BPM | DIASTOLIC BLOOD PRESSURE: 49 MMHG | TEMPERATURE: 97.7 F | OXYGEN SATURATION: 94 % | RESPIRATION RATE: 18 BRPM | SYSTOLIC BLOOD PRESSURE: 146 MMHG

## 2024-08-12 DIAGNOSIS — I12.9 HYPERTENSION ASSOCIATED WITH STAGE 3 CHRONIC KIDNEY DISEASE DUE TO TYPE 2 DIABETES MELLITUS (MULTI): Primary | ICD-10-CM

## 2024-08-12 DIAGNOSIS — N18.30 HYPERTENSION ASSOCIATED WITH STAGE 3 CHRONIC KIDNEY DISEASE DUE TO TYPE 2 DIABETES MELLITUS (MULTI): Primary | ICD-10-CM

## 2024-08-12 DIAGNOSIS — E11.3213 TYPE 2 DIABETES MELLITUS WITH BOTH EYES AFFECTED BY MILD NONPROLIFERATIVE RETINOPATHY AND MACULAR EDEMA, WITH LONG-TERM CURRENT USE OF INSULIN (MULTI): ICD-10-CM

## 2024-08-12 DIAGNOSIS — Z79.4 TYPE 2 DIABETES MELLITUS WITH BOTH EYES AFFECTED BY MILD NONPROLIFERATIVE RETINOPATHY AND MACULAR EDEMA, WITH LONG-TERM CURRENT USE OF INSULIN (MULTI): ICD-10-CM

## 2024-08-12 DIAGNOSIS — E11.22 HYPERTENSION ASSOCIATED WITH STAGE 3 CHRONIC KIDNEY DISEASE DUE TO TYPE 2 DIABETES MELLITUS (MULTI): Primary | ICD-10-CM

## 2024-08-12 PROCEDURE — 99305 1ST NF CARE MODERATE MDM 35: CPT | Performed by: FAMILY MEDICINE

## 2024-08-12 ASSESSMENT — ENCOUNTER SYMPTOMS
CONSTIPATION: 0
UNEXPECTED WEIGHT CHANGE: 0
PALPITATIONS: 0
VOMITING: 0

## 2024-08-12 NOTE — PROGRESS NOTES
*Provider Impression*    Patient is an 83 year old female who is seen today for management of multiple medical problems    #Weakness / OA - PT/OT, acetaminophen 650mg q6h PRN, turmeric 500mg QOD  #HTN / Edema - losartan 100mg daily, amlodipine 5mg daily, ASA 81mg daily, atorvastatin 20mg daily, CoQ10 100mg daily, add torsemide 10mg daily, add CBC, CMP, TSH, BNP in am (addendum: d/c torsemide, continue lasix 20mg daily)  #T2DM w/ neuropathy - lantus 35units daily, Humalog 15units daily and 10units QPM, gabapentin 600mg QHS,   #Vitamin deficiency - multivitamin daily  #Cataract - mgmt per ophthalmology - bromfenac  #Depression - duloxetine 60mg daily  #ACP - DNRCC-A     Follow up as needed      *Chief Complaint*     weakness, edema    *History of Present Illness*    Patient is an 82 y/o female w/ PMH as below who presented to the hospital after a mechanical fall on her L hip in her home without LOC or prodromal symptoms. Was found down after 9 hours and had severe left sided hip pain when attempting to bear weight so was taken to the ED where labs were notable for BUN 39, Cr 1.62 (baseline 1-1.3), AST 62, CK 1953, lactate 2.4, troponin 97-> 77, UA with positive leukocyte esterase but no subjective symptoms of UTI. She was given ceftriaxone and 2L IVF in ED. No fractures or acute changes on imaging. Her ROCKY  was determined likely due to dehydration while down, but resolved w/ IVFs. Patient improved subjectively and felt comfortable ambulating without significant pain. She was discharged to [facility] on 8/5.    She is seen sitting up in her room today and reprots some edema, no CP, SOB, f/c, sweats, n/v, constipation, diarrhea, LUTS, or any other new c/o presently.     Later in the day it was noted that the agency nurse had contacted Dr. Villagomez directly during my call hours to report edema. He had ordered lasix, so there were two conflicting loop diuretic orders. After several attempts made to address the  duplicate therapy without response, so I directly ordered to d/c the torsemide.    Allergies - NKA  PMH - HTN, dyslipidemia, obesity, T2DM, adrenal mass, cholelithiasis, right renal mass, CKD, lumbar discitis, OA, adhesive capsulitis, left lumbar radiculopathy, spondylosis, rosacea  PSH - bunionectomy, colonoscopy, cystoscopy, hip surgery, laminectomy  FH - none reported  SocHx - Never smoker, No EtOH    *Review of Systems*  All other systems reviewed are negative except as noted in the HPI     *Vital Signs*   Date: 8/8/24  - T: 97.3  P: 82  R: 18  BP: 155/69  SpO2: 93% on RA    *Results / Data*  CBC - Date: 8/7/24  WBC: 9.85  HGB: 13.1  HCT: 42.6  PLT: 238  ;   BMP - Date: 8/7/24  Na: 144  K: 3.9  Cl: 105  CO2: 28  BUN: 24  Cr: 0.95  Glu: 82  Ca: 8.9  ;   LFT - Date: 8/7/24  AST: 39  ALT: 26  ALP: 94  Tbili: 0.4  ;   Coags - Date:   INR:   PT:    *Physical Exam*  Gen: (+) NAD, (+) well-appearing  HEENT: (+) normocephalic, (+) MMM  Neck: (+) supple  Lungs: (+) CTAB, (-) wheezes, (-) rales, (-) rhonchi  Heart: (+) RRR, (+) S1 S2, (-) murmurs  Pulses: (+) palpable  Abd: (+) soft, (+) NT, (+) ND, (+) BS+  Ext: (-) edema, (-) deformity  MSK: (-) joint swelling  Skin: (+) warm, (+) dry, (-) rash  Neuro: (+) follows commands, (-) tremor, (+) alert

## 2024-08-12 NOTE — PROGRESS NOTES
Subjective   Patient ID: Mariaelena Ruiz is a 83 y.o. female who is acute skilled care being seen and evaluated for multiple medical problems.    HPI   Patient status post hospitalization for acute kidney injury and mechanical fall  No CP SOB edema  Feeling better    Review of Systems   Constitutional:  Negative for unexpected weight change.   HENT:  Negative for congestion and ear discharge.    Cardiovascular:  Negative for chest pain and palpitations.   Gastrointestinal:  Negative for constipation and vomiting.   All other systems reviewed and are negative.      Objective   BP (!) 146/49   Pulse 75   Temp 36.5 °C (97.7 °F)   Resp 18   SpO2 94%     Physical Exam  Constitutional:       General: She is not in acute distress.     Appearance: Normal appearance.   HENT:      Head: Normocephalic and atraumatic.   Eyes:      Conjunctiva/sclera: Conjunctivae normal.   Cardiovascular:      Rate and Rhythm: Normal rate and regular rhythm.   Pulmonary:      Effort: Pulmonary effort is normal.      Breath sounds: Normal breath sounds.   Abdominal:      Palpations: Abdomen is soft.   Musculoskeletal:      Cervical back: Neck supple.   Lymphadenopathy:      Cervical: No cervical adenopathy.   Neurological:      Mental Status: She is alert.         Assessment/Plan   Problem List Items Addressed This Visit             ICD-10-CM    Hypertension associated with stage 3 chronic kidney disease due to type 2 diabetes mellitus (Multi) - Primary E11.22, I12.9, N18.30     Treated and controlled         Type 2 diabetes mellitus with mild nonproliferative diabetic retinopathy with macular edema, bilateral (Multi) E11.3213     Follow sugars  Controlled          ROCKY is improved  Renal neoplasm and right adrenal adenoma with further workup as outpatient  PT OT ST  Discharge planning  Recheck 1 week sooner if any issues

## 2024-08-12 NOTE — LETTER
Patient: Mariaelena Ruiz  : 1940    Encounter Date: 2024    Subjective  Patient ID: Mariaelena Ruiz is a 83 y.o. female who is acute skilled care being seen and evaluated for multiple medical problems.    HPI   Patient status post hospitalization for acute kidney injury and mechanical fall  No CP SOB edema  Feeling better    Review of Systems   Constitutional:  Negative for unexpected weight change.   HENT:  Negative for congestion and ear discharge.    Cardiovascular:  Negative for chest pain and palpitations.   Gastrointestinal:  Negative for constipation and vomiting.   All other systems reviewed and are negative.      Objective  BP (!) 146/49   Pulse 75   Temp 36.5 °C (97.7 °F)   Resp 18   SpO2 94%     Physical Exam  Constitutional:       General: She is not in acute distress.     Appearance: Normal appearance.   HENT:      Head: Normocephalic and atraumatic.   Eyes:      Conjunctiva/sclera: Conjunctivae normal.   Cardiovascular:      Rate and Rhythm: Normal rate and regular rhythm.   Pulmonary:      Effort: Pulmonary effort is normal.      Breath sounds: Normal breath sounds.   Abdominal:      Palpations: Abdomen is soft.   Musculoskeletal:      Cervical back: Neck supple.   Lymphadenopathy:      Cervical: No cervical adenopathy.   Neurological:      Mental Status: She is alert.         Assessment/Plan  Problem List Items Addressed This Visit             ICD-10-CM    Hypertension associated with stage 3 chronic kidney disease due to type 2 diabetes mellitus (Multi) - Primary E11.22, I12.9, N18.30     Treated and controlled         Type 2 diabetes mellitus with mild nonproliferative diabetic retinopathy with macular edema, bilateral (Multi) E11.3213     Follow sugars  Controlled          ROCKY is improved  Renal neoplasm and right adrenal adenoma with further workup as outpatient  PT OT ST  Discharge planning  Recheck 1 week sooner if any issues      Electronically Signed By: Randy Villagomez,  MD   8/12/24  8:06 AM

## 2024-08-16 ENCOUNTER — DOCUMENTATION (OUTPATIENT)
Dept: CARE COORDINATION | Facility: CLINIC | Age: 84
End: 2024-08-16
Payer: MEDICARE

## 2024-08-16 ENCOUNTER — PATIENT OUTREACH (OUTPATIENT)
Dept: CARE COORDINATION | Facility: CLINIC | Age: 84
End: 2024-08-16
Payer: MEDICARE

## 2024-08-16 NOTE — PROGRESS NOTES
Discharge Facility: [facility]  Discharge Diagnosis: Urinary tract infection, site not specified  Admission Date: 8/5/24  Discharge Date: 8/15/24    PCP Appointment Date: 8/23/24  Specialist Appointment Date: n/a  Hospital Encounter and Summary Linked: No  See discharge assessment below for further details    Medications  Medications reviewed with patient/caregiver?: Yes (8/16/2024 10:41 AM)  Is the patient having any side effects they believe may be caused by any medication additions or changes?: No (8/16/2024 10:41 AM)  Prescription Comments: patient denies any changes (8/16/2024 10:41 AM)    Appointments  Does the patient have a primary care provider?: Yes (8/16/2024 10:41 AM)  Care Management Interventions: Verified appointment date/time/provider (8/16/2024 10:41 AM)    Self Management  What is the home health agency?: [facility] (8/16/2024 10:41 AM)  Has home health visited the patient within 72 hours of discharge?: Yes (present with patient at this time) (8/16/2024 10:41 AM)    Patient Teaching  Does the patient have access to their discharge instructions?: Yes (8/16/2024 10:41 AM)  What is the patient's perception of their health status since discharge?: Improving (8/16/2024 10:41 AM)  Patient/Caregiver Education Comments: Patient states she is having a harder time getting around than she typically does, she is using rollator or walker, home care active, patient reports she has family assistance as needed. No discharge instructions/med list available due to SNF discharge, encouraged to bring paperwork to PCP appt if needed. She denies any medication changes. (8/16/2024 10:41 AM)

## 2024-08-20 ENCOUNTER — TELEPHONE (OUTPATIENT)
Dept: UROLOGY | Facility: CLINIC | Age: 84
End: 2024-08-20
Payer: MEDICARE

## 2024-08-20 NOTE — TELEPHONE ENCOUNTER
Patient wanting results of her scan that was done in February I believe, I don't thinks she is sure.  She had appt. with you on Thursday 8/22, but has just been released from hospital.  Asking if you could call her please   Thank you

## 2024-08-22 ENCOUNTER — APPOINTMENT (OUTPATIENT)
Dept: UROLOGY | Facility: CLINIC | Age: 84
End: 2024-08-22
Payer: MEDICARE

## 2024-08-23 ENCOUNTER — APPOINTMENT (OUTPATIENT)
Dept: PRIMARY CARE | Facility: CLINIC | Age: 84
End: 2024-08-23
Payer: MEDICARE

## 2024-08-23 DIAGNOSIS — M46.46 DISCITIS OF LUMBAR REGION: Primary | ICD-10-CM

## 2024-08-23 DIAGNOSIS — M54.32 SCIATICA OF LEFT SIDE: ICD-10-CM

## 2024-08-23 DIAGNOSIS — E11.3213 TYPE 2 DIABETES MELLITUS WITH MILD NONPROLIFERATIVE RETINOPATHY OF BOTH EYES AND MACULAR EDEMA, UNSPECIFIED WHETHER LONG TERM INSULIN USE (MULTI): ICD-10-CM

## 2024-08-23 DIAGNOSIS — E11.3213 TYPE 2 DIABETES MELLITUS WITH BOTH EYES AFFECTED BY MILD NONPROLIFERATIVE RETINOPATHY AND MACULAR EDEMA, WITH LONG-TERM CURRENT USE OF INSULIN (MULTI): ICD-10-CM

## 2024-08-23 DIAGNOSIS — Z79.4 TYPE 2 DIABETES MELLITUS WITH BOTH EYES AFFECTED BY MILD NONPROLIFERATIVE RETINOPATHY AND MACULAR EDEMA, WITH LONG-TERM CURRENT USE OF INSULIN (MULTI): ICD-10-CM

## 2024-08-23 PROCEDURE — 99442 PR PHYS/QHP TELEPHONE EVALUATION 11-20 MIN: CPT | Performed by: INTERNAL MEDICINE

## 2024-08-23 PROCEDURE — 1111F DSCHRG MED/CURRENT MED MERGE: CPT | Performed by: INTERNAL MEDICINE

## 2024-08-23 PROCEDURE — 1160F RVW MEDS BY RX/DR IN RCRD: CPT | Performed by: INTERNAL MEDICINE

## 2024-08-23 PROCEDURE — 1036F TOBACCO NON-USER: CPT | Performed by: INTERNAL MEDICINE

## 2024-08-23 PROCEDURE — 1157F ADVNC CARE PLAN IN RCRD: CPT | Performed by: INTERNAL MEDICINE

## 2024-08-23 PROCEDURE — 1159F MED LIST DOCD IN RCRD: CPT | Performed by: INTERNAL MEDICINE

## 2024-08-23 PROCEDURE — 1123F ACP DISCUSS/DSCN MKR DOCD: CPT | Performed by: INTERNAL MEDICINE

## 2024-08-23 RX ORDER — INSULIN GLARGINE 100 [IU]/ML
INJECTION, SOLUTION SUBCUTANEOUS
Qty: 60 ML | Refills: 2 | Status: SHIPPED | OUTPATIENT
Start: 2024-08-23

## 2024-08-23 RX ORDER — GABAPENTIN 300 MG/1
600 CAPSULE ORAL EVERY MORNING
Qty: 180 CAPSULE | Refills: 2 | Status: SHIPPED | OUTPATIENT
Start: 2024-08-23

## 2024-08-23 RX ORDER — INSULIN LISPRO 100 [IU]/ML
INJECTION, SOLUTION INTRAVENOUS; SUBCUTANEOUS
Qty: 30 ML | Refills: 2 | Status: SHIPPED | OUTPATIENT
Start: 2024-08-23

## 2024-08-23 NOTE — PROGRESS NOTES
Subjective   Patient ID: Mariaelena Ruiz is a 83 y.o. female who presents for Post Hospitalization.    HPI     Patient was recently admitted to [facility] for fall and was on the floor for 9 hours. She fell onto her left hip-xray and CT neg for fx. She was found to have ROCKY and rhabdo. Both resolved with IVF. She was discharged to [place]. She has been home for about 1 week. She was evaluated by home OT/PT, but has yet to start OT/PT at home. Currently, she is having generalized weakness and trouble ambulating    Phone visit today as patient is too weak to get to office.    Review of Systems   All other systems reviewed and are negative.      Objective   There were no vitals taken for this visit.    Physical Exam    NAD  Talks in complete sentences  Mood and affect are appropriate       Assessment/Plan       #Fall, hospital follow up   -Cont PT/OT   -Lantus, humalog and gabapentin refilled     Inpatient notes, imaging reports and labs reviewed.

## 2024-08-30 ENCOUNTER — PATIENT OUTREACH (OUTPATIENT)
Dept: PRIMARY CARE | Facility: CLINIC | Age: 84
End: 2024-08-30
Payer: MEDICARE

## 2024-08-30 NOTE — PROGRESS NOTES
Call regarding appt. with PCP on (8/23/24) after hospitalization.  At time of outreach call the patient feels as if their condition has (improved) since last visit.  Reviewed the PCP appointment with the pt and addressed any questions or concerns.    Patient reports progress is slow but she is managing at home, using a walker and rollator. She is happy to have received some home care for therapy.

## 2024-09-18 ENCOUNTER — APPOINTMENT (OUTPATIENT)
Dept: PRIMARY CARE | Facility: CLINIC | Age: 84
End: 2024-09-18
Payer: MEDICARE

## 2024-09-18 DIAGNOSIS — R19.7 DIARRHEA, UNSPECIFIED TYPE: Primary | ICD-10-CM

## 2024-09-18 RX ORDER — MONTELUKAST SODIUM 4 MG/1
1 TABLET, CHEWABLE ORAL
Qty: 60 TABLET | Refills: 1 | Status: SHIPPED | OUTPATIENT
Start: 2024-09-18 | End: 2025-09-18

## 2024-09-18 NOTE — PROGRESS NOTES
Subjective   Patient ID: Mariaelena Ruiz is a 83 y.o. female who presents for No chief complaint on file..    HPI     Renal neoplasm???  Pancreatic cyst???      Review of Systems    Objective   There were no vitals taken for this visit.    Physical Exam    Assessment/Plan   #Renal mass  -Renal US ordered  -Referral to urology    #DM2  -A1c goal <8  -Cont Humalog 15 AC breakfast and 10 AC dinner   -Cont Lantus 35 units in AM, 20 units in PM     #HTN  -Goal BP <130/80  -Cont amlodipine 5mg daily and losartan 100mg daily     #Osteopenia  -Previous right hip fx  -DEXA 6/2022 T-score -2.2  -Recommended Prolia- pt declines at this time     #HLD  -LDL goal <70  -Cont atorvastatin 20mg daily     #CKD3a  -Stable, cont ARB/statin   -Follows with nephrology     #Lumbar radiculopathy   -Cont gabapentin and duloxetine     Influenza:  COVID: x2  TDaP: 2/2009  Pneumovax 23: 11/2012  RSV: never  Shingrix: never  DEXA: 6/2022- osteopenia  Colorectal ca: colonoscopy 9/2016    Tino Novoa, MS4  [facility]

## 2024-09-23 ENCOUNTER — APPOINTMENT (OUTPATIENT)
Dept: UROLOGY | Facility: CLINIC | Age: 84
End: 2024-09-23
Payer: MEDICARE

## 2024-09-23 ENCOUNTER — TELEMEDICINE (OUTPATIENT)
Dept: UROLOGY | Facility: CLINIC | Age: 84
End: 2024-09-23
Payer: MEDICARE

## 2024-09-23 DIAGNOSIS — N28.89 RIGHT RENAL MASS: Primary | ICD-10-CM

## 2024-09-23 PROCEDURE — 1157F ADVNC CARE PLAN IN RCRD: CPT | Performed by: UROLOGY

## 2024-09-23 PROCEDURE — 99212 OFFICE O/P EST SF 10 MIN: CPT | Performed by: UROLOGY

## 2024-09-23 PROCEDURE — 1123F ACP DISCUSS/DSCN MKR DOCD: CPT | Performed by: UROLOGY

## 2024-09-23 NOTE — PROGRESS NOTES
09/23/2024  Telephone interview    Patient voiding well, no flank pain no blood in the urine    Renal image not done recently    We discussed right renal mass, 6 months imaging study with renal ultrasound  All the questions were answered, the patient expressed understanding and agreed to the plan.    Impression  Right renal mass, 2 cm, asymptomatic     Plan  Conservative management for right renal mass  Renal ultrasound for right renal mass follow-up now ,call patient result   right renal ultrasound for right renal mass follow-up in a year  Appointment in a year        Chief Complaint   Patient presents with    renal mass     Patient is here today for a virtual visit ( phone call)         Physical Exam     TODAYS LAB RESULTS:      ASSESSMENT&PLAN:      IMPRESSIONS:    02/16/2024  83-year-old female developed an incidental finding right renal mass about a year ago and the last imaging study 6 months ago 2 cm right renal mass voiding well, no blood in urine no flank pain     Patient has no nausea, no vomiting, no fever.     Creatinine 1.12, BUN 23 2/15/2024     Renal ultrasound 3/21/2023  IMPRESSION:  1. Cholelithiasis with no other sonographic evidence for acute  cholecystitis.  2. Right nephrolithiasis. No right hydronephrosis.  3. 1.8 cm solid-appearing lesion at the right kidney, new since prior  MRI kidneys 01/21/2016. Nonemergent contrast-enhanced MRI recommended  for further characterization.     MRI 7/26/2023  IMPRESSION:  1.  2 cm mildly enhancing cortical mass in the anterior right kidney,  suspicious for neoplasm. No evidence of tumor thrombus or metastatic  disease of the abdomen otherwise.  Persistent 18 mm calculus in the right kidney which appears to  obstruct upper pole major calyx with severe upstream caliectasis and  inflammatory urothelial hyperenhancement. Suggest urologic  consultation for these findings.  2. 4 cm right adrenal adenoma is stable for over a decade. Mild limb  thickening of the left  adrenal gland is also unchanged.  3. Since 2016, development of subcentimeter cystic lesion of the  pancreas. Consider follow-up MRCP in 1 year to document stability.     We discussed right renal lesion, incidental finding asymptomatic  We discussed the CT scan abdomen with and without contrast  We discussed conservative management for right renal mass  All the questions were answered, the patient expressed understanding and agreed to the plan.     Impression  Right renal mass, 2 cm, asymptomatic     Plan  CT abdomen with and without contrast for right renal mass follow-up, call patient result  Renal ultrasound in 6 months for right renal mass follow-up  Appointment in 6 months     New Patient           Chief Complaint   Patient presents with    Nephrolithiasis       Patient was in ER 12/07/23 For Sciatica pain on Left side . She is here to go over results for  mass on Right Kidney . She is voiding well but frequently during the day .          Physical Exam      TODAYS LAB RESULTS:  No Urine Sample      US right upper quadrant  Status: Final result      PACS Images      Show images for US right upper quadrant  Signed by     Signed Time Phone Pager   Abbey Almendarez, DO 3/21/2023 21:22 [phone]      Exam Information     Status Exam Begun Exam Ended   Final 3/21/2023 20:09        Study Result     Narrative & Impression   STUDY:  US RUQ ABDOMEN;  3/21/2023 8:42 pm     INDICATION:  RUQ tenderness on exam .     COMPARISON:  CT abdomen and pelvis 03/21/2023, abdominal ultrasound 02/13/2013.  MRI kidneys 01/21/2016     ACCESSION NUMBER(S):  [id number]     ORDERING CLINICIAN:  ED CORONADO     TECHNIQUE:  Multiple images of the right upper quadrant were obtained.     FINDINGS:  LIVER:  The liver measures  15.1 cm. Echogenicity within normal limits.     GALLBLADDER:  There is cholelithiasis.No gallbladder wall thickening or  pericholecystic fluid.  Negative sonographic Irvin's sign.     BILIARY TREE:  No biliary  ductal dilation. The common bile duct measures  5 mm.     PANCREAS:  The pancreas is obscured by overlying bowel gas.     RIGHT KIDNEY:  The right kidney measures  11 cm in length. There is no  hydronephrosis. There is a 1.7 x 1.0 x 1.6 cm calculus at the  superior pole of the right kidney.  There is a 1.8 x 1.6 x 1.7 cm solid-appearing hyperechoic lesion at  the interpolar region of the right kidney, new since prior MRI  kidneys 01/21/2016.     IMPRESSION:  1. Cholelithiasis with no other sonographic evidence for acute  cholecystitis.  2. Right nephrolithiasis. No right hydronephrosis.  3. 1.8 cm solid-appearing lesion at the right kidney, new since prior  MRI kidneys 01/21/2016. Nonemergent contrast-enhanced MRI recommended  for further characterization.                 Incidental Findings (**[id number]**):  1.8 cm solid-appearing lesion at the right kidney, new since prior  MRI kidneys 01/21/2016. Nonemergent contrast-enhanced MRI recommended  for further characterization.  PURPLE ALERT:  An alert notification was sent to the emergency  department regarding incidental or unexpected imaging finding(s) in  the radiology examination.                        === 07/06/23 ===     MR ABDOMEN RENAL W AND WO CONTRAST     - Impression -  1.  2 cm mildly enhancing cortical mass in the anterior right kidney,  suspicious for neoplasm. No evidence of tumor thrombus or metastatic  disease of the abdomen otherwise.  Persistent 18 mm calculus in the right kidney which appears to  obstruct upper pole major calyx with severe upstream caliectasis and  inflammatory urothelial hyperenhancement. Suggest urologic  consultation for these findings.  2. 4 cm right adrenal adenoma is stable for over a decade. Mild limb  thickening of the left adrenal gland is also unchanged.  3. Since 2016, development of subcentimeter cystic lesion of the  pancreas. Consider follow-up MRCP in 1 year to document stability.

## 2024-09-25 ENCOUNTER — TELEPHONE (OUTPATIENT)
Dept: PRIMARY CARE | Facility: CLINIC | Age: 84
End: 2024-09-25
Payer: MEDICARE

## 2024-09-25 NOTE — TELEPHONE ENCOUNTER
Can you please call to schedule an appt for possible UTI, Mariaelena wanted antibiotics but needs to be seen before thank you!

## 2024-09-26 ENCOUNTER — TELEMEDICINE (OUTPATIENT)
Dept: PRIMARY CARE | Facility: CLINIC | Age: 84
End: 2024-09-26
Payer: MEDICARE

## 2024-09-26 DIAGNOSIS — R30.0 DYSURIA: Primary | ICD-10-CM

## 2024-09-26 PROCEDURE — 99442 PR PHYS/QHP TELEPHONE EVALUATION 11-20 MIN: CPT | Performed by: INTERNAL MEDICINE

## 2024-09-26 PROCEDURE — 1036F TOBACCO NON-USER: CPT | Performed by: INTERNAL MEDICINE

## 2024-09-26 PROCEDURE — 1159F MED LIST DOCD IN RCRD: CPT | Performed by: INTERNAL MEDICINE

## 2024-09-26 PROCEDURE — 1123F ACP DISCUSS/DSCN MKR DOCD: CPT | Performed by: INTERNAL MEDICINE

## 2024-09-26 PROCEDURE — 1160F RVW MEDS BY RX/DR IN RCRD: CPT | Performed by: INTERNAL MEDICINE

## 2024-09-26 PROCEDURE — 1157F ADVNC CARE PLAN IN RCRD: CPT | Performed by: INTERNAL MEDICINE

## 2024-09-26 RX ORDER — CIPROFLOXACIN 250 MG/1
250 TABLET, FILM COATED ORAL 2 TIMES DAILY
Qty: 10 TABLET | Refills: 0 | Status: SHIPPED | OUTPATIENT
Start: 2024-09-26 | End: 2024-10-01

## 2024-09-26 NOTE — PROGRESS NOTES
Subjective   Patient ID: Mariaelena Ruiz is a 83 y.o. female who presents for No chief complaint on file..    HPI     Reports dysuria, urinary urgency and frequency x 4 days. NO fever, chills, flank pain       Review of Systems   All other systems reviewed and are negative.      Objective   There were no vitals taken for this visit.    Physical Exam  NAD  Talks in complete sentences  Mood and affect are appropriate       Assessment/Plan       #Dysuria   -Patient has transportation issues and is unable to come into the office for appt or go to the lab to give UA.   -Will empirically treat with cipro 250mg bid x 5 days (has worked well in the past)  -If sx persist will she call me and will see her in office.

## 2024-09-30 ENCOUNTER — HOSPITAL ENCOUNTER (OUTPATIENT)
Dept: RADIOLOGY | Facility: HOSPITAL | Age: 84
Discharge: HOME | End: 2024-09-30
Payer: MEDICARE

## 2024-09-30 ENCOUNTER — APPOINTMENT (OUTPATIENT)
Dept: NEPHROLOGY | Facility: CLINIC | Age: 84
End: 2024-09-30
Payer: MEDICARE

## 2024-09-30 DIAGNOSIS — N28.89 RIGHT RENAL MASS: ICD-10-CM

## 2024-09-30 PROCEDURE — 76770 US EXAM ABDO BACK WALL COMP: CPT

## 2024-09-30 PROCEDURE — 76770 US EXAM ABDO BACK WALL COMP: CPT | Performed by: RADIOLOGY

## 2024-10-01 ENCOUNTER — APPOINTMENT (OUTPATIENT)
Dept: RADIOLOGY | Facility: HOSPITAL | Age: 84
End: 2024-10-01
Payer: MEDICARE

## 2024-10-03 ENCOUNTER — PATIENT OUTREACH (OUTPATIENT)
Dept: CARE COORDINATION | Facility: CLINIC | Age: 84
End: 2024-10-03
Payer: MEDICARE

## 2024-10-10 ENCOUNTER — TELEPHONE (OUTPATIENT)
Dept: UROLOGY | Facility: CLINIC | Age: 84
End: 2024-10-10
Payer: MEDICARE

## 2024-11-08 DIAGNOSIS — E11.3213 TYPE 2 DIABETES MELLITUS WITH BOTH EYES AFFECTED BY MILD NONPROLIFERATIVE RETINOPATHY AND MACULAR EDEMA, WITH LONG-TERM CURRENT USE OF INSULIN: ICD-10-CM

## 2024-11-08 DIAGNOSIS — E11.3213 TYPE 2 DIABETES MELLITUS WITH MILD NONPROLIFERATIVE RETINOPATHY OF BOTH EYES AND MACULAR EDEMA, UNSPECIFIED WHETHER LONG TERM INSULIN USE: ICD-10-CM

## 2024-11-08 DIAGNOSIS — N18.30 HYPERTENSION ASSOCIATED WITH STAGE 3 CHRONIC KIDNEY DISEASE DUE TO TYPE 2 DIABETES MELLITUS (MULTI): ICD-10-CM

## 2024-11-08 DIAGNOSIS — E78.00 ELEVATED LDL CHOLESTEROL LEVEL: ICD-10-CM

## 2024-11-08 DIAGNOSIS — R19.7 DIARRHEA, UNSPECIFIED TYPE: ICD-10-CM

## 2024-11-08 DIAGNOSIS — E11.22 HYPERTENSION ASSOCIATED WITH STAGE 3 CHRONIC KIDNEY DISEASE DUE TO TYPE 2 DIABETES MELLITUS (MULTI): ICD-10-CM

## 2024-11-08 DIAGNOSIS — M54.32 SCIATICA OF LEFT SIDE: ICD-10-CM

## 2024-11-08 DIAGNOSIS — I10 PRIMARY HYPERTENSION: ICD-10-CM

## 2024-11-08 DIAGNOSIS — M54.16 ACUTE LEFT LUMBAR RADICULOPATHY: ICD-10-CM

## 2024-11-08 DIAGNOSIS — I12.9 HYPERTENSION ASSOCIATED WITH STAGE 3 CHRONIC KIDNEY DISEASE DUE TO TYPE 2 DIABETES MELLITUS (MULTI): ICD-10-CM

## 2024-11-08 DIAGNOSIS — Z79.4 TYPE 2 DIABETES MELLITUS WITH BOTH EYES AFFECTED BY MILD NONPROLIFERATIVE RETINOPATHY AND MACULAR EDEMA, WITH LONG-TERM CURRENT USE OF INSULIN: ICD-10-CM

## 2024-11-12 ENCOUNTER — PATIENT OUTREACH (OUTPATIENT)
Dept: PRIMARY CARE | Facility: CLINIC | Age: 84
End: 2024-11-12
Payer: MEDICARE

## 2024-11-12 RX ORDER — AMLODIPINE BESYLATE 5 MG/1
5 TABLET ORAL DAILY
Qty: 90 TABLET | Refills: 0 | Status: SHIPPED | OUTPATIENT
Start: 2024-11-12

## 2024-11-12 RX ORDER — LOSARTAN POTASSIUM 100 MG/1
100 TABLET ORAL DAILY
Qty: 90 TABLET | Refills: 0 | Status: SHIPPED | OUTPATIENT
Start: 2024-11-12 | End: 2025-11-12

## 2024-11-12 RX ORDER — GABAPENTIN 300 MG/1
600 CAPSULE ORAL EVERY MORNING
Qty: 180 CAPSULE | Refills: 0 | Status: SHIPPED | OUTPATIENT
Start: 2024-11-12

## 2024-11-12 RX ORDER — DULOXETIN HYDROCHLORIDE 60 MG/1
60 CAPSULE, DELAYED RELEASE ORAL DAILY
Qty: 90 CAPSULE | Refills: 0 | Status: SHIPPED | OUTPATIENT
Start: 2024-11-12

## 2024-11-12 RX ORDER — PEN NEEDLE, DIABETIC 30 GX3/16"
NEEDLE, DISPOSABLE MISCELLANEOUS
Qty: 300 EACH | Refills: 3 | Status: SHIPPED | OUTPATIENT
Start: 2024-11-12

## 2024-11-12 RX ORDER — ATORVASTATIN CALCIUM 20 MG/1
20 TABLET, FILM COATED ORAL DAILY
Qty: 90 TABLET | Refills: 0 | Status: SHIPPED | OUTPATIENT
Start: 2024-11-12

## 2024-11-12 RX ORDER — INSULIN GLARGINE 100 [IU]/ML
INJECTION, SOLUTION SUBCUTANEOUS
Qty: 60 ML | Refills: 2 | Status: SHIPPED | OUTPATIENT
Start: 2024-11-12

## 2024-11-12 RX ORDER — INSULIN LISPRO 100 [IU]/ML
INJECTION, SOLUTION INTRAVENOUS; SUBCUTANEOUS
Qty: 30 ML | Refills: 2 | Status: SHIPPED | OUTPATIENT
Start: 2024-11-12

## 2024-11-12 RX ORDER — MONTELUKAST SODIUM 4 MG/1
1 TABLET, CHEWABLE ORAL
Qty: 60 TABLET | Refills: 1 | Status: SHIPPED | OUTPATIENT
Start: 2024-11-12 | End: 2025-11-12

## 2024-11-12 NOTE — PROGRESS NOTES
Final call. Called and spoke with patient to address any questions or concerns regarding hospitalization.   Patient reports their condition has (improved). Encouraged to call if needed.

## 2024-12-18 ENCOUNTER — APPOINTMENT (OUTPATIENT)
Dept: PRIMARY CARE | Facility: CLINIC | Age: 84
End: 2024-12-18
Payer: MEDICARE

## 2024-12-26 ENCOUNTER — APPOINTMENT (OUTPATIENT)
Dept: PRIMARY CARE | Facility: CLINIC | Age: 84
End: 2024-12-26
Payer: MEDICARE

## 2024-12-26 ENCOUNTER — LAB (OUTPATIENT)
Dept: LAB | Facility: LAB | Age: 84
End: 2024-12-26
Payer: MEDICARE

## 2024-12-26 VITALS — SYSTOLIC BLOOD PRESSURE: 134 MMHG | HEART RATE: 85 BPM | OXYGEN SATURATION: 93 % | DIASTOLIC BLOOD PRESSURE: 66 MMHG

## 2024-12-26 DIAGNOSIS — E78.00 ELEVATED LDL CHOLESTEROL LEVEL: ICD-10-CM

## 2024-12-26 DIAGNOSIS — K86.2 PANCREAS CYST (HHS-HCC): ICD-10-CM

## 2024-12-26 DIAGNOSIS — E11.3213 TYPE 2 DIABETES MELLITUS WITH BOTH EYES AFFECTED BY MILD NONPROLIFERATIVE RETINOPATHY AND MACULAR EDEMA, WITH LONG-TERM CURRENT USE OF INSULIN: ICD-10-CM

## 2024-12-26 DIAGNOSIS — N18.31 STAGE 3A CHRONIC KIDNEY DISEASE (MULTI): ICD-10-CM

## 2024-12-26 DIAGNOSIS — Z79.4 TYPE 2 DIABETES MELLITUS WITH BOTH EYES AFFECTED BY MILD NONPROLIFERATIVE RETINOPATHY AND MACULAR EDEMA, WITH LONG-TERM CURRENT USE OF INSULIN: ICD-10-CM

## 2024-12-26 DIAGNOSIS — Z79.4 TYPE 2 DIABETES MELLITUS WITH BOTH EYES AFFECTED BY MILD NONPROLIFERATIVE RETINOPATHY AND MACULAR EDEMA, WITH LONG-TERM CURRENT USE OF INSULIN: Primary | ICD-10-CM

## 2024-12-26 DIAGNOSIS — R74.8 ELEVATED CK: ICD-10-CM

## 2024-12-26 DIAGNOSIS — E11.3213 TYPE 2 DIABETES MELLITUS WITH BOTH EYES AFFECTED BY MILD NONPROLIFERATIVE RETINOPATHY AND MACULAR EDEMA, WITH LONG-TERM CURRENT USE OF INSULIN: Primary | ICD-10-CM

## 2024-12-26 DIAGNOSIS — N17.9 AKI (ACUTE KIDNEY INJURY) (CMS-HCC): ICD-10-CM

## 2024-12-26 LAB
ALBUMIN SERPL BCP-MCNC: 4 G/DL (ref 3.4–5)
ANION GAP SERPL CALC-SCNC: 22 MMOL/L (ref 10–20)
BUN SERPL-MCNC: 25 MG/DL (ref 6–23)
CALCIUM SERPL-MCNC: 9 MG/DL (ref 8.6–10.3)
CHLORIDE SERPL-SCNC: 102 MMOL/L (ref 98–107)
CHOLEST SERPL-MCNC: 136 MG/DL (ref 0–199)
CHOLESTEROL/HDL RATIO: 2.4
CO2 SERPL-SCNC: 25 MMOL/L (ref 21–32)
CREAT SERPL-MCNC: 1 MG/DL (ref 0.5–1.05)
EGFRCR SERPLBLD CKD-EPI 2021: 56 ML/MIN/1.73M*2
GLUCOSE SERPL-MCNC: 196 MG/DL (ref 74–99)
HDLC SERPL-MCNC: 56.1 MG/DL
NON-HDL CHOLESTEROL: 80 MG/DL (ref 0–149)
PHOSPHATE SERPL-MCNC: 3.7 MG/DL (ref 2.5–4.9)
POTASSIUM SERPL-SCNC: 4 MMOL/L (ref 3.5–5.3)
SODIUM SERPL-SCNC: 145 MMOL/L (ref 136–145)

## 2024-12-26 PROCEDURE — 83036 HEMOGLOBIN GLYCOSYLATED A1C: CPT

## 2024-12-26 PROCEDURE — 82465 ASSAY BLD/SERUM CHOLESTEROL: CPT

## 2024-12-26 PROCEDURE — 1123F ACP DISCUSS/DSCN MKR DOCD: CPT | Performed by: INTERNAL MEDICINE

## 2024-12-26 PROCEDURE — 83718 ASSAY OF LIPOPROTEIN: CPT

## 2024-12-26 PROCEDURE — 3078F DIAST BP <80 MM HG: CPT | Performed by: INTERNAL MEDICINE

## 2024-12-26 PROCEDURE — 80069 RENAL FUNCTION PANEL: CPT

## 2024-12-26 PROCEDURE — 1157F ADVNC CARE PLAN IN RCRD: CPT | Performed by: INTERNAL MEDICINE

## 2024-12-26 PROCEDURE — 1036F TOBACCO NON-USER: CPT | Performed by: INTERNAL MEDICINE

## 2024-12-26 PROCEDURE — 3075F SYST BP GE 130 - 139MM HG: CPT | Performed by: INTERNAL MEDICINE

## 2024-12-26 PROCEDURE — 99214 OFFICE O/P EST MOD 30 MIN: CPT | Performed by: INTERNAL MEDICINE

## 2024-12-26 ASSESSMENT — COLUMBIA-SUICIDE SEVERITY RATING SCALE - C-SSRS
6. HAVE YOU EVER DONE ANYTHING, STARTED TO DO ANYTHING, OR PREPARED TO DO ANYTHING TO END YOUR LIFE?: NO
2. HAVE YOU ACTUALLY HAD ANY THOUGHTS OF KILLING YOURSELF?: NO
1. IN THE PAST MONTH, HAVE YOU WISHED YOU WERE DEAD OR WISHED YOU COULD GO TO SLEEP AND NOT WAKE UP?: NO

## 2024-12-26 ASSESSMENT — PATIENT HEALTH QUESTIONNAIRE - PHQ9
SUM OF ALL RESPONSES TO PHQ9 QUESTIONS 1 AND 2: 0
2. FEELING DOWN, DEPRESSED OR HOPELESS: NOT AT ALL
1. LITTLE INTEREST OR PLEASURE IN DOING THINGS: NOT AT ALL

## 2024-12-26 NOTE — PROGRESS NOTES
Subjective   Patient ID: Mariaelena Ruiz is a 84 y.o. female who presents for Follow-up.    HPI     No falls since her hospital 8/2025. Has gotten her strength back.     Sugar 105 this am. No recent lows.     No other new issues       Review of Systems   All other systems reviewed and are negative.      Objective   /66   Pulse 85   SpO2 93%     Physical Exam  Constitutional:       Appearance: Normal appearance.   HENT:      Head: Normocephalic and atraumatic.   Cardiovascular:      Rate and Rhythm: Normal rate.      Heart sounds: Normal heart sounds. No murmur heard.     No gallop.   Pulmonary:      Effort: Pulmonary effort is normal. No respiratory distress.      Breath sounds: No wheezing or rales.   Skin:     General: Skin is warm and dry.      Findings: No rash.   Neurological:      Mental Status: She is alert and oriented to person, place, and time. Mental status is at baseline.   Psychiatric:         Mood and Affect: Mood normal.         Behavior: Behavior normal.         Assessment/Plan       #DM2  -Order A1c today   -Continue Humalog 15 AC breakfast and 10 AC dinner . Cont Lantus 35 units in am and 20 units in PM      #HTN  -Cont amlodipine 5mg daily and losartan 100mg daily     #Osteopenia  -previous right hip rx. Recommended Prolia- patient declines      #HLD  -Cont atorvastatin 20mg daily, LDL at goal <70     #CKD3a  -Stable, cont ARB/statin , had several previous UTIs so will defer SGLT2i  -Check RFP      #right adrenal mass  -Evaluated by Dr. Thomason     #R right mass  -Following with urlogy     #Lumbar radiculopathy   -Cont gabapentin and duloxetine      #Pancreas cyst  -Refer to Jamari Herrera for management      Influenza: Recommended  COVID: x2  Prevnar 13: UTD   Pneumovax 23: UTD   Prevnar 20:Recommended   RSV: Recommended   Shingrix: Recommended  Mamm: 10/25/17- NI  DEXA: 3/11/20- osteopenia   Pap: NI   Colorectal ca: 9/27/16- 10 years      RTC 6 mo

## 2024-12-26 NOTE — PATIENT INSTRUCTIONS
Get fasting labs today     Prevnar 20, RSV, Shingles    See Macrina Herrera for pancreas cyst     6 months

## 2024-12-27 ENCOUNTER — TELEPHONE (OUTPATIENT)
Dept: HEMATOLOGY/ONCOLOGY | Facility: CLINIC | Age: 84
End: 2024-12-27
Payer: MEDICARE

## 2024-12-27 LAB
EST. AVERAGE GLUCOSE BLD GHB EST-MCNC: 143 MG/DL
HBA1C MFR BLD: 6.6 %

## 2024-12-27 NOTE — TELEPHONE ENCOUNTER
Called patient to schedule new patient visit, Patient was unable to reach their phone. Left a voicemail with my information.    Maria Antonia Sierra MA

## 2024-12-30 ENCOUNTER — TELEPHONE (OUTPATIENT)
Dept: HEMATOLOGY/ONCOLOGY | Facility: CLINIC | Age: 84
End: 2024-12-30
Payer: MEDICARE

## 2025-01-03 ENCOUNTER — TELEMEDICINE (OUTPATIENT)
Dept: SURGICAL ONCOLOGY | Facility: CLINIC | Age: 85
End: 2025-01-03
Payer: MEDICARE

## 2025-01-03 DIAGNOSIS — D49.0 IPMN (INTRADUCTAL PAPILLARY MUCINOUS NEOPLASM): Primary | ICD-10-CM

## 2025-01-03 NOTE — PROGRESS NOTES
A telephone visit (audio connection only) between the patient (at the originating site) and the provider (at the distant site) was utilized to provide this telehealth service.    Verbal consent was requested and obtained from the patient immediately prior to the telehealth visit.     Subjective   Ms. Ruiz is an 84-year-old female who is referred by Dr. Bill Garland for a pancreatic cyst.     She had a MRI Kidney in July of 2023 for follow-up of a right kidney mass. There was an incidental finding of a 0.9 cm pancreatic body cyst. She has not had dedicated surveillance since that time and was referred here to discuss.     She denies a personal history of acute pancreatitis.     She denies chronic abdominal pain, early satiety, poor appetite, jaundice and unintentional weight loss.     She uses a rollator at home to assist with ambulation. Last fall was in August of 2024.     Medical and surgical history: HTN, HLD, T2DM, osteopenia, CKD, recurrent UTIs, right renal neoplasm, s/p cholecystectomy.   Family history: No family history of pancreatic cancer.  Social history: Non-smoker. No alcohol use. No illicits.       Objective     There were no vitals taken for this visit.     Physical Exam  A physical exam was not conducted as this was a phone or virtual visit. The patient is in no acute distress.     Assessment/Plan   Ms. Ruiz is an 84-year-old female who is referred by Dr. Bill Garland for a pancreatic cyst.     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 I believe this was present on MRI dating back to 2016 in retrospect, albeit small. I reviewed her CT Kidney from March 2024 as well and I see no high risk or worrisome features.     I discussed that branch duct IPMNs represent potentially pre-malignant lesions and are managed based on the presence or absence of high risk stigmata or concerning features including cyst size, cyst growth over time, enhancing mural nodule or main duct  dilatation. Management decisions are based on these features, as well as, personal medical history, family history, presence or absence of symptoms and patient preference.    I discussed surveillance vs no surveillance (given her age and the overall low risk of the cyst). I do not feel strongly about surveillance, however, she would prefer it. We discussed Medicare guidelines for pancreatic cysts, which typically will allow MRI/MRCP every other year for small/low-risk pancreatic cysts. After discussion, she elects to have this completed in early 2026. I will call her with a reminder when she is due.       Macrina Herrera PA-C    Virtual or Telephone Consent:  While technically available, the patient was unable or unwilling to consent to connect via audio/video telehealth technology; therefore, I performed this visit using a real-time audio only connection between Mariaelena Ruiz & Macrina Herrera PA-C.  Verbal consent was requested and obtained from Mariaelena Ruiz on this date, 04/25/25 for a telehealth visit and the patient's location was confirmed at the time of the visit.

## 2025-01-21 DIAGNOSIS — I10 PRIMARY HYPERTENSION: ICD-10-CM

## 2025-01-21 DIAGNOSIS — E78.00 ELEVATED LDL CHOLESTEROL LEVEL: ICD-10-CM

## 2025-01-21 DIAGNOSIS — M54.16 ACUTE LEFT LUMBAR RADICULOPATHY: ICD-10-CM

## 2025-01-22 RX ORDER — AMLODIPINE BESYLATE 5 MG/1
5 TABLET ORAL DAILY
Qty: 90 TABLET | Refills: 1 | Status: SHIPPED | OUTPATIENT
Start: 2025-01-22

## 2025-01-22 RX ORDER — ATORVASTATIN CALCIUM 20 MG/1
20 TABLET, FILM COATED ORAL DAILY
Qty: 90 TABLET | Refills: 1 | Status: SHIPPED | OUTPATIENT
Start: 2025-01-22

## 2025-01-22 RX ORDER — DULOXETIN HYDROCHLORIDE 60 MG/1
60 CAPSULE, DELAYED RELEASE ORAL DAILY
Qty: 90 CAPSULE | Refills: 1 | Status: SHIPPED | OUTPATIENT
Start: 2025-01-22

## 2025-03-05 DIAGNOSIS — R30.0 DYSURIA: Primary | ICD-10-CM

## 2025-03-07 DIAGNOSIS — I12.9 HYPERTENSION ASSOCIATED WITH STAGE 3 CHRONIC KIDNEY DISEASE DUE TO TYPE 2 DIABETES MELLITUS (MULTI): ICD-10-CM

## 2025-03-07 DIAGNOSIS — E11.22 HYPERTENSION ASSOCIATED WITH STAGE 3 CHRONIC KIDNEY DISEASE DUE TO TYPE 2 DIABETES MELLITUS (MULTI): ICD-10-CM

## 2025-03-07 DIAGNOSIS — N18.30 HYPERTENSION ASSOCIATED WITH STAGE 3 CHRONIC KIDNEY DISEASE DUE TO TYPE 2 DIABETES MELLITUS (MULTI): ICD-10-CM

## 2025-03-10 RX ORDER — LOSARTAN POTASSIUM 100 MG/1
100 TABLET ORAL DAILY
Qty: 90 TABLET | Refills: 1 | Status: SHIPPED | OUTPATIENT
Start: 2025-03-10

## 2025-03-11 RX ORDER — CIPROFLOXACIN 250 MG/1
250 TABLET, FILM COATED ORAL 2 TIMES DAILY
Qty: 10 TABLET | Refills: 0 | Status: SHIPPED | OUTPATIENT
Start: 2025-03-11 | End: 2025-03-16

## 2025-03-12 ENCOUNTER — APPOINTMENT (OUTPATIENT)
Facility: HOSPITAL | Age: 85
End: 2025-03-12
Payer: MEDICARE

## 2025-03-12 RX ORDER — AMOXICILLIN AND CLAVULANATE POTASSIUM 875; 125 MG/1; MG/1
875 TABLET, FILM COATED ORAL 2 TIMES DAILY
Qty: 10 TABLET | Refills: 0 | Status: SHIPPED | OUTPATIENT
Start: 2025-03-12 | End: 2025-03-17

## 2025-03-13 ENCOUNTER — TELEPHONE (OUTPATIENT)
Dept: PRIMARY CARE | Facility: CLINIC | Age: 85
End: 2025-03-13
Payer: MEDICARE

## 2025-03-13 LAB
AMORPH SED URNS QL MICRO: ABNORMAL /HPF
APPEARANCE UR: ABNORMAL
BACTERIA #/AREA URNS HPF: ABNORMAL /HPF
BACTERIA UR CULT: ABNORMAL
BACTERIA UR CULT: ABNORMAL
BILIRUB UR QL STRIP: NEGATIVE
COLOR UR: YELLOW
GLUCOSE UR QL STRIP: NEGATIVE
HGB UR QL STRIP: ABNORMAL
HYALINE CASTS #/AREA URNS LPF: ABNORMAL /LPF
KETONES UR QL STRIP: NEGATIVE
LEUKOCYTE ESTERASE UR QL STRIP: ABNORMAL
NITRITE UR QL STRIP: POSITIVE
PH UR STRIP: 8.5 [PH] (ref 5–8)
PROT UR QL STRIP: ABNORMAL
RBC #/AREA URNS HPF: ABNORMAL /HPF
SERVICE CMNT-IMP: ABNORMAL
SP GR UR STRIP: 1.02 (ref 1–1.03)
SQUAMOUS #/AREA URNS HPF: ABNORMAL /HPF
TRI-PHOS CRY #/AREA URNS HPF: ABNORMAL /HPF
WBC #/AREA URNS HPF: ABNORMAL /HPF

## 2025-03-13 NOTE — TELEPHONE ENCOUNTER
----- Message from Bill Garland sent at 3/12/2025  6:22 PM EDT -----  Please call pt, her UTI is resistant to cipro which I sent to her pharmacy a day or so ago. I just sent in augmentin and I want her to take the augmentin and stop cipro

## 2025-03-21 ENCOUNTER — HOSPITAL ENCOUNTER (OUTPATIENT)
Dept: RADIOLOGY | Facility: HOSPITAL | Age: 85
End: 2025-03-21
Payer: MEDICARE

## 2025-04-07 ENCOUNTER — TELEPHONE (OUTPATIENT)
Dept: PRIMARY CARE | Facility: CLINIC | Age: 85
End: 2025-04-07
Payer: MEDICARE

## 2025-04-08 ENCOUNTER — APPOINTMENT (OUTPATIENT)
Dept: RADIOLOGY | Facility: HOSPITAL | Age: 85
End: 2025-04-08
Payer: MEDICARE

## 2025-05-19 DIAGNOSIS — R19.7 DIARRHEA, UNSPECIFIED TYPE: ICD-10-CM

## 2025-05-19 RX ORDER — COLESTIPOL HYDROCHLORIDE 1 G/1
TABLET ORAL
Qty: 60 TABLET | Refills: 1 | Status: SHIPPED | OUTPATIENT
Start: 2025-05-19

## 2025-05-21 DIAGNOSIS — Z79.4 TYPE 2 DIABETES MELLITUS WITH BOTH EYES AFFECTED BY MILD NONPROLIFERATIVE RETINOPATHY AND MACULAR EDEMA, WITH LONG-TERM CURRENT USE OF INSULIN: ICD-10-CM

## 2025-05-21 DIAGNOSIS — E11.3213 TYPE 2 DIABETES MELLITUS WITH BOTH EYES AFFECTED BY MILD NONPROLIFERATIVE RETINOPATHY AND MACULAR EDEMA, WITH LONG-TERM CURRENT USE OF INSULIN: ICD-10-CM

## 2025-05-21 RX ORDER — INSULIN LISPRO 100 [IU]/ML
INJECTION, SOLUTION INTRAVENOUS; SUBCUTANEOUS
Qty: 30 ML | Refills: 2 | Status: SHIPPED | OUTPATIENT
Start: 2025-05-21

## 2025-06-25 ENCOUNTER — APPOINTMENT (OUTPATIENT)
Dept: PRIMARY CARE | Facility: CLINIC | Age: 85
End: 2025-06-25
Payer: MEDICARE

## 2025-06-25 VITALS
SYSTOLIC BLOOD PRESSURE: 115 MMHG | HEIGHT: 65 IN | WEIGHT: 220 LBS | DIASTOLIC BLOOD PRESSURE: 69 MMHG | OXYGEN SATURATION: 94 % | BODY MASS INDEX: 36.65 KG/M2 | HEART RATE: 88 BPM

## 2025-06-25 DIAGNOSIS — Z79.4 TYPE 2 DIABETES MELLITUS WITH BOTH EYES AFFECTED BY MILD NONPROLIFERATIVE RETINOPATHY AND MACULAR EDEMA, WITH LONG-TERM CURRENT USE OF INSULIN: ICD-10-CM

## 2025-06-25 DIAGNOSIS — N18.31 STAGE 3A CHRONIC KIDNEY DISEASE (MULTI): ICD-10-CM

## 2025-06-25 DIAGNOSIS — E78.00 ELEVATED LDL CHOLESTEROL LEVEL: ICD-10-CM

## 2025-06-25 DIAGNOSIS — E11.3213 TYPE 2 DIABETES MELLITUS WITH MILD NONPROLIFERATIVE RETINOPATHY OF BOTH EYES AND MACULAR EDEMA, UNSPECIFIED WHETHER LONG TERM INSULIN USE: ICD-10-CM

## 2025-06-25 DIAGNOSIS — Z00.00 ROUTINE GENERAL MEDICAL EXAMINATION AT HEALTH CARE FACILITY: Primary | ICD-10-CM

## 2025-06-25 DIAGNOSIS — E11.3213 TYPE 2 DIABETES MELLITUS WITH BOTH EYES AFFECTED BY MILD NONPROLIFERATIVE RETINOPATHY AND MACULAR EDEMA, WITH LONG-TERM CURRENT USE OF INSULIN: ICD-10-CM

## 2025-06-25 PROBLEM — E66.01 MORBID (SEVERE) OBESITY DUE TO EXCESS CALORIES (MULTI): Status: RESOLVED | Noted: 2024-02-15 | Resolved: 2025-06-25

## 2025-06-25 LAB — POC HEMOGLOBIN A1C: 7.5 % (ref 4.2–6.5)

## 2025-06-25 PROCEDURE — 3078F DIAST BP <80 MM HG: CPT | Performed by: INTERNAL MEDICINE

## 2025-06-25 PROCEDURE — 83036 HEMOGLOBIN GLYCOSYLATED A1C: CPT | Performed by: INTERNAL MEDICINE

## 2025-06-25 PROCEDURE — 99214 OFFICE O/P EST MOD 30 MIN: CPT | Performed by: INTERNAL MEDICINE

## 2025-06-25 PROCEDURE — 1036F TOBACCO NON-USER: CPT | Performed by: INTERNAL MEDICINE

## 2025-06-25 PROCEDURE — 1170F FXNL STATUS ASSESSED: CPT | Performed by: INTERNAL MEDICINE

## 2025-06-25 PROCEDURE — 1159F MED LIST DOCD IN RCRD: CPT | Performed by: INTERNAL MEDICINE

## 2025-06-25 PROCEDURE — G0439 PPPS, SUBSEQ VISIT: HCPCS | Performed by: INTERNAL MEDICINE

## 2025-06-25 PROCEDURE — 3074F SYST BP LT 130 MM HG: CPT | Performed by: INTERNAL MEDICINE

## 2025-06-25 ASSESSMENT — ACTIVITIES OF DAILY LIVING (ADL)
GROCERY_SHOPPING: NEEDS ASSISTANCE
DOING_HOUSEWORK: NEEDS ASSISTANCE
TAKING_MEDICATION: INDEPENDENT
DRESSING: INDEPENDENT
BATHING: INDEPENDENT
MANAGING_FINANCES: INDEPENDENT

## 2025-06-25 ASSESSMENT — PATIENT HEALTH QUESTIONNAIRE - PHQ9
2. FEELING DOWN, DEPRESSED OR HOPELESS: NOT AT ALL
SUM OF ALL RESPONSES TO PHQ9 QUESTIONS 1 AND 2: 0
1. LITTLE INTEREST OR PLEASURE IN DOING THINGS: NOT AT ALL

## 2025-06-25 ASSESSMENT — ENCOUNTER SYMPTOMS
OCCASIONAL FEELINGS OF UNSTEADINESS: 1
DEPRESSION: 0
LOSS OF SENSATION IN FEET: 0

## 2025-06-25 NOTE — PROGRESS NOTES
Subjective   Patient ID: Mariaelena Ruiz is a 84 y.o. female who presents for No chief complaint on file..    HPI     Overall doing well  No specific concerns   No CP, SOB, SOTELO or LE edema     Review of Systems   All other systems reviewed and are negative.      Objective   There were no vitals taken for this visit.    Physical Exam  Constitutional:       Appearance: Normal appearance.   HENT:      Head: Normocephalic and atraumatic.   Cardiovascular:      Rate and Rhythm: Normal rate and regular rhythm.      Heart sounds: Normal heart sounds. No murmur heard.     No gallop.   Pulmonary:      Effort: Pulmonary effort is normal. No respiratory distress.      Breath sounds: No wheezing or rales.   Skin:     General: Skin is warm and dry.      Findings: No rash.   Neurological:      Mental Status: She is alert and oriented to person, place, and time. Mental status is at baseline.   Psychiatric:         Mood and Affect: Mood normal.         Behavior: Behavior normal.         Assessment/Plan       #DM2  -POCT A1c 7.5%  -Eye exam 1/2025  -Continue Humalog 15 AC breakfast and 10 AC dinner . Cont Lantus 35 units in am and 20 units in PM      #HTN  -Cont amlodipine 5mg daily and losartan 100mg daily     #Osteopenia  -previous right hip rx. Recommended Prolia- patient declines      #HLD  -Cont atorvastatin 20mg daily, LDL at goal <70     #CKD3a  -Stable, cont ARB/statin , had several previous UTIs so will defer SGLT2i      #right adrenal mass  -Evaluated by Dr. Thomason     #R right mass  -Following with urology--has appt 9/25/25     #Lumbar radiculopathy   -Cont gabapentin and duloxetine      #Pancreas cyst  -Following with PORFIRIO Marin     Influenza: Recommended  COVID: x2  Prevnar 13: UTD   Pneumovax 23: UTD   Prevnar 20:Recommended   RSV: Recommended   Shingrix: Recommended  Mamm: 10/25/17- NI  DEXA: 3/11/20- osteopenia --does not want any further testing   Pap: NI   Colorectal ca: 9/27/16- 10 years      RTC 4 mo

## 2025-06-25 NOTE — PROGRESS NOTES
Subjective   Patient ID: Mariaelena Ruiz is a 84 y.o. female who presents for No chief complaint on file..    HPI     Review of Systems    Objective   There were no vitals taken for this visit.    Physical Exam    Assessment/Plan       #HTN  -Cont amlodipine 5mg daily and losartan 100mg daily     #Osteopenia  -previous right hip rx. Recommended Prolia- patient declines      #HLD  -Cont atorvastatin 20mg daily, LDL at goal <70     #CKD3a  -Stable, cont ARB/statin , had several previous UTIs so will defer SGLT2i  -Check RFP      #right adrenal mass  -Evaluated by Dr. Thomason     #R right mass  -Following with urlogy     #Lumbar radiculopathy   -Cont gabapentin and duloxetine      #Pancreas cyst  -Refer to Jamari Herrera for management      Influenza: Recommended  COVID: x2  Prevnar 13: UTD   Pneumovax 23: UTD   Prevnar 20:Recommended   RSV: Recommended   Shingrix: Recommended  Mamm: 10/25/17- NI  DEXA: 3/11/20- osteopenia   Pap: NI   Colorectal ca: 9/27/16- 10 years      RTC 6 mo

## 2025-07-30 DIAGNOSIS — E78.00 ELEVATED LDL CHOLESTEROL LEVEL: ICD-10-CM

## 2025-07-30 DIAGNOSIS — I10 PRIMARY HYPERTENSION: ICD-10-CM

## 2025-07-30 RX ORDER — AMLODIPINE BESYLATE 5 MG/1
5 TABLET ORAL DAILY
Qty: 90 TABLET | Refills: 1 | Status: SHIPPED | OUTPATIENT
Start: 2025-07-30

## 2025-07-30 RX ORDER — ATORVASTATIN CALCIUM 20 MG/1
20 TABLET, FILM COATED ORAL DAILY
Qty: 90 TABLET | Refills: 1 | Status: SHIPPED | OUTPATIENT
Start: 2025-07-30

## 2025-08-04 ENCOUNTER — TELEPHONE (OUTPATIENT)
Dept: PRIMARY CARE | Facility: CLINIC | Age: 85
End: 2025-08-04
Payer: MEDICARE

## 2025-08-04 DIAGNOSIS — I12.9 HYPERTENSION ASSOCIATED WITH STAGE 3 CHRONIC KIDNEY DISEASE DUE TO TYPE 2 DIABETES MELLITUS: ICD-10-CM

## 2025-08-04 DIAGNOSIS — M54.32 SCIATICA OF LEFT SIDE: ICD-10-CM

## 2025-08-04 DIAGNOSIS — E11.22 HYPERTENSION ASSOCIATED WITH STAGE 3 CHRONIC KIDNEY DISEASE DUE TO TYPE 2 DIABETES MELLITUS: ICD-10-CM

## 2025-08-04 DIAGNOSIS — N18.30 HYPERTENSION ASSOCIATED WITH STAGE 3 CHRONIC KIDNEY DISEASE DUE TO TYPE 2 DIABETES MELLITUS: ICD-10-CM

## 2025-08-04 RX ORDER — LOSARTAN POTASSIUM 100 MG/1
100 TABLET ORAL DAILY
Qty: 90 TABLET | Refills: 1 | Status: SHIPPED | OUTPATIENT
Start: 2025-08-04

## 2025-08-04 RX ORDER — GABAPENTIN 300 MG/1
600 CAPSULE ORAL EVERY MORNING
Qty: 180 CAPSULE | Refills: 1 | Status: SHIPPED | OUTPATIENT
Start: 2025-08-04

## 2025-08-04 NOTE — TELEPHONE ENCOUNTER
Rx Refill Request Telephone Encounter    Name:  Mariaelena Ruiz  :  [id number]  Medication Name:    gabapentin (Neurontin) 300 mg capsule   amLODIPine (Norvasc) 5 mg tablet   losartan (Cozaar) 100 mg tablet     Specific Pharmacy location:  [id number] - [place], [place] - [address] [place] [address]     Pt also requesting the one touch strips be sent to her online mail order pharmacy.    Date of last appointment:  2025    Date of next appointment:  12/15/2025    Best number to reach patient:  [phone]

## 2025-08-14 ENCOUNTER — TELEPHONE (OUTPATIENT)
Dept: PRIMARY CARE | Facility: CLINIC | Age: 85
End: 2025-08-14
Payer: MEDICARE

## 2025-08-14 DIAGNOSIS — E11.3213 TYPE 2 DIABETES MELLITUS WITH MILD NONPROLIFERATIVE RETINOPATHY OF BOTH EYES AND MACULAR EDEMA, UNSPECIFIED WHETHER LONG TERM INSULIN USE: ICD-10-CM

## 2025-08-14 RX ORDER — PEN NEEDLE, DIABETIC 30 GX3/16"
NEEDLE, DISPOSABLE MISCELLANEOUS
Qty: 300 EACH | Refills: 3 | Status: CANCELLED | OUTPATIENT
Start: 2025-08-14

## 2025-08-15 DIAGNOSIS — E11.3213 TYPE 2 DIABETES MELLITUS WITH MILD NONPROLIFERATIVE RETINOPATHY OF BOTH EYES AND MACULAR EDEMA, UNSPECIFIED WHETHER LONG TERM INSULIN USE: ICD-10-CM

## 2025-08-15 DIAGNOSIS — Z79.4 TYPE 2 DIABETES MELLITUS WITH BOTH EYES AFFECTED BY MILD NONPROLIFERATIVE RETINOPATHY AND MACULAR EDEMA, WITH LONG-TERM CURRENT USE OF INSULIN: ICD-10-CM

## 2025-08-15 DIAGNOSIS — E11.3213 TYPE 2 DIABETES MELLITUS WITH BOTH EYES AFFECTED BY MILD NONPROLIFERATIVE RETINOPATHY AND MACULAR EDEMA, WITH LONG-TERM CURRENT USE OF INSULIN: ICD-10-CM

## 2025-08-15 RX ORDER — PEN NEEDLE, DIABETIC 30 GX3/16"
NEEDLE, DISPOSABLE MISCELLANEOUS
Qty: 300 EACH | Refills: 3 | Status: SHIPPED | OUTPATIENT
Start: 2025-08-15

## 2025-08-20 ENCOUNTER — TELEPHONE (OUTPATIENT)
Dept: PRIMARY CARE | Facility: CLINIC | Age: 85
End: 2025-08-20
Payer: MEDICARE

## 2025-08-20 RX ORDER — BLOOD SUGAR DIAGNOSTIC
STRIP MISCELLANEOUS
Qty: 200 STRIP | Refills: 2 | Status: SHIPPED | OUTPATIENT
Start: 2025-08-20

## 2025-09-25 ENCOUNTER — APPOINTMENT (OUTPATIENT)
Dept: UROLOGY | Facility: CLINIC | Age: 85
End: 2025-09-25
Payer: MEDICARE

## 2025-10-23 ENCOUNTER — APPOINTMENT (OUTPATIENT)
Dept: PRIMARY CARE | Facility: CLINIC | Age: 85
End: 2025-10-23
Payer: MEDICARE

## 2025-12-15 ENCOUNTER — APPOINTMENT (OUTPATIENT)
Dept: PRIMARY CARE | Facility: CLINIC | Age: 85
End: 2025-12-15
Payer: MEDICARE